# Patient Record
Sex: FEMALE | Race: WHITE | NOT HISPANIC OR LATINO | Employment: PART TIME | URBAN - METROPOLITAN AREA
[De-identification: names, ages, dates, MRNs, and addresses within clinical notes are randomized per-mention and may not be internally consistent; named-entity substitution may affect disease eponyms.]

---

## 2017-11-06 ENCOUNTER — THERAPY VISIT (OUTPATIENT)
Dept: PHYSICAL THERAPY | Facility: CLINIC | Age: 27
End: 2017-11-06
Payer: COMMERCIAL

## 2017-11-06 DIAGNOSIS — M76.32 ILIOTIBIAL BAND SYNDROME, LEFT: ICD-10-CM

## 2017-11-06 DIAGNOSIS — M25.562 ACUTE PAIN OF LEFT KNEE: Primary | ICD-10-CM

## 2017-11-06 PROCEDURE — 97112 NEUROMUSCULAR REEDUCATION: CPT | Mod: GP | Performed by: PHYSICAL THERAPIST

## 2017-11-06 PROCEDURE — 97110 THERAPEUTIC EXERCISES: CPT | Mod: GP | Performed by: PHYSICAL THERAPIST

## 2017-11-06 PROCEDURE — 97161 PT EVAL LOW COMPLEX 20 MIN: CPT | Mod: GP | Performed by: PHYSICAL THERAPIST

## 2017-11-06 NOTE — PROGRESS NOTES
Subjective:    Patient is a 27 year old female presenting with rehab left knee hpi. The history is provided by the patient. No  was used.   Nova Hagan is a 27 year old female with a left knee condition.  Condition occurred with:  A fall/slip, a wrong landing and a twist.  Condition occurred: at home.  This is a new condition  Pt was stepping off a countertop when her knee twisted and she fell onto her L knee. Her patella dislocated and she sufferred a chondral lesion on her lateral femur. She had a microfracture and MPFL reconstruction on 6-24-16. Pt went to PT until Jan of 2017 and began a return to run program in the spring of 2017 and he recommended that she resume a program for strength and running mechanics. PMH: she was in cross country and track and would love to return to running a half marathon or a 10-mile race. She would prefer to run 3-5 miles 3-5x/wk. .    Patient reports pain:  Lateral and medial.  Radiates to:  Thigh (ITB and lateral hip).  Pain is described as stabbing and aching and is intermittent   Associated symptoms:  Loss of strength. Pain is the same all the time.  Symptoms are exacerbated by descending stairs, bending/squatting and running and relieved by ice and rest.  Since onset symptoms are unchanged (better since surgery but when she ran in the spring sx worsened and since then her sx have been unchanged).  Special tests:  MRI and x-ray.  Previous treatment includes physical therapy.  There was moderate improvement following previous treatment.  General health as reported by patient is excellent.    Medical allergies: no.  Other surgeries include:  None reported.  Current medications:  None as reported by the patient.  Current occupation is RN at [employer]  .  Patient is working in normal job without restrictions.  Primary job tasks include:  Prolonged standing and lifting.    Barriers include:  Stairs.    Red flags:  None as reported by the patient.                         Objective:      Gait:      Deviations:  Pelvis:  Incr pelvic rotationHip:  Trendelenberg LKnee:  Transverse plane rotation L and poor patellar tracking LAnkle:  Pronation incr L                                                      Knee Evaluation:  ROM:  AROM: normal  PROM: normal  Strength wnl knee: knee ext/flex were 5/5 but 3+/5 on L hip ABD and 4/5 ext and 4-/5 and 4+/5 on R.            Ligament Testing:  Not Assessed                Special Tests:   Left knee positive for the following special tests:  Nimo's and IT Band Friction    Palpation:  Palpation of knee: medial patellar pain with mildly excessive lateral tracking.      Edema:  Normal      Functional Testing:          Quad:    Single Leg Squat:  Left:         60  Moderate loss of control, hip substitution, femoral IR, excessive anterior knee excursion and decreased hip/trunk flexion  Right:      70  Mild loss of control, femoral IR, excessive anterior knee excursion and decreased hip/trunk flexion  Bilateral Leg Squat:  90  Femoral IR, decreased hip/trunk flexion and normal control              General     ROS    Assessment/Plan:      Patient is a 27 year old female with left side knee complaints.    Patient has the following significant findings with corresponding treatment plan.                Diagnosis 1:  L knee pain with patellofemoral pain following MPFL reconstruction and lateral femoral condylar microfracture    Pain -  hot/cold therapy, splint/taping/bracing/orthotics, self management, education and home program  Decreased strength - therapeutic exercise and therapeutic activities  Decreased proprioception - neuro re-education and therapeutic activities  Impaired muscle performance - neuro re-education  Decreased function - therapeutic activities    Therapy Evaluation Codes:   1) History comprised of:   Personal factors that impact the plan of care:      Time since onset of symptoms.    Comorbidity factors that impact the plan of care  are:      None.     Medications impacting care: None.  2) Examination of Body Systems comprised of:   Body structures and functions that impact the plan of care:      Knee.   Activity limitations that impact the plan of care are:      Jumping, Lifting, Running, Sports and Squatting/kneeling.  3) Clinical presentation characteristics are:   Stable/Uncomplicated.  4) Decision-Making    Low complexity using standardized patient assessment instrument and/or measureable assessment of functional outcome.  Cumulative Therapy Evaluation is: Low complexity.    Previous and current functional limitations:  (See Goal Flow Sheet for this information)    Short term and Long term goals: (See Goal Flow Sheet for this information)     Communication ability:  Patient appears to be able to clearly communicate and understand verbal and written communication and follow directions correctly.  Treatment Explanation - The following has been discussed with the patient:   RX ordered/plan of care  Anticipated outcomes  Possible risks and side effects  This patient would benefit from PT intervention to resume normal activities.   Rehab potential is good.    Frequency:  1 X week, once daily  Duration:  for 6 weeks then 2x/month for 2 months  Discharge Plan:  Achieve all LTG.  Independent in home treatment program.  Reach maximal therapeutic benefit.    Please refer to the daily flowsheet for treatment today, total treatment time and time spent performing 1:1 timed codes.

## 2017-11-06 NOTE — MR AVS SNAPSHOT
After Visit Summary   11/6/2017    Nova Hagan    MRN: [id number]           Patient Information     Date Of Birth          1990        Visit Information        Provider Department      11/6/2017 11:20 AM Leesa Gambino PT [facility]        Today's Diagnoses     Acute pain of left knee    -  1    Iliotibial band syndrome, left           Follow-ups after your visit        Your next 10 appointments already scheduled     Nov 10, 2017  2:00 PM CST   SUZANNE Extremity with Leesa Gambino PT   [facility] ([facility])    [address]  [facility] [address]   [phone]            Nov 17, 2017  8:50 AM CST   SUZANNE Extremity with Leesa Gambino PT   [facility] ([facility])    [address]  [facility] [address]   [phone]            Nov 29, 2017 12:40 PM CST   SUZANNE Extremity with Leesa Gambino PT   [facility] ([facility])    [address]  [facility] [address]   [phone]            Dec 06, 2017 10:00 AM CST   SUZANNE Extremity with Leesa Gambino PT   [facility] ([facility])    [address]  [facility] [address]   [phone]            Dec 13, 2017 12:00 PM CST   SUZANNE Extremity with Leesa Gambino PT   [facility] ([facility])    [address]  [facility] [address]   [phone]              Who to contact     If you have questions or need follow up information about today's clinic visit or your schedule please contact [facility] PHYSICAL THERAPY directly at [phone].  Normal or non-critical lab and imaging results will be communicated to you by  "MyChart, letter or phone within 4 business days after the clinic has received the results. If you do not hear from us within 7 days, please contact the clinic through [employer] or phone. If you have a critical or abnormal lab result, we will notify you by phone as soon as possible.  Submit refill requests through [employer] or call your pharmacy and they will forward the refill request to us. Please allow 3 business days for your refill to be completed.          Additional Information About Your Visit        [employer] Information     [employer] lets you send messages to your doctor, view your test results, renew your prescriptions, schedule appointments and more. To sign up, go to [email or web address] . Click on \"Log in\" on the left side of the screen, which will take you to the Welcome page. Then click on \"Sign up Now\" on the right side of the page.     You will be asked to enter the access code listed below, as well as some personal information. Please follow the directions to create your username and password.     Your access code is: [email or web address]  Expires: 2018  6:45 PM     Your access code will  in 90 days. If you need help or a new code, please call your [place] clinic or [phone].        Care EveryWhere ID     This is your Care EveryWhere ID. This could be used by other organizations to access your [place] medical records  [id number]         Blood Pressure from Last 3 Encounters:   No data found for BP    Weight from Last 3 Encounters:   No data found for Wt              We Performed the Following     HC PT EVAL, LOW COMPLEXITY     SUZANNE INITIAL EVAL REPORT     NEUROMUSCULAR RE-EDUCATION     THERAPEUTIC EXERCISES        Primary Care Provider    Physician No Ref-Primary       NO REF-PRIMARY PHYSICIAN        Equal Access to Services     SHELDON BENOIT : Hadii vasile Foster, sohail simons, qalaureen maki. So [facility] [phone].    ATENCIÓN: " Si habla armani, tiene a brennan disposición servicios gratuitos de asistencia lingüística. Alfredo knight [phone].    We comply with applicable federal civil rights laws and [place] laws. We do not discriminate on the basis of race, color, national origin, age, disability, sex, sexual orientation, or gender identity.            Thank you!     Thank you for choosing [place] [facility] PHYSICAL [facility]  for your care. Our goal is always to provide you with excellent care. Hearing back from our patients is one way we can continue to improve our services. Please take a few minutes to complete the written survey that you may receive in the mail after your visit with us. Thank you!             Your Updated Medication List - Protect others around you: Learn how to safely use, store and throw away your medicines at www.disposemymeds.org.      Notice  As of 11/6/2017 11:59 PM    You have not been prescribed any medications.

## 2017-11-07 PROBLEM — M25.562 ACUTE PAIN OF LEFT KNEE: Status: ACTIVE | Noted: 2017-11-07

## 2017-11-07 PROBLEM — M76.32 ILIOTIBIAL BAND SYNDROME, LEFT: Status: ACTIVE | Noted: 2017-11-07

## 2017-11-08 ASSESSMENT — ACTIVITIES OF DAILY LIVING (ADL)
LIMPING: I DO NOT HAVE THE SYMPTOM
GIVING WAY, BUCKLING OR SHIFTING OF KNEE: THE SYMPTOM AFFECTS MY ACTIVITY SLIGHTLY
HOW_WOULD_YOU_RATE_THE_CURRENT_FUNCTION_OF_YOUR_KNEE_DURING_YOUR_USUAL_DAILY_ACTIVITIES_ON_A_SCALE_FROM_0_TO_100_WITH_100_BEING_YOUR_LEVEL_OF_KNEE_FUNCTION_PRIOR_TO_YOUR_INJURY_AND_0_BEING_THE_INABILITY_TO_PERFORM_ANY_OF_YOUR_USUAL_DAILY_ACTIVITIES?: 99
WALK: ACTIVITY IS NOT DIFFICULT
KNEEL ON THE FRONT OF YOUR KNEE: ACTIVITY IS NOT DIFFICULT
RAW_SCORE: 61
STAND: ACTIVITY IS MINIMALLY DIFFICULT
SIT WITH YOUR KNEE BENT: ACTIVITY IS NOT DIFFICULT
KNEE_ACTIVITY_OF_DAILY_LIVING_SCORE: 87.14
STIFFNESS: THE SYMPTOM AFFECTS MY ACTIVITY SLIGHTLY
KNEE_ACTIVITY_OF_DAILY_LIVING_SUM: 61
RISE FROM A CHAIR: ACTIVITY IS NOT DIFFICULT
GO DOWN STAIRS: ACTIVITY IS NOT DIFFICULT
PAIN: THE SYMPTOM AFFECTS MY ACTIVITY SLIGHTLY
GO UP STAIRS: ACTIVITY IS NOT DIFFICULT
WEAKNESS: THE SYMPTOM AFFECTS MY ACTIVITY SLIGHTLY
SQUAT: ACTIVITY IS NOT DIFFICULT
SWELLING: I DO NOT HAVE THE SYMPTOM

## 2017-11-10 ENCOUNTER — THERAPY VISIT (OUTPATIENT)
Dept: PHYSICAL THERAPY | Facility: CLINIC | Age: 27
End: 2017-11-10
Payer: COMMERCIAL

## 2017-11-10 DIAGNOSIS — M25.562 ACUTE PAIN OF LEFT KNEE: ICD-10-CM

## 2017-11-10 DIAGNOSIS — M76.32 ILIOTIBIAL BAND SYNDROME, LEFT: ICD-10-CM

## 2017-11-10 PROCEDURE — 97140 MANUAL THERAPY 1/> REGIONS: CPT | Mod: GP | Performed by: PHYSICAL THERAPIST

## 2017-11-10 PROCEDURE — 97110 THERAPEUTIC EXERCISES: CPT | Mod: GP | Performed by: PHYSICAL THERAPIST

## 2017-11-17 ENCOUNTER — THERAPY VISIT (OUTPATIENT)
Dept: PHYSICAL THERAPY | Facility: CLINIC | Age: 27
End: 2017-11-17
Payer: COMMERCIAL

## 2017-11-17 DIAGNOSIS — M76.32 ILIOTIBIAL BAND SYNDROME, LEFT: ICD-10-CM

## 2017-11-17 DIAGNOSIS — M25.562 ACUTE PAIN OF LEFT KNEE: ICD-10-CM

## 2017-11-17 PROCEDURE — 97110 THERAPEUTIC EXERCISES: CPT | Mod: GP | Performed by: PHYSICAL THERAPIST

## 2017-11-17 PROCEDURE — 97140 MANUAL THERAPY 1/> REGIONS: CPT | Mod: GP | Performed by: PHYSICAL THERAPIST

## 2017-11-17 NOTE — PROGRESS NOTES
"Subjective:    HPI                    Objective:    System    Physical Exam    General     ROS    Assessment/Plan:      SUBJECTIVE  Subjective changes as noted by pt:  She tried running and did yoga with the tape on and \"it felt good\" to run 3 miles. She also felt that the soft tissue treatment was helpful       Current pain level: 0/10     Changes in function:  Yes (See Goal flowsheet attached for changes in current functional level)     Adverse reaction to treatment or activity:  None    OBJECTIVE  Changes in objective findings:  Yes, ITB tension distally and glute medius is hypertonic on L        ASSESSMENT  Nova continues to require intervention to meet STG and LTG's: PT  Patient's symptoms are resolving.  Patient is progressing as expected.  Response to therapy has shown an improvement in  pain level and flexibility  Progress made towards STG/LTG?  Yes (See Goal flowsheet attached for updates on achievement of STG and LTG)    PLAN  Current treatment program is being advanced to more complex exercises.    PTA/ATC plan:  N/A    Please refer to the daily flowsheet for treatment today, total treatment time and time spent performing 1:1 timed codes.              "

## 2017-11-17 NOTE — MR AVS SNAPSHOT
After Visit Summary   11/17/2017    Nova Hagan    MRN: [id number]           Patient Information     Date Of Birth          1990        Visit Information        Provider Department      11/17/2017 8:50 AM Leesa Gambino, PT [facility]        Today's Diagnoses     Acute pain of left knee        Iliotibial band syndrome, left           Follow-ups after your visit        Your next 10 appointments already scheduled     Nov 29, 2017 12:40 PM CST   SUZANNE Extremity with Leesa Gambino PT   [facility] ([facility])    [address]  [facility] [address]   [phone]            Dec 13, 2017 12:00 PM CST   SUZANNE Extremity with Leesa Gambino PT   [facility] ([facility])    [address]  [facility] [address] [phone]              Who to contact     If you have questions or need follow up information about today's clinic visit or your schedule please contact [facility] directly at [phone].  Normal or non-critical lab and imaging results will be communicated to you by MyChart, letter or phone within 4 business days after the clinic has received the results. If you do not hear from us within 7 days, please contact the clinic through [employer] or phone. If you have a critical or abnormal lab result, we will notify you by phone as soon as possible.  Submit refill requests through [employer] or call your pharmacy and they will forward the refill request to us. Please allow 3 business days for your refill to be completed.          Additional Information About Your Visit        MyChart Information     MYOS lets you send messages to your doctor, view your test results, renew your prescriptions, schedule appointments and more. To sign up, go to  "[email or web address] . Click on \"Log in\" on the left side of the screen, which will take you to the Welcome page. Then click on \"Sign up Now\" on the right side of the page.     You will be asked to enter the access code listed below, as well as some personal information. Please follow the directions to create your username and password.     Your access code is: [email or web address]  Expires: 2018  6:45 PM     Your access code will  in 90 days. If you need help or a new code, please call your [place] clinic or [phone].        Care EveryWhere ID     This is your Care EveryWhere ID. This could be used by other organizations to access your [place] medical records  [id number]         Blood Pressure from Last 3 Encounters:   No data found for BP    Weight from Last 3 Encounters:   No data found for Wt              We Performed the Following     MANUAL THER TECH,1+REGIONS,EA 15 MIN     THERAPEUTIC EXERCISES        Primary Care Provider    Physician No Ref-Primary       NO REF-PRIMARY PHYSICIAN        Equal Access to Services     [facility]: Hadii aad ku hadasho Soomaali, waaxda luqadaha, qaybta kaalmada adeirmayagrayson, laureen tuttle . So [facility] [phone].    ATENCIÓN: Si habla español, tiene a brennan disposición servicios gratuitos de asistencia lingüística. Llame al [phone].    We comply with applicable federal civil rights laws and [place] laws. We do not discriminate on the basis of race, color, national origin, age, disability, sex, sexual orientation, or gender identity.            Thank you!     Thank you for choosing [facility]  for your care. Our goal is always to provide you with excellent care. Hearing back from our patients is one way we can continue to improve our services. Please take a few minutes to complete the written survey that you may receive in the mail after your visit with us. Thank you!             Your " Updated Medication List - Protect others around you: Learn how to safely use, store and throw away your medicines at www.disposemymeds.org.      Notice  As of 11/17/2017 12:04 PM    You have not been prescribed any medications.

## 2019-06-25 ENCOUNTER — TRANSFERRED RECORDS (OUTPATIENT)
Dept: HEALTH INFORMATION MANAGEMENT | Facility: CLINIC | Age: 29
End: 2019-06-25

## 2020-06-03 ENCOUNTER — OFFICE VISIT (OUTPATIENT)
Dept: OBGYN | Facility: CLINIC | Age: 30
End: 2020-06-03
Payer: COMMERCIAL

## 2020-06-03 ENCOUNTER — TRANSFERRED RECORDS (OUTPATIENT)
Dept: HEALTH INFORMATION MANAGEMENT | Facility: CLINIC | Age: 30
End: 2020-06-03

## 2020-06-03 VITALS
HEART RATE: 61 BPM | DIASTOLIC BLOOD PRESSURE: 62 MMHG | SYSTOLIC BLOOD PRESSURE: 111 MMHG | WEIGHT: 136.38 LBS | TEMPERATURE: 97.4 F

## 2020-06-03 DIAGNOSIS — Z01.818 PREOPERATIVE EXAMINATION: Primary | ICD-10-CM

## 2020-06-03 DIAGNOSIS — Z78.9: ICD-10-CM

## 2020-06-03 PROCEDURE — 99203 OFFICE O/P NEW LOW 30 MIN: CPT | Performed by: OBSTETRICS & GYNECOLOGY

## 2020-06-03 RX ORDER — PRENATAL VIT/IRON FUM/FOLIC AC 27MG-0.8MG
1 TABLET ORAL DAILY
COMMUNITY
End: 2023-12-13

## 2020-06-03 NOTE — PATIENT INSTRUCTIONS
If you have any questions regarding your visit, Please contact your care team.     [facility]: [phone]  [facility] CLINIC HOURS TELEPHONE NUMBER       Bayron Vasquez M.D.      Damon Caputo-Medical Assistant    [place]-  [facility]ith-     Monday-[place]  8:00a.m-4:45 p.m  Tuesday-[place]  9:00a.m-4:00 p.m  Wednesday-[place] 8:00a.m-4:45 p.m.  Thursday-[place]  8:00a.m-4:45 p.m.  Friday-[place]  8:00a.m-4:45 p.m. [facility]  [address]  [place], [place] [address] [phone] ask [facility]  [phone] Fax  Imaging [phone]    [facility]  [address]  [place], [place] [address] [phone]    [facility]  [address] [place] [address] [phone] [facility]  [phone] Fax  Imaging [phone]     Urgent Care locations:    [facility] Monday-Friday  5 pm - 9 pm  Saturday and Sunday   9 am - 5 pm  Monday-Friday   11 am - 9 pm  Saturday and Sunday   9 am - 5 pm   [phone]   If you need a medication refill, please contact your pharmacy. Please allow 3 business days for your refill to be completed.  As always, Thank you for trusting us with your healthcare needs!

## 2020-06-04 PROBLEM — M76.32 ILIOTIBIAL BAND SYNDROME, LEFT: Status: RESOLVED | Noted: 2017-11-07 | Resolved: 2020-06-04

## 2020-06-04 PROBLEM — M25.562 ACUTE PAIN OF LEFT KNEE: Status: RESOLVED | Noted: 2017-11-07 | Resolved: 2020-06-04

## 2020-06-05 NOTE — PROGRESS NOTES
OB-GYN Problem-Oriented Visit or Consultation      Nova Matthews is a 29 year old year old P 0 who presents with a chief complaint of preop exam prior to egg retrieval for IVF.  Referred by Dr. Keshav Jimenez at [facility].  Patient's last menstrual period was 05/19/2020 (approximate).    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 He has low sperm count but enough for IVF. Menses q 28-30 days x 4-5 days but currently controlled with gonadotropin treatment.     Past medical, obstetrical, surgical, family and social history reviewed and as noted or updated in chart.     Allergies, meds and supplements are as noted or updated in chart.      ROS:   Systems reviewed include:  constitutional, cardiac, respiratory, genitourinary, musculoskeletal, integumentary, psychological, hematologic/lymphatic and endocrine.    These systems were negative for significant symptoms except for the following additional: none; see HPI.    The patient denies allergies, anesthesia problems, bleeding tendencies, corticosteroids, diabetes, thromboembolic phenonmenon, rheumatic fever, glaucoma, heart murmur, hepatitis, herbal supplements, recent illnesses, implanted devices, body jewelry, Latex sensitivity, transfusions, and tobacco use except for the following: none.    EXAM:  VS as noted. /62 (BP Location: Right arm, Patient Position: Sitting, Cuff Size: Adult Regular)   Pulse 61   Temp 97.4  F (36.3  C) (Oral)   Wt 61.9 kg (136 lb 6 oz)   LMP 05/19/2020 (Approximate)   Constitutionally normal.  Airway, Neck, Nodes, Heart, Lungs were  normal or negative except for, or in particular noting, the following  pertinent findings: class 1 airway.      Assessment:   Encounter Diagnoses   Name Primary?     Preoperative examination Yes     History of therapy for infertility        Plan and Recommendations:   Total encounter time (physician together with patient) = 30min.  Direct counseling, education and care coordination time (physician together with patient) = 20min. This included the following:   I reviewed the condition, causes, differential diagnosis, prognosis, evaluation and management considerations and options.  Questions answered and information given. See orders.     Satisfactory pre-anesthetic medical exam. Form completed and faxed. No other preop testing needed.    A/P:  Nova was seen today for pre-op exam.    Diagnoses and all orders for this visit:    Preoperative examination    History of therapy for infertility        Bayron Vasquez MD

## 2020-09-28 ENCOUNTER — OFFICE VISIT (OUTPATIENT)
Dept: OBGYN | Facility: CLINIC | Age: 30
End: 2020-09-28
Attending: OBSTETRICS & GYNECOLOGY
Payer: COMMERCIAL

## 2020-09-28 ENCOUNTER — PRE VISIT (OUTPATIENT)
Dept: MATERNAL FETAL MEDICINE | Facility: CLINIC | Age: 30
End: 2020-09-28

## 2020-09-28 ENCOUNTER — ANCILLARY PROCEDURE (OUTPATIENT)
Dept: ULTRASOUND IMAGING | Facility: CLINIC | Age: 30
End: 2020-09-28
Attending: OBSTETRICS & GYNECOLOGY
Payer: COMMERCIAL

## 2020-09-28 ENCOUNTER — TRANSCRIBE ORDERS (OUTPATIENT)
Dept: MATERNAL FETAL MEDICINE | Facility: CLINIC | Age: 30
End: 2020-09-28

## 2020-09-28 VITALS
BODY MASS INDEX: 21.58 KG/M2 | HEART RATE: 67 BPM | HEIGHT: 68 IN | SYSTOLIC BLOOD PRESSURE: 111 MMHG | DIASTOLIC BLOOD PRESSURE: 75 MMHG | WEIGHT: 142.4 LBS

## 2020-09-28 DIAGNOSIS — Z78.9 CONCEIVED BY IN VITRO FERTILIZATION: ICD-10-CM

## 2020-09-28 DIAGNOSIS — O09.90 HIGH-RISK PREGNANCY, UNSPECIFIED TRIMESTER: Primary | ICD-10-CM

## 2020-09-28 DIAGNOSIS — O26.90 PREGNANCY RELATED CONDITION, ANTEPARTUM: Primary | ICD-10-CM

## 2020-09-28 DIAGNOSIS — O43.199 MARGINAL INSERTION OF UMBILICAL CORD AFFECTING MANAGEMENT OF MOTHER: ICD-10-CM

## 2020-09-28 DIAGNOSIS — Z23 NEED FOR PROPHYLACTIC VACCINATION AND INOCULATION AGAINST INFLUENZA: ICD-10-CM

## 2020-09-28 DIAGNOSIS — Z34.91 ENCOUNTER FOR SUPERVISION OF NORMAL PREGNANCY IN FIRST TRIMESTER, UNSPECIFIED GRAVIDITY: ICD-10-CM

## 2020-09-28 LAB
ABO + RH BLD: NORMAL
ABO + RH BLD: NORMAL
BLD GP AB SCN SERPL QL: NORMAL
BLOOD BANK CMNT PATIENT-IMP: NORMAL
DEPRECATED CALCIDIOL+CALCIFEROL SERPL-MC: 77 UG/L (ref 20–75)
ERYTHROCYTE [DISTWIDTH] IN BLOOD BY AUTOMATED COUNT: 12.3 % (ref 10–15)
HBV SURFACE AB SERPL IA-ACNC: 47.06 M[IU]/ML
HBV SURFACE AG SERPL QL IA: NONREACTIVE
HCT VFR BLD AUTO: 39.6 % (ref 35–47)
HCV AB SERPL QL IA: NONREACTIVE
HGB BLD-MCNC: 13.2 G/DL (ref 11.7–15.7)
HIV 1+2 AB+HIV1 P24 AG SERPL QL IA: NONREACTIVE
MCH RBC QN AUTO: 30.6 PG (ref 26.5–33)
MCHC RBC AUTO-ENTMCNC: 33.3 G/DL (ref 31.5–36.5)
MCV RBC AUTO: 92 FL (ref 78–100)
PLATELET # BLD AUTO: 285 10E9/L (ref 150–450)
RBC # BLD AUTO: 4.32 10E12/L (ref 3.8–5.2)
RUBV IGG SERPL IA-ACNC: 14 IU/ML
SPECIMEN EXP DATE BLD: NORMAL
T PALLIDUM AB SER QL: NONREACTIVE
WBC # BLD AUTO: 10.2 10E9/L (ref 4–11)

## 2020-09-28 PROCEDURE — 86850 RBC ANTIBODY SCREEN: CPT | Performed by: ADVANCED PRACTICE MIDWIFE

## 2020-09-28 PROCEDURE — 87086 URINE CULTURE/COLONY COUNT: CPT | Performed by: ADVANCED PRACTICE MIDWIFE

## 2020-09-28 PROCEDURE — 90686 IIV4 VACC NO PRSV 0.5 ML IM: CPT | Mod: ZF

## 2020-09-28 PROCEDURE — G0008 ADMIN INFLUENZA VIRUS VAC: HCPCS | Mod: ZF

## 2020-09-28 PROCEDURE — 86780 TREPONEMA PALLIDUM: CPT | Performed by: ADVANCED PRACTICE MIDWIFE

## 2020-09-28 PROCEDURE — 76801 OB US < 14 WKS SINGLE FETUS: CPT

## 2020-09-28 PROCEDURE — 87389 HIV-1 AG W/HIV-1&-2 AB AG IA: CPT | Performed by: ADVANCED PRACTICE MIDWIFE

## 2020-09-28 PROCEDURE — G0463 HOSPITAL OUTPT CLINIC VISIT: HCPCS | Mod: ZF,25

## 2020-09-28 PROCEDURE — 86762 RUBELLA ANTIBODY: CPT | Performed by: ADVANCED PRACTICE MIDWIFE

## 2020-09-28 PROCEDURE — 85027 COMPLETE CBC AUTOMATED: CPT | Performed by: ADVANCED PRACTICE MIDWIFE

## 2020-09-28 PROCEDURE — 36415 COLL VENOUS BLD VENIPUNCTURE: CPT | Performed by: ADVANCED PRACTICE MIDWIFE

## 2020-09-28 PROCEDURE — 86803 HEPATITIS C AB TEST: CPT | Performed by: ADVANCED PRACTICE MIDWIFE

## 2020-09-28 PROCEDURE — 86900 BLOOD TYPING SEROLOGIC ABO: CPT | Performed by: ADVANCED PRACTICE MIDWIFE

## 2020-09-28 PROCEDURE — 86706 HEP B SURFACE ANTIBODY: CPT | Performed by: ADVANCED PRACTICE MIDWIFE

## 2020-09-28 PROCEDURE — 82306 VITAMIN D 25 HYDROXY: CPT | Performed by: ADVANCED PRACTICE MIDWIFE

## 2020-09-28 PROCEDURE — 25000128 H RX IP 250 OP 636: Mod: ZF

## 2020-09-28 PROCEDURE — 87340 HEPATITIS B SURFACE AG IA: CPT | Performed by: ADVANCED PRACTICE MIDWIFE

## 2020-09-28 PROCEDURE — 86901 BLOOD TYPING SEROLOGIC RH(D): CPT | Performed by: ADVANCED PRACTICE MIDWIFE

## 2020-09-28 RX ORDER — SENNOSIDES 8.6 MG
1 TABLET ORAL DAILY
Status: ON HOLD | COMMUNITY
End: 2021-03-31

## 2020-09-28 RX ORDER — ASPIRIN 81 MG/1
81 TABLET, CHEWABLE ORAL DAILY
Status: ON HOLD | COMMUNITY
End: 2021-03-31

## 2020-09-28 SDOH — ECONOMIC STABILITY: FOOD INSECURITY: WITHIN THE PAST 12 MONTHS, YOU WORRIED THAT YOUR FOOD WOULD RUN OUT BEFORE YOU GOT THE MONEY TO BUY MORE.: NEVER TRUE

## 2020-09-28 SDOH — SOCIAL STABILITY: SOCIAL INSECURITY
WITHIN THE LAST YEAR, HAVE YOU BEEN RAPED OR FORCED TO HAVE ANY KIND OF SEXUAL ACTIVITY BY YOUR PARTNER OR EX-PARTNER?: NO

## 2020-09-28 SDOH — SOCIAL STABILITY: SOCIAL INSECURITY
WITHIN THE LAST YEAR, HAVE YOU BEEN KICKED, HIT, SLAPPED, OR OTHERWISE PHYSICALLY HURT BY YOUR PARTNER OR EX-PARTNER?: NO

## 2020-09-28 SDOH — HEALTH STABILITY: PHYSICAL HEALTH: ON AVERAGE, HOW MANY DAYS PER WEEK DO YOU ENGAGE IN MODERATE TO STRENUOUS EXERCISE (LIKE A BRISK WALK)?: 3 DAYS

## 2020-09-28 SDOH — SOCIAL STABILITY: SOCIAL NETWORK: HOW OFTEN DO YOU ATTEND MEETINGS OF THE CLUBS OR ORGANIZATIONS YOU BELONG TO?: NEVER

## 2020-09-28 SDOH — SOCIAL STABILITY: SOCIAL INSECURITY: WITHIN THE LAST YEAR, HAVE YOU BEEN AFRAID OF YOUR PARTNER OR EX-PARTNER?: NO

## 2020-09-28 SDOH — SOCIAL STABILITY: SOCIAL NETWORK: HOW OFTEN DO YOU GET TOGETHER WITH FRIENDS OR RELATIVES?: TWICE A WEEK

## 2020-09-28 SDOH — SOCIAL STABILITY: SOCIAL NETWORK
IN A TYPICAL WEEK, HOW MANY TIMES DO YOU TALK ON THE PHONE WITH FAMILY, FRIENDS, OR NEIGHBORS?: MORE THAN THREE TIMES A WEEK

## 2020-09-28 SDOH — ECONOMIC STABILITY: TRANSPORTATION INSECURITY: IN THE PAST 12 MONTHS, HAS LACK OF TRANSPORTATION KEPT YOU FROM MEDICAL APPOINTMENTS OR FROM GETTING MEDICATIONS?: NO

## 2020-09-28 SDOH — ECONOMIC STABILITY: FOOD INSECURITY: HOW HARD IS IT FOR YOU TO PAY FOR THE VERY BASICS LIKE FOOD, HOUSING, MEDICAL CARE, AND HEATING?: NOT HARD AT ALL

## 2020-09-28 SDOH — HEALTH STABILITY: PHYSICAL HEALTH: ON AVERAGE, HOW MANY MINUTES DO YOU ENGAGE IN EXERCISE AT THIS LEVEL?: 30 MIN

## 2020-09-28 SDOH — SOCIAL STABILITY: SOCIAL INSECURITY: WITHIN THE LAST YEAR, HAVE YOU BEEN HUMILIATED OR EMOTIONALLY ABUSED IN OTHER WAYS BY YOUR PARTNER OR EX-PARTNER?: NO

## 2020-09-28 SDOH — SOCIAL STABILITY: SOCIAL NETWORK: HOW OFTEN DO YOU ATTEND CHURCH OR RELIGIOUS SERVICES?: MORE THAN 4 TIMES PER YEAR

## 2020-09-28 SDOH — ECONOMIC STABILITY: FOOD INSECURITY: WITHIN THE PAST 12 MONTHS, THE FOOD YOU BOUGHT JUST DIDN'T LAST AND YOU DIDN'T HAVE MONEY TO GET MORE.: NEVER TRUE

## 2020-09-28 SDOH — HEALTH STABILITY: MENTAL HEALTH
DO YOU FEEL STRESS - TENSE, RESTLESS, NERVOUS, OR ANXIOUS, OR UNABLE TO SLEEP AT NIGHT BECAUSE YOUR MIND IS TROUBLED ALL THE TIME - THESE DAYS?: ONLY A LITTLE

## 2020-09-28 SDOH — SOCIAL STABILITY: SOCIAL NETWORK
DO YOU BELONG TO ANY CLUBS OR ORGANIZATIONS SUCH AS CHURCH GROUPS, UNIONS, FRATERNAL OR ATHLETIC GROUPS, OR SCHOOL GROUPS?: NO

## 2020-09-28 SDOH — SOCIAL STABILITY: SOCIAL INSECURITY: ARE YOU MARRIED, WIDOWED, DIVORCED, SEPARATED, NEVER MARRIED, OR LIVING WITH A PARTNER?: MARRIED

## 2020-09-28 ASSESSMENT — PAIN SCALES - GENERAL: PAINLEVEL: NO PAIN (0)

## 2020-09-28 ASSESSMENT — ACTIVITIES OF DAILY LIVING (ADL): LACK_OF_TRANSPORTATION: NO

## 2020-09-28 ASSESSMENT — MIFFLIN-ST. JEOR: SCORE: 1409.42

## 2020-09-28 NOTE — LETTER
2020       RE: Nova Matthews  [address]  [facility] [address]     Dear Colleague,    Thank you for referring your patient, Nova Matthews, to the [facility] at [facility]. Please see a copy of my visit note below.    [facility] OB Intake note  Subjective   30 year old woman presents to clinic for initiation of OB care. No LMP recorded. Patient is pregnant.  at Unknown by Estimated Date of Delivery: 2021 based on US confirms. Reviewed dating ultrasound. FHT, 164 vpm.  Pregnancy is planned, and conceived  through IVF.      Symptoms since LMP include nausea, vomiting, bowel changes, breast tenderness and fatigue. Patient has tried these relief measures: diet modification, vitamin B-6, increased rest and increased fluids. Senna 2 tabs for constipation.     - Genetic/Infection questionnaire completed, risks include . Pt  does not have a recent known exposure to Parvo or CMV so IgG/IgM testing WILL NOT be ordered.   Recommended Flu Vaccine.  Flu Vaccine Given  Have you traveled during the pregnancy?No  Have your sexual partner(s) travelled during the pregnancy?No      - Current Medications    Current Outpatient Medications   Medication Sig Dispense Refill     aspirin (ASA) 81 MG chewable tablet Take 81 mg by mouth daily       Cholecalciferol (VITAMIN D-3) 125 MCG (5000 UT) TABS        FOLIC ACID PO Take 1 mg by mouth daily       Follitropin Beta (FOLLISTIM IJ) Inject 150 Units as directed       Prenatal Vit-Fe Fumarate-FA (PRENATAL MULTIVITAMIN W/IRON) 27-0.8 MG tablet Take 1 tablet by mouth daily       sennosides (SENOKOT) 8.6 MG tablet Take 1 tablet by mouth daily           - Co-morbids    Past Medical History:   Diagnosis Date     NO ACTIVE PROBLEMS      - Risk for GDM -  has No known risk factors for GDM WILL NOT have an early GCT and possible Hgb A1C    - High Risk for Pre E-  Has No known risk factors of High risk for Pre E so WILL NOT start low  dose aspirin (81mg) starting between 12 and 28 weeks to prevent early onset preeclampsia.    - Moderate risk - has moderate risk factors for Pre E including Nulliparity    Since she meets one of the moderate risk facrtors for Pre E -   so WILL NOT consider starting low dose aspirin (81mg) starting between 12 and 28 weeks to prevent early onset preeclampsia.*[facility] recommendation for IVF and nullip, will recommend daily LD ASA- needs discussion at NOB    - The patient  does not have a history of spontaneous  birth so  WILL NOT consider progesterone starting at 16-20 weeks and/or serial transvaginal cervical length ultrasounds from 16-24 weeks.     -The patient does not have a history of immunosuppresion or HIV so Toxoplasma IgG/IgM WILL NOT be ordered.         PERSONAL/SOCIAL HISTORY    lives with their spouse.  Employment: Part time. Her job involves moderate activity. Will start working night shifts.   History of anxiety or depression  No- if Yes, therapy/medication/hospitalization  Additional items: None    Objective  -VS: reviewed and within normal limits   -General appearance: no acute distress, patient is comfortable   NEUROLOGICAL/PSYCHIATRIC   - Orientated x3,   -Mood and affect: : normal     Assessment/Plan  Nova was seen today for prenatal care.    Diagnoses and all orders for this visit:    High-risk pregnancy, unspecified trimester  -     ABO/Rh type and screen  -     Hepatitis B surface antigen  -     CBC with platelets  -     HIV Antigen Antibody Combo  -     Rubella Antibody IgG Quantitative  -     Treponema Abs w Reflex to RPR and Titer  -     Urine Culture Aerobic Bacterial  -     Hepatitis C antibody  -     Vitamin D Deficiency  -     Hepatitis B Surface Antibody  -     [facility] REFERRAL-PREGNANCY    Conceived by in vitro fertilization    Need for prophylactic vaccination and inoculation against influenza  -     FLU VAC PRESRV FREE QUAD SPLIT VIR 3+YRS IM    Marginal insertion of  umbilical cord affecting management of mother        30 year old  Unknown weeks of pregnancy with МАРИНА of 2021 by LMP of No LMP recorded. Patient is pregnant.. Ultrasound confirms.   Outpatient Encounter Medications as of 2020   Medication Sig Dispense Refill     aspirin (ASA) 81 MG chewable tablet Take 81 mg by mouth daily       Cholecalciferol (VITAMIN D-3) 125 MCG (5000 UT) TABS        FOLIC ACID PO Take 1 mg by mouth daily       Follitropin Beta (FOLLISTIM IJ) Inject 150 Units as directed       Prenatal Vit-Fe Fumarate-FA (PRENATAL MULTIVITAMIN W/IRON) 27-0.8 MG tablet Take 1 tablet by mouth daily       sennosides (SENOKOT) 8.6 MG tablet Take 1 tablet by mouth daily       No facility-administered encounter medications on file as of 2020.       Orders Placed This Encounter   Procedures     FLU VAC PRESRV FREE QUAD SPLIT VIR 3+YRS IM     Hepatitis B surface antigen     CBC with platelets     HIV Antigen Antibody Combo     Rubella Antibody IgG Quantitative     Treponema Abs w Reflex to RPR and Titer     Hepatitis C antibody     Vitamin D Deficiency     Hepatitis B Surface Antibody     MAT FETAL MED CTR REFERRAL-PREGNANCY     ABO/Rh type and screen                 Orders Placed This Encounter   Procedures     FLU VAC PRESRV FREE QUAD SPLIT VIR 3+YRS IM     Hepatitis B surface antigen     CBC with platelets     HIV Antigen Antibody Combo     Rubella Antibody IgG Quantitative     Treponema Abs w Reflex to RPR and Titer     Hepatitis C antibody     Vitamin D Deficiency     Hepatitis B Surface Antibody     MAT FETAL MED CTR REFERRAL-PREGNANCY     ABO/Rh type and screen       - Nausea, ordered vitamin B6 and Unisom 25 mg.   - 1st trimester screening ordered.    - Oriented to Practice, types of care, and how to reach a provider.  Pt prefers CNM.  - Patient received 1st trimester new OB education packet complete with aide of [employer] Expect[employer] booklet including information on genetic screening test  options.  - Patient desires 1st trimester screening which was ordered.  - Educational handout on the prevention of infections diseases during pregnancy provided.  - Patient was encouraged to start prenatal vitamins as tolerated.    - Patient was sent to lab for routine OB labs including Hep B, CBC, HIV, Rubella, Treponema, Hep C, Vit D, ABO/Rh.    - Reviewed risk for diabetes in pregnancy, No risk for GDM.  - Reviewed recommendation for low dose aspirin daily to prevent pre eclampsia, pt agrees, follow up at NOB visit. Was recommended by fertility to start on aspirin.   - Pregnancy concerns to be addressed by provider at new OB exam include: Was pt able to [id number] trimester screening and improvement of nausea/vomiting.  -watch for US assessment on next US    Pt to RTO for NOB visit in 2 weeks and prn if questions or concerns    I, Ramin Ramírez, am serving as a scribe; to document services personally performed by  Deirdre Alas based on data collection and the provider's statements to me.     Ramin Ramírez, SACHINN, JOSHUA Student  I agree with the PFSH and ROS as completed by the student, except for changes made by me. The remainder of the encounter was performed by me and scribed by the student. The scribed note accurately reflects my personal services and decisions made by me.  Deirdre Alas DNP, SCOTT, JAI Morrison CNM          Again, thank you for allowing me to participate in the care of your patient.      Sincerely,    JAI Cole CNM

## 2020-09-28 NOTE — LETTER
Date:October 3, 2020      Provider requested that no letter be sent. Do not send.       [facility] Health Information

## 2020-09-28 NOTE — PROGRESS NOTES
[facility] OB Intake note  Subjective   30 year old woman presents to clinic for initiation of OB care. No LMP recorded. Patient is pregnant.  at Unknown by Estimated Date of Delivery: 2021 based on US confirms. Reviewed dating ultrasound. FHT, 164 vpm.  Pregnancy is planned, and conceived  through IVF.      Symptoms since LMP include nausea, vomiting, bowel changes, breast tenderness and fatigue. Patient has tried these relief measures: diet modification, vitamin B-6, increased rest and increased fluids. Senna 2 tabs for constipation.     - Genetic/Infection questionnaire completed, risks include . Pt  does not have a recent known exposure to Parvo or CMV so IgG/IgM testing WILL NOT be ordered.   Recommended Flu Vaccine.  Flu Vaccine Given  Have you traveled during the pregnancy?No  Have your sexual partner(s) travelled during the pregnancy?No      - Current Medications    Current Outpatient Medications   Medication Sig Dispense Refill     aspirin (ASA) 81 MG chewable tablet Take 81 mg by mouth daily       Cholecalciferol (VITAMIN D-3) 125 MCG (5000 UT) TABS        FOLIC ACID PO Take 1 mg by mouth daily       Follitropin Beta (FOLLISTIM IJ) Inject 150 Units as directed       Prenatal Vit-Fe Fumarate-FA (PRENATAL MULTIVITAMIN W/IRON) 27-0.8 MG tablet Take 1 tablet by mouth daily       sennosides (SENOKOT) 8.6 MG tablet Take 1 tablet by mouth daily           - Co-morbids    Past Medical History:   Diagnosis Date     NO ACTIVE PROBLEMS      - Risk for GDM -  has No known risk factors for GDM WILL NOT have an early GCT and possible Hgb A1C    - High Risk for Pre E-  Has No known risk factors of High risk for Pre E so WILL NOT start low dose aspirin (81mg) starting between 12 and 28 weeks to prevent early onset preeclampsia.    - Moderate risk - has moderate risk factors for Pre E including Nulliparity    Since she meets one of the moderate risk facrtors for Pre E -   so WILL NOT consider starting low dose  aspirin (81mg) starting between 12 and 28 weeks to prevent early onset preeclampsia.*M recommendation for IVF and nullip, will recommend daily LD ASA- needs discussion at NOB    - The patient  does not have a history of spontaneous  birth so  WILL NOT consider progesterone starting at 16-20 weeks and/or serial transvaginal cervical length ultrasounds from 16-24 weeks.     -The patient does not have a history of immunosuppresion or HIV so Toxoplasma IgG/IgM WILL NOT be ordered.         PERSONAL/SOCIAL HISTORY    lives with their spouse.  Employment: Part time. Her job involves moderate activity. Will start working night shifts.   History of anxiety or depression  No- if Yes, therapy/medication/hospitalization  Additional items: None    Objective  -VS: reviewed and within normal limits   -General appearance: no acute distress, patient is comfortable   NEUROLOGICAL/PSYCHIATRIC   - Orientated x3,   -Mood and affect: : normal     Assessment/Plan  Nova was seen today for prenatal care.    Diagnoses and all orders for this visit:    High-risk pregnancy, unspecified trimester  -     ABO/Rh type and screen  -     Hepatitis B surface antigen  -     CBC with platelets  -     HIV Antigen Antibody Combo  -     Rubella Antibody IgG Quantitative  -     Treponema Abs w Reflex to RPR and Titer  -     Urine Culture Aerobic Bacterial  -     Hepatitis C antibody  -     Vitamin D Deficiency  -     Hepatitis B Surface Antibody  -     [facility] CTR REFERRAL-PREGNANCY    Conceived by in vitro fertilization    Need for prophylactic vaccination and inoculation against influenza  -     FLU VAC PRESRV FREE QUAD SPLIT VIR 3+YRS IM    Marginal insertion of umbilical cord affecting management of mother        30 year old  Unknown weeks of pregnancy with МАРИНА of 2021 by LMP of No LMP recorded. Patient is pregnant.. Ultrasound confirms.   Outpatient Encounter Medications as of 2020   Medication Sig Dispense  Refill     aspirin (ASA) 81 MG chewable tablet Take 81 mg by mouth daily       Cholecalciferol (VITAMIN D-3) 125 MCG (5000 UT) TABS        FOLIC ACID PO Take 1 mg by mouth daily       Follitropin Beta (FOLLISTIM IJ) Inject 150 Units as directed       Prenatal Vit-Fe Fumarate-FA (PRENATAL MULTIVITAMIN W/IRON) 27-0.8 MG tablet Take 1 tablet by mouth daily       sennosides (SENOKOT) 8.6 MG tablet Take 1 tablet by mouth daily       No facility-administered encounter medications on file as of 9/28/2020.       Orders Placed This Encounter   Procedures     FLU VAC PRESRV FREE QUAD SPLIT VIR 3+YRS IM     Hepatitis B surface antigen     CBC with platelets     HIV Antigen Antibody Combo     Rubella Antibody IgG Quantitative     Treponema Abs w Reflex to RPR and Titer     Hepatitis C antibody     Vitamin D Deficiency     Hepatitis B Surface Antibody     MAT FETAL MED CTR REFERRAL-PREGNANCY     ABO/Rh type and screen                 Orders Placed This Encounter   Procedures     FLU VAC PRESRV FREE QUAD SPLIT VIR 3+YRS IM     Hepatitis B surface antigen     CBC with platelets     HIV Antigen Antibody Combo     Rubella Antibody IgG Quantitative     Treponema Abs w Reflex to RPR and Titer     Hepatitis C antibody     Vitamin D Deficiency     Hepatitis B Surface Antibody     MAT FETAL MED CTR REFERRAL-PREGNANCY     ABO/Rh type and screen       - Nausea, ordered vitamin B6 and Unisom 25 mg.   - 1st trimester screening ordered.    - Oriented to Practice, types of care, and how to reach a provider.  Pt prefers CNM.  - Patient received 1st trimester new OB education packet complete with aide of [employer] Expectant Family booklet including information on genetic screening test options.  - Patient desires 1st trimester screening which was ordered.  - Educational handout on the prevention of infections diseases during pregnancy provided.  - Patient was encouraged to start prenatal vitamins as tolerated.    - Patient was sent to lab for routine  OB labs including Hep B, CBC, HIV, Rubella, Treponema, Hep C, Vit D, ABO/Rh.    - Reviewed risk for diabetes in pregnancy, No risk for GDM.  - Reviewed recommendation for low dose aspirin daily to prevent pre eclampsia, pt agrees, follow up at NOB visit. Was recommended by fertility to start on aspirin.   - Pregnancy concerns to be addressed by provider at new OB exam include: Was pt able to [id number] trimester screening and improvement of nausea/vomiting.  -watch for US assessment on next US    Pt to RTO for NOB visit in 2 weeks and prn if questions or concerns    I, Ramin Ramírez, am serving as a scribe; to document services personally performed by  Deirdre Alas based on data collection and the provider's statements to me.     Ramin Ramírez, SACHINN, DNP Student  I agree with the PFSH and ROS as completed by the student, except for changes made by me. The remainder of the encounter was performed by me and scribed by the student. The scribed note accurately reflects my personal services and decisions made by me.  Deirdre Alas DNP, CNM, APRN      Deirdre Alas, JAI CHAVEZ

## 2020-09-29 LAB
BACTERIA SPEC CULT: NO GROWTH
Lab: NORMAL
SPECIMEN SOURCE: NORMAL

## 2020-10-05 ENCOUNTER — HOSPITAL ENCOUNTER (OUTPATIENT)
Dept: ULTRASOUND IMAGING | Facility: CLINIC | Age: 30
End: 2020-10-05
Attending: ADVANCED PRACTICE MIDWIFE
Payer: COMMERCIAL

## 2020-10-05 ENCOUNTER — OFFICE VISIT (OUTPATIENT)
Dept: MATERNAL FETAL MEDICINE | Facility: CLINIC | Age: 30
End: 2020-10-05
Attending: ADVANCED PRACTICE MIDWIFE
Payer: COMMERCIAL

## 2020-10-05 ENCOUNTER — HOSPITAL ENCOUNTER (OUTPATIENT)
Dept: LAB | Facility: CLINIC | Age: 30
End: 2020-10-05
Attending: ADVANCED PRACTICE MIDWIFE
Payer: COMMERCIAL

## 2020-10-05 DIAGNOSIS — O26.90 PREGNANCY RELATED CONDITION, ANTEPARTUM: ICD-10-CM

## 2020-10-05 DIAGNOSIS — Z36.9 ENCOUNTER FOR ANTENATAL SCREENING: Primary | ICD-10-CM

## 2020-10-05 DIAGNOSIS — O09.811 PREGNANCY RESULTING FROM IN VITRO FERTILIZATION IN FIRST TRIMESTER: ICD-10-CM

## 2020-10-05 DIAGNOSIS — O09.811 PREGNANCY RESULTING FROM IN VITRO FERTILIZATION IN FIRST TRIMESTER: Primary | ICD-10-CM

## 2020-10-05 DIAGNOSIS — Z36.9 ENCOUNTER FOR ANTENATAL SCREENING: ICD-10-CM

## 2020-10-05 PROCEDURE — 99207 PR NO CHARGE LOS: CPT | Performed by: OBSTETRICS & GYNECOLOGY

## 2020-10-05 PROCEDURE — 76813 OB US NUCHAL MEAS 1 GEST: CPT

## 2020-10-05 PROCEDURE — 84704 HCG FREE BETACHAIN TEST: CPT | Performed by: OBSTETRICS & GYNECOLOGY

## 2020-10-05 PROCEDURE — 36415 COLL VENOUS BLD VENIPUNCTURE: CPT | Performed by: OBSTETRICS & GYNECOLOGY

## 2020-10-05 PROCEDURE — 96040 HC GENETIC COUNSELING, EACH 30 MINUTES: CPT | Performed by: GENETIC COUNSELOR, MS

## 2020-10-05 PROCEDURE — 84163 PAPPA SERUM: CPT | Performed by: OBSTETRICS & GYNECOLOGY

## 2020-10-05 PROCEDURE — 82105 ALPHA-FETOPROTEIN SERUM: CPT | Performed by: OBSTETRICS & GYNECOLOGY

## 2020-10-05 PROCEDURE — 76813 OB US NUCHAL MEAS 1 GEST: CPT | Mod: 26 | Performed by: OBSTETRICS & GYNECOLOGY

## 2020-10-05 NOTE — PROGRESS NOTES
[facility]  Genetic Counseling Consult    Patient: Nova Matthews YOB: 1990   Date of Service: 10/05/20      Nova Matthews was seen at [facility] for genetic consultation to discuss the options for routine screening for fetal chromosome abnormalities. The patient was accompanied by her partner, Pro to today's visit.       Impression/Plan:   1.  Nova had an ultrasound and blood draw for first trimester screening.  Results are expected within 7 days, and will be available in [employer].  We will contact her to discuss the results, and a copy will be forwarded to the office of the referring OB provider. Nova provided verbal permission for results to be left on her voicemail.     2. Maternal serum AFP (single marker screen) is recommended after 15 weeks to screen for open neural tube defects. A quad screen should not be performed.    3. Plan for level II comprehensive ultrasound and fetal echocardiogram given IVF pregnancy.     4. Release of information signed for expanded carrier screening result from [facility]. Will review with patient at time of FTS result.     Pregnancy History:   /Parity:    Age at Delivery: 30 year old  МАРИНА: 2021, by Ultrasound  Gestational Age: 13w5d    No significant complications or exposures were reported in the current pregnancy.    This pregnancy was achieved via IVF using Nova's 31yo egg and her partner, Pro's sperm. Two frozen 5 day embryos were transferred on 2020. PGT-A was reportedly not performed. We discussed that fetal echocardiograms are typically recommended for In Vitro Fertilization (IVF) pregnancies. The average pregnancy has a 0.5-1.0% chance of a congenital heart defect. However, studies suggest an increased chance for a heart defect for IVF pregnancies, with estimates ranging from 1-3.3%. Fetal echocardiograms are typically performed at 20 to 24 weeks gestation.      Lencho  paternal first cousin once removed was identified to have cystic fibrosis. Cystic fibrosis (CF) is a genetic condition caused by mutations in the CFTR gene that causes thick mucus in the lungs and digestive system. Nova reported undergoing cystic fibrosis carrier screening through her reproductive medicine clinic, which was reportedly negative. Result was not available for review today.  Release of information was signed today for expanded carrier screening result from [facility]. We will plan to review this result when I call with first trimester screening result.     Medical History:   Nova s reported medical history is not expected to impact pregnancy management or risks to fetal development.       Family History:   A three-generation pedigree was obtained, and is scanned under the  Media  tab.   The following significant findings were reported by Nova:  Nova's partner, Pro is 30. He was reported to have metastatic testicular cancer diagnosed at age 19, doing well now. Pro's maternal uncle was reported to have prostate cancer. Pro's maternal grandmother was reported to have breast cancer in her 70's. Pro's maternal grandfather was reported to have lung cancer, he was noted to be a smoker. Nova's maternal aunt was reported to have a history of leukemia. We discussed how most cancer seen in families occurs sporadically, but about 5-10% may be due to an underlying genetic etiology. The couple was encouraged to share this family history information with their primary care providers to ensure appropriate screening. They were also encouraged to share any relevant family history information with their pediatrician.     Otherwise, the reported family history is negative for multiple miscarriages, stillbirths, birth defects, intellectual disability, known genetic conditions, and consanguinity.       Carrier Screening:   The patient reports that she and the father of the pregnancy have  ancestry:     Cystic fibrosis is an  autosomal recessive genetic condition that occurs with increased frequency in individuals of  ancestry and carrier screening for this condition is available.  In addition,  screening in the [facility] includes cystic fibrosis.      Expanded carrier screening for mutations in a large panel of genes associated with autosomal recessive conditions including cystic fibrosis, spinal muscular atrophy, and others, is now available.      Nova reported undergoing expanded carrier screening through her reproductive medicine clinic ([facility]). 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 Result was not available for review today. Release of information was signed today for expanded carrier screening result from [facility]. We will plan to review this result when I call with first trimester screening result.     Risk Assessment for Chromosome Conditions:   We explained that the risk for fetal chromosome abnormalities increases with maternal age. We discussed specific features of common chromosome abnormalities, including Down syndrome, trisomy 13, trisomy 18, and sex chromosome trisomies.      - At age 30 at midtrimester, the risk to have a baby with Down syndrome is 1 in 597.    - At age 30 at midtrimester, the risk to have a baby with any chromosome abnormality is 1 in 299.     Testing Options:   We discussed the following options:   First trimester screening    First trimester ultrasound with nuchal translucency and nasal bone assessments, maternal plasma hCG, JENNA-A, and AFP measurement    Screens for fetal trisomy 21, trisomy 13, and trisomy 18    Cannot screen for open neural tube defects; maternal serum AFP after 15 weeks is recommended     Non-invasive Prenatal Testing (NIPT)    Maternal plasma cell-free DNA testing;  first trimester ultrasound with nuchal translucency and nasal bone assessment is recommended, when appropriate    Screens for fetal trisomy 21, trisomy 13, trisomy 18, and sex chromosome aneuploidy    Cannot screen for open neural tube defects; maternal serum AFP after 15 weeks is recommended     Chorionic villus sampling (CVS)    Invasive procedure typically performed in the first trimester by which placental villi are obtained for the purpose of chromosome analysis and/or other prenatal genetic analysis    Diagnostic results; >99% sensitivity for fetal chromosome abnormalities    Cannot test for open neural tube defects; maternal serum AFP after 15 weeks is recommended     Genetic Amniocentesis    Invasive procedure typically performed in the second trimester by which amniotic fluid is obtained for the purpose of chromosome analysis and/or other prenatal genetic analysis    Diagnostic results; >99% sensitivity for fetal chromosome abnormalities    AFAFP measurement tests for open neural tube defects     Comprehensive (Level II) ultrasound: Detailed ultrasound performed between 18-22 weeks gestation to screen for major birth defects and markers for aneuploidy.      We reviewed the benefits and limitations of this testing.  Screening tests provide a risk assessment specific to the pregnancy for certain fetal chromosome abnormalities, but cannot definitively diagnose or exclude a fetal chromosome abnormality.  Follow-up genetic counseling and consideration of diagnostic testing is recommended with any abnormal screening result.      It was a pleasure to be involved with [facility]. Face-to-face time of the meeting was 35 minutes.    Hawa Vora MS, [facility]  Maternal Fetal Medicine  [facility]  Ph: [phone]  [email or web address]

## 2020-10-05 NOTE — PROGRESS NOTES
Please see full imaging report from [employer] program under imaging tab.    Brandi Ventura MD  Maternal Fetal Medicine

## 2020-10-07 ENCOUNTER — TELEPHONE (OUTPATIENT)
Dept: MATERNAL FETAL MEDICINE | Facility: CLINIC | Age: 30
End: 2020-10-07

## 2020-10-07 DIAGNOSIS — O09.811 PREGNANCY RESULTING FROM IN VITRO FERTILIZATION IN FIRST TRIMESTER: Primary | ICD-10-CM

## 2020-10-07 DIAGNOSIS — O26.90 PREGNANCY RELATED CONDITION, ANTEPARTUM: ICD-10-CM

## 2020-10-07 DIAGNOSIS — O28.0 ABNORMAL MATERNAL SERUM SCREENING TEST: ICD-10-CM

## 2020-10-07 NOTE — TELEPHONE ENCOUNTER
October 7, 2020  Left a message for Nova regarding her first trimester screening results.    These results indicated a 1 in 505 risk for Down syndrome and a 1 in > 10,000 risk for trisomy 18/13. This result was reviewed today with Dr. Lin. The NT measurement was 1.9 mm, the nasal bone was present, the JENNA-A was 0.44 MoM and the free BhCG was 2.45 MoM. The risk for Down syndrome has increased from Nova's age related risk prior to screening, however is below the screening cutoff of 1/317. Noninvasive prenatal testing (NIPT) will remain available to Nova if she is interested in additional screening. Diagnostic testing will also remain available.     This screening test gives information about the risk of some chromosome conditions in a pregnancy, but does not definitively diagnose or exclude the presence of these chromosome conditions. There remains a small chance for a false positive or false negative result. This screen also does not address all chromosome abnormalities or all causes of birth defects and cognitive delay.    A copy of these results are available in her EPIC chart for her primary OB to review.    Maternal serum AFP only is recommended in the second trimester to screen for neural tube defects.    This information was left in Nova's voicemail per plan established at her genetic counseling appointment, and Nova was encouraged to reach out if she has any questions or concerns. Still waiting on carrier screening result to be sent over from [facility] to review with Nova.    Hawa Vora MS, [facility]  Genetic Counselor  Phone: [phone]  Pager: [phone]

## 2020-10-12 ENCOUNTER — OFFICE VISIT (OUTPATIENT)
Dept: OBGYN | Facility: CLINIC | Age: 30
End: 2020-10-12
Attending: ADVANCED PRACTICE MIDWIFE
Payer: COMMERCIAL

## 2020-10-12 VITALS
WEIGHT: 144.9 LBS | SYSTOLIC BLOOD PRESSURE: 116 MMHG | DIASTOLIC BLOOD PRESSURE: 75 MMHG | HEIGHT: 68 IN | HEART RATE: 76 BPM | BODY MASS INDEX: 21.96 KG/M2

## 2020-10-12 DIAGNOSIS — Z78.9 CONCEIVED BY IN VITRO FERTILIZATION: ICD-10-CM

## 2020-10-12 DIAGNOSIS — O43.199 MARGINAL INSERTION OF UMBILICAL CORD AFFECTING MANAGEMENT OF MOTHER: ICD-10-CM

## 2020-10-12 DIAGNOSIS — R87.610 ASCUS OF CERVIX WITH NEGATIVE HIGH RISK HPV: ICD-10-CM

## 2020-10-12 DIAGNOSIS — O09.90 HIGH-RISK PREGNANCY, UNSPECIFIED TRIMESTER: ICD-10-CM

## 2020-10-12 PROCEDURE — G0463 HOSPITAL OUTPT CLINIC VISIT: HCPCS

## 2020-10-12 PROCEDURE — 87591 N.GONORRHOEAE DNA AMP PROB: CPT | Performed by: ADVANCED PRACTICE MIDWIFE

## 2020-10-12 PROCEDURE — 99207 PR PRENATAL VISIT: CPT | Performed by: ADVANCED PRACTICE MIDWIFE

## 2020-10-12 PROCEDURE — 87491 CHLMYD TRACH DNA AMP PROBE: CPT | Performed by: ADVANCED PRACTICE MIDWIFE

## 2020-10-12 ASSESSMENT — ANXIETY QUESTIONNAIRES
7. FEELING AFRAID AS IF SOMETHING AWFUL MIGHT HAPPEN: NOT AT ALL
1. FEELING NERVOUS, ANXIOUS, OR ON EDGE: NOT AT ALL
GAD7 TOTAL SCORE: 0
3. WORRYING TOO MUCH ABOUT DIFFERENT THINGS: NOT AT ALL
6. BECOMING EASILY ANNOYED OR IRRITABLE: NOT AT ALL
5. BEING SO RESTLESS THAT IT IS HARD TO SIT STILL: NOT AT ALL
2. NOT BEING ABLE TO STOP OR CONTROL WORRYING: NOT AT ALL
IF YOU CHECKED OFF ANY PROBLEMS ON THIS QUESTIONNAIRE, HOW DIFFICULT HAVE THESE PROBLEMS MADE IT FOR YOU TO DO YOUR WORK, TAKE CARE OF THINGS AT HOME, OR GET ALONG WITH OTHER PEOPLE: NOT DIFFICULT AT ALL

## 2020-10-12 ASSESSMENT — PATIENT HEALTH QUESTIONNAIRE - PHQ9
SUM OF ALL RESPONSES TO PHQ QUESTIONS 1-9: 0
5. POOR APPETITE OR OVEREATING: NOT AT ALL

## 2020-10-12 ASSESSMENT — PAIN SCALES - GENERAL: PAINLEVEL: NO PAIN (0)

## 2020-10-12 ASSESSMENT — MIFFLIN-ST. JEOR: SCORE: 1420.63

## 2020-10-12 NOTE — LETTER
10/12/2020       RE: Nova Matthews  [address]  [facility] [address]     Dear Colleague,    Thank you for referring your patient, Nova Matthews, to the [facility] [place] [facility]. Please see a copy of my visit note below.    SUBJECTIVE:   Nova is a 30 year old female who presents to clinic for a new OB visit.   at 14w5d with Estimated Date of Delivery: 2021 based on US confirms. Feels well. Has  started PNV.     She has not had bleeding since her LMP.   She has had mild nausea. Weight loss has not occurred.   This was a planned pregnancy.   Partner is involved  OTHER CONCERNS:     - Continues to take daily low dose aspirin  - Nausea has improved. Continues to take Vit B6 and half tab of Unisom at bedtime.   - Pt is taking Vit D, and has decreased to taking it every other day since last Vit D lab was on the higher end of the normal range.  - Appetite has improved. Eating 3 meals with snacks in between. Drinking about 70-90 oz of water daily.   - Offered AFP: Pt accepted, and ordered    HX abn pap:   - 2017, ASCUS with negative high risk HPV. Follow up recommended in 3 years.  - Pt had colposcopy completed at [facility] on [place] and another pap on 2019. Pt reported results were normal. Would like to defer pap until postpartum.     ===========================================   ROS: 10 point ROS neg other than the symptoms noted above in the HPI.      PSYCHIATRIC:  Denies mood changes.      PHQ-9 score:    PHQ-9 SCORE 10/12/2020   PHQ-9 Total Score 0           ERIC-7 SCORE 10/12/2020   Total Score 0         Past History:  Her past medical history   Past Medical History:   Diagnosis Date     NO ACTIVE PROBLEMS    .   This is her first pregnancy  Since her last LMP she denies use of alcohol, tobacco and street drugs.  HISTORY:  Family History   Problem Relation Age of Onset     Hypertension Mother 50     Hyperthyroidism Mother 13      Alzheimer Disease Maternal Grandmother 84     Hypertension Maternal Grandfather 60     Alzheimer Disease Paternal Grandmother 80     Skin Cancer Other 78     Social History     Socioeconomic History     Marital status:      Spouse name: Pro     Number of children: 0     Years of education: Not on file     Highest education level: Not on file   Occupational History     Occupation: NICU RN     Comment: [employer]   Social Needs     Financial resource strain: Not hard at all     Food insecurity     Worry: Never true     Inability: Never true     Transportation needs     Medical: No     Non-medical: No   Tobacco Use     Smoking status: Never Smoker     Smokeless tobacco: Never Used   Substance and Sexual Activity     Alcohol use: Not Currently     Drug use: Never     Sexual activity: Yes     Partners: Male     Birth control/protection: None   Lifestyle     Physical activity     Days per week: 3 days     Minutes per session: 30 min     Stress: Only a little   Relationships     Social connections     Talks on phone: More than three times a week     Gets together: Twice a week     Attends Baptism service: More than 4 times per year     Active member of club or organization: No     Attends meetings of clubs or organizations: Never     Relationship status:      Intimate partner violence     Fear of current or ex partner: No     Emotionally abused: No     Physically abused: No     Forced sexual activity: No   Other Topics Concern     Not on file   Social History Narrative    How much exercise per week? Walking 4-5 x's     How much calcium per day? In foods and PNV       How much caffeine per day? none    How much vitamin D per day? supplement    Do you/your family wear seatbelts?  Yes    Do you/your family use safety helmets? Yes    Do you/your family use sunscreen? Yes    Do you/your family keep firearms in the home? Yes    Do you/your family have a smoke detector(s)? Yes        September 28, 2020 Murali Hsu  "LPN         Current Outpatient Medications   Medication Sig     aspirin (ASA) 81 MG chewable tablet Take 81 mg by mouth daily     Cholecalciferol (VITAMIN D-3) 125 MCG (5000 UT) TABS      FOLIC ACID PO Take 1 mg by mouth daily     Prenatal Vit-Fe Fumarate-FA (PRENATAL MULTIVITAMIN W/IRON) 27-0.8 MG tablet Take 1 tablet by mouth daily     sennosides (SENOKOT) 8.6 MG tablet Take 1 tablet by mouth daily     No current facility-administered medications for this visit.      Allergies   Allergen Reactions     Augmentin GI Disturbance       ============================================  MEDICAL HISTORY  Past Medical History:   Diagnosis Date     NO ACTIVE PROBLEMS      Past Surgical History:   Procedure Laterality Date     EXTRACTION(S) DENTAL      as a child, never as an adult     HC HYSTEROSCOPY W ENDOMETRIAL BX/POLYPECTOMY W/WO D&C       HERNIA REPAIR Bilateral 1994    bilateral inguinal      KNEE SURGERY  2016    left knee surgery       OB History    Para Term  AB Living   1 0 0 0 0 0   SAB TAB Ectopic Multiple Live Births   0 0 0 0 0      # Outcome Date GA Lbr Mejia/2nd Weight Sex Delivery Anes PTL Lv   1 Current              GYN History-   HX abn pap:   - 2017, ASCUS with negative high risk HPV. Follow up recommended in 3 years.  - Pt had colposcopy completed at [facility] on [place] and another pap on 2019. Pt reported results were normal. Would like to defer pap until postpartum.    I personally reviewed the past social/family/medical and surgical history on the date of service.   I reviewed lab work done at Intake visit with patient.    EXAM:  /75   Pulse 76   Ht 1.727 m (5' 7.99\")   Wt 65.7 kg (144 lb 14.4 oz)   BMI 22.04 kg/m     EXAM:  GENERAL:  Pleasant pregnant female, alert, cooperative  and well groomed.  SKIN:  Warm and dry, without lesions or rashes  HEAD: Symmetrical features.  MOUTH:  Pt was wearing face mask.   NECK:  Thyroid without enlargement and nodules.  " Lymph nodes not palpable.   LUNGS:  Clear to auscultation.  BREAST:    No dominant, fixed or suspicious masses are noted.  No skin or nipple changes or axillary nodes.   Nipples everted.      HEART:  RRR without murmur.  ABDOMEN: Soft without masses , tenderness or organomegaly.  No CVA tenderness.  Uterus not palpable at size equal to dates.  No scars noted.. Fetal heart tones present.  MUSCULOSKELETAL:  Full range of motion  EXTREMITIES:  No edema. No significant varicosities.   PELVIC EXAM:  GENITALIA: EGBUS  External genitalia, Bartholin's glands, urethra & [place]'s glands:normal. Vulva reveals no erythema or lesions.         VAGINA:  pink, normal rugae and discharge, no lesions, good tone.   CERVIX:  smooth, without discharge or CMT.               UTERUS: Anteverted,  nontender   ADNEXA:  Without masses or tenderness.   RECTAL:  Normal appearance.  Digital exam deferred.  WET PREP:Not done  GC/CHLAMYDIA CULTURE OBTAINED:YES    No results found for: PAP     Recommended Flu Vaccine.  Patient already received Flu Vaccine      ASSESSMENT:  30 year old , 14w5d weeks of pregnancy with МАРИНА of 2021 by US confirms  Intrauterine pregnancy 14w5d size is consistent with dates  Genetic Screening: First Trimester Screen done; agreeable to AFP      ICD-10-CM    1. High-risk pregnancy, unspecified trimester  O09.90 AFP - Alpha Fetoprotein Maternal Screen   2. Conceived by in vitro fertilization  Z78.9    3. Marginal insertion of umbilical cord affecting management of mother  O43.199    4. ASCUS of cervix with negative high risk HPV  R87.610        PLAN:  - Reviewed use of triage nurse line and contacting the on-call provider after hours for an urgent need such as fever, vagina bleeding, bladder or vaginal infection, rupture of membranes,  or term labor.    - Reviewed best evidence for: weight gain for her weight and height for pregnancy:  Based on pre-pregnancy Body mass index is 22.04 kg/m . RECOMMENDED  WEIGHT GAIN: 25-35 lbs.    - Reviewed healthy diet and foods to avoid, exercise and activity during pregnancy; avoiding exposure to toxoplasmosis; and maintenance of a generally healthy lifestyle.   - Discussed the harms, benefits, side effects and alternative therapies for current prescribed and OTC medications.    - Reviewed high risk for gestational diabetes d/t No known risk factors for GDM, IS NOT agreeable to early 1 hour today.  - Reviewed Moderate risk for Pre Eclampsia d/t No known risk factors of High risk for Pre E  or meets two or more of the moderate risk factors including Nulliparity  and IVF and IS agreeable to starting 81mg aspirin daily after 12 weeks .    - All pt's  questions discussed and answered.  Pt verbalized understanding of and agreement to plan of care.     - Continue scheduled prenatal care and prn if questions or concerns.  - Return to clinic in 4 weeks.    I, Ramin Ramírez, am serving as a scribe; to document services personally performed by  Deirdre Alas based on data collection and the provider's statements to me.     Ramin Ramírez, BSN, DNP Student                Again, thank you for allowing me to participate in the care of your patient.      Sincerely,    JAI Cole CNM

## 2020-10-12 NOTE — LETTER
Date:October 14, 2020      Patient was self referred, no letter generated. Do not send.        [facility] Information

## 2020-10-12 NOTE — TELEPHONE ENCOUNTER
October 12, 2020    Received call from Nova stating that she received voicemail with first trimester screen result, which we reviewed again today.    These results indicated a 1 in 505 risk for Down syndrome which is increased from Nova's age related risk prior to screening, however is below the screening cutoff of 1/317. Reviewed that a normal NT ultrasound and second trimester ultrasound cannot completely rule out the possibility of Down syndrome in a pregnancy. Noninvasive prenatal testing (NIPT) will remain available to Nova if she is interested in additional screening. She shared that at this time she feels comfortable, but will reach out if anything changes.     I left a voicemail for [facility] medical records and have not heard back regarding carrier screen result. Will reach out again today and will call Nova as soon as we receive a copy to review.     Hawa Vora MS, [facility]  Maternal Fetal Medicine  [facility]  Ph: [phone]  [email or web address]

## 2020-10-12 NOTE — PROGRESS NOTES
SUBJECTIVE:   Nova is a 30 year old female who presents to clinic for a new OB visit.   at 14w5d with Estimated Date of Delivery: 2021 based on US confirms. Feels well. Has  started PNV.     She has not had bleeding since her LMP.   She has had mild nausea. Weight loss has not occurred.   This was a planned pregnancy.   Partner is involved  OTHER CONCERNS:     - Continues to take daily low dose aspirin  - Nausea has improved. Continues to take Vit B6 and half tab of Unisom at bedtime.   - Pt is taking Vit D, and has decreased to taking it every other day since last Vit D lab was on the higher end of the normal range.  - Appetite has improved. Eating 3 meals with snacks in between. Drinking about 70-90 oz of water daily.   - Offered AFP: Pt accepted, and ordered    HX abn pap:   - 2017, ASCUS with negative high risk HPV. Follow up recommended in 3 years.  - Pt had [place] [facility] [place] and another pap on 2019. Pt reported results were normal. Would like to defer pap until postpartum.     ===========================================   ROS: 10 point ROS neg other than the symptoms noted above in the HPI.      PSYCHIATRIC:  Denies mood changes.      PHQ-9 score:    PHQ-9 SCORE 10/12/2020   PHQ-9 Total Score 0           ERIC-7 SCORE 10/12/2020   Total Score 0         Past History:  Her past medical history   Past Medical History:   Diagnosis Date     NO ACTIVE PROBLEMS    .   This is her first pregnancy  Since her last LMP she denies use of alcohol, tobacco and street drugs.  HISTORY:  Family History   Problem Relation Age of Onset     Hypertension Mother 50     Hyperthyroidism Mother 13     Alzheimer Disease Maternal Grandmother 84     Hypertension Maternal Grandfather 60     Alzheimer Disease Paternal Grandmother 80     Skin Cancer Other 78     Social History     Socioeconomic History     Marital status:      Spouse name: Pro     Number of children: 0     Years of  education: Not on file     Highest education level: Not on file   Occupational History     Occupation: NICU RN     Comment: [employer]   Social Needs     Financial resource strain: Not hard at all     Food insecurity     Worry: Never true     Inability: Never true     Transportation needs     Medical: No     Non-medical: No   Tobacco Use     Smoking status: Never Smoker     Smokeless tobacco: Never Used   Substance and Sexual Activity     Alcohol use: Not Currently     Drug use: Never     Sexual activity: Yes     Partners: Male     Birth control/protection: None   Lifestyle     Physical activity     Days per week: 3 days     Minutes per session: 30 min     Stress: Only a little   Relationships     Social connections     Talks on phone: More than three times a week     Gets together: Twice a week     Attends [employer] service: More than 4 times per year     Active member of club or organization: No     Attends meetings of clubs or organizations: Never     Relationship status:      Intimate partner violence     Fear of current or ex partner: No     Emotionally abused: No     Physically abused: No     Forced sexual activity: No   Other Topics Concern     Not on file   Social History Narrative    How much exercise per week? Walking 4-5 x's     How much calcium per day? In foods and PNV       How much caffeine per day? none    How much vitamin D per day? supplement    Do you/your family wear seatbelts?  Yes    Do you/your family use safety helmets? Yes    Do you/your family use sunscreen? Yes    Do you/your family keep firearms in the home? Yes    Do you/your family have a smoke detector(s)? Yes        September 28, 2020 Murali Hsu LPN         Current Outpatient Medications   Medication Sig     aspirin (ASA) 81 MG chewable tablet Take 81 mg by mouth daily     Cholecalciferol (VITAMIN D-3) 125 MCG (5000 UT) TABS      FOLIC ACID PO Take 1 mg by mouth daily     Prenatal Vit-Fe Fumarate-FA (PRENATAL MULTIVITAMIN W/IRON)  "27-0.8 MG tablet Take 1 tablet by mouth daily     sennosides (SENOKOT) 8.6 MG tablet Take 1 tablet by mouth daily     No current facility-administered medications for this visit.      Allergies   Allergen Reactions     Augmentin GI Disturbance       ============================================  MEDICAL HISTORY  Past Medical History:   Diagnosis Date     NO ACTIVE PROBLEMS      Past Surgical History:   Procedure Laterality Date     EXTRACTION(S) DENTAL      as a child, never as an adult     HC HYSTEROSCOPY W ENDOMETRIAL BX/POLYPECTOMY W/WO D&C       HERNIA REPAIR Bilateral 1994    bilateral inguinal      KNEE SURGERY  2016    left knee surgery       OB History    Para Term  AB Living   1 0 0 0 0 0   SAB TAB Ectopic Multiple Live Births   0 0 0 0 0      # Outcome Date GA Lbr Mejia/2nd Weight Sex Delivery Anes PTL Lv   1 Current              GYN History-   HX abn pap:   - 2017, ASCUS with negative high risk HPV. Follow up recommended in 3 years.  - Pt had colposcopy completed [place] [facility] on [place] and another pap on 2019. Pt reported results were normal. Would like to defer pap until postpartum.    I personally reviewed the past social/family/medical and surgical history on the date of service.   I reviewed lab work done at Intake visit with patient.    EXAM:  /75   Pulse 76   Ht 1.727 m (5' 7.99\")   Wt 65.7 kg (144 lb 14.4 oz)   BMI 22.04 kg/m     EXAM:  GENERAL:  Pleasant pregnant female, alert, cooperative  and well groomed.  SKIN:  Warm and dry, without lesions or rashes  HEAD: Symmetrical features.  MOUTH:  Pt was wearing face mask.   NECK:  Thyroid without enlargement and nodules.  Lymph nodes not palpable.   LUNGS:  Clear to auscultation.  BREAST:    No dominant, fixed or suspicious masses are noted.  No skin or nipple changes or axillary nodes.   Nipples everted.      HEART:  RRR without murmur.  ABDOMEN: Soft without masses , tenderness or organomegaly.  No CVA " tenderness.  Uterus not palpable at size equal to dates.  No scars noted.. Fetal heart tones present.  MUSCULOSKELETAL:  Full range of motion  EXTREMITIES:  No edema. No significant varicosities.   PELVIC EXAM:  GENITALIA: EGBUS  External genitalia, Bartholin's glands, urethra & [place]'s glands:normal. Vulva reveals no erythema or lesions.         VAGINA:  pink, normal rugae and discharge, no lesions, good tone.   CERVIX:  smooth, without discharge or CMT.               UTERUS: Anteverted,  nontender   ADNEXA:  Without masses or tenderness.   RECTAL:  Normal appearance.  Digital exam deferred.  WET PREP:Not done  GC/CHLAMYDIA CULTURE OBTAINED:YES    No results found for: PAP     Recommended Flu Vaccine.  Patient already received Flu Vaccine      ASSESSMENT:  30 year old , 14w5d weeks of pregnancy with МАРИНА of 2021 by US confirms  Intrauterine pregnancy 14w5d size is consistent with dates  Genetic Screening: First Trimester Screen done; agreeable to AFP      ICD-10-CM    1. High-risk pregnancy, unspecified trimester  O09.90 AFP - Alpha Fetoprotein Maternal Screen     Gonorrhea PCR     Chlamydia by PCR   2. Conceived by in vitro fertilization  Z78.9    3. Marginal insertion of umbilical cord affecting management of mother  O43.199    4. ASCUS of cervix with negative high risk HPV  R87.610        PLAN:  - Reviewed use of triage nurse line and contacting the on-call provider after hours for an urgent need such as fever, vagina bleeding, bladder or vaginal infection, rupture of membranes,  or term labor.    - Reviewed best evidence for: weight gain for her weight and height for pregnancy:  Based on pre-pregnancy Body mass index is 22.04 kg/m . RECOMMENDED WEIGHT GAIN: 25-35 lbs.    - Reviewed healthy diet and foods to avoid, exercise and activity during pregnancy; avoiding exposure to toxoplasmosis; and maintenance of a generally healthy lifestyle.   - Discussed the harms, benefits, side effects and  alternative therapies for current prescribed and OTC medications.    - Reviewed high risk for gestational diabetes d/t No known risk factors for GDM, IS NOT agreeable to early 1 hour today.  - Reviewed Moderate risk for Pre Eclampsia d/t No known risk factors of High risk for Pre E  or meets two or more of the moderate risk factors including Nulliparity  and IVF and IS agreeable to starting 81mg aspirin daily after 12 weeks .    - All pt's  questions discussed and answered.  Pt verbalized understanding of and agreement to plan of care.     - Continue scheduled prenatal care and prn if questions or concerns.  - Return to clinic in 4 weeks.    I, Ramin Ramírez, am serving as a scribe; to document services personally performed by  Deirdre Alas based on data collection and the provider's statements to me.     Ramin Ramírez, BSN, DNP Student    I agree with the PFSH and ROS as completed by the student, except for changes made by me. The remainder of the encounter was performed by me and scribed by the student. The scribed note accurately reflects my personal services and decisions made by me.  Deirdre Alas, DNP, CNM, APRN

## 2020-10-13 LAB
C TRACH DNA SPEC QL NAA+PROBE: NEGATIVE
N GONORRHOEA DNA SPEC QL NAA+PROBE: NEGATIVE
SPECIMEN SOURCE: NORMAL
SPECIMEN SOURCE: NORMAL

## 2020-10-13 ASSESSMENT — ANXIETY QUESTIONNAIRES: GAD7 TOTAL SCORE: 0

## 2020-10-15 LAB — LAB SCANNED RESULT: NORMAL

## 2020-10-16 NOTE — TELEPHONE ENCOUNTER
October 16, 2020    Left voicemail for Nova regarding Fragile X syndrome carrier screening result received from reproductive medicine clinic. We had planned to review this result together in more detail. Left direct contact information for call back.     Hawa Vora MS, [facility]  Maternal Fetal Medicine  [facility]  Ph: [phone]  [email or web address]

## 2020-10-22 NOTE — TELEPHONE ENCOUNTER
October 22, 2020    I spoke with Nova and she is interested in pursuing NIPT given first trimester screen increased the risk for Down syndrome from her age related risk prior to screening, however is below the screening cutoff. Informed consent for collection of genetic information was obtained over the phone and Nova plans to have drawn at [place] at time of AFP draw tomorrow. Nova provided verbal permission for results to be left on her voicemail. She requested the results do not include predicted fetal sex information. She was made aware that results may be released directly to her. She would like to defer reviewing Fragile X carrier screen result as she is currently at work. We reviewed that this can be discussed at time of NIPT result or when she returns for comprehensive ultrasound on 11/13.     Hawa Vora MS, [facility]  Maternal Fetal Medicine  [facility]  Ph: [phone]  [email or web address]

## 2020-10-22 NOTE — TELEPHONE ENCOUNTER
October 22, 2020    Received voicemail from Nova to discuss Fragile X carrier screening result. She is also interested in pursuing NIPT at the time of blood draw for AFP which has been ordered my primary OB.     October 22, 2020    I left a voicemail for Nova. AFP is ordered and can be drawn at any [facility] lab. NIPT would require informed consent which can be completed over the phone and patient can present to [facility] outpatient lab in [facility] and I can have the NIPT kit ready to go waiting at this laboratory when she is planning to go. Reviewed that Fragile X carrier screening result is in intermediate range in which case Nova's children are not at risk to be affected with Fragile X syndrome and we can review this result in further detail when she is available. Provided direct call back number and clinic hours for her to reach me.     Hawa Vora MS, [facility]  Maternal Fetal Medicine  [facility]  Ph: [phone]  [email or web address]

## 2020-10-23 DIAGNOSIS — O09.811 PREGNANCY RESULTING FROM IN VITRO FERTILIZATION IN FIRST TRIMESTER: ICD-10-CM

## 2020-10-23 DIAGNOSIS — O28.0 ABNORMAL MATERNAL SERUM SCREENING TEST: ICD-10-CM

## 2020-10-23 DIAGNOSIS — O09.90 HIGH-RISK PREGNANCY, UNSPECIFIED TRIMESTER: ICD-10-CM

## 2020-10-23 DIAGNOSIS — O26.90 PREGNANCY RELATED CONDITION, ANTEPARTUM: ICD-10-CM

## 2020-10-23 PROCEDURE — 999N001100 HC STATISTIC VERIFI PRENATAL TRISOMY 21,18,13: Performed by: OBSTETRICS & GYNECOLOGY

## 2020-10-23 PROCEDURE — 82105 ALPHA-FETOPROTEIN SERUM: CPT | Performed by: ADVANCED PRACTICE MIDWIFE

## 2020-10-23 PROCEDURE — 36415 COLL VENOUS BLD VENIPUNCTURE: CPT | Performed by: ADVANCED PRACTICE MIDWIFE

## 2020-10-25 LAB
# FETUSES US: NORMAL
# FETUSES: NORMAL
AFP ADJ MOM AMN: 2.17
AFP SERPL-MCNC: 76 NG/ML
AGE - REPORTED: 31.7 YR
CURRENT SMOKER: NO
CURRENT SMOKER: NO
DIABETES STATUS PATIENT: NO
FAMILY MEMBER DISEASES HX: NO
FAMILY MEMBER DISEASES HX: NO
GA METHOD: NORMAL
GA METHOD: NORMAL
GA: NORMAL WK
IDDM PATIENT QL: NO
INTEGRATED SCN PATIENT-IMP: NORMAL
LMP START DATE: NORMAL
MONOCHORIONIC TWINS: NO
SERVICE CMNT-IMP: NO
SPECIMEN DRAWN SERPL: NORMAL
VALPROIC/CARBAMAZEPINE STATUS: NO
WEIGHT UNITS: NORMAL

## 2020-10-28 ENCOUNTER — TELEPHONE (OUTPATIENT)
Dept: MATERNAL FETAL MEDICINE | Facility: CLINIC | Age: 30
End: 2020-10-28

## 2020-10-28 LAB — LAB SCANNED RESULT: NORMAL

## 2020-10-28 NOTE — TELEPHONE ENCOUNTER
October 28, 2020  Left a message for Nova regarding her NIPT results.     Results indicate NO ANEUPLOIDY DETECTED for chromosomes 21, 18, 13, or sex chromosomes. Fetal sex information was NOT disclosed per patient request.     This puts her current pregnancy at low risk for Down syndrome, trisomy 18, trisomy 13 and sex chromosome abnormalities. This test is reported to have the following sensitivities: Down syndrome: 99.2%, trisomy 18: >99.9%, and trisomy 13: >99.9%. Although these results are reassuring, this does not replace a standard chromosome analysis from a chorionic villus sampling or amniocentesis.     Her results are available in her Epic chart for her primary OB to review.    This information was left in Nova's voicemail per plan established at her genetic counseling appointment, and Nova was encouraged to reach out if she has any questions or concerns.      Hawa Vora MS, [facility]  Genetic Counselor  Phone: [phone]  Pager: [phone]

## 2020-10-28 NOTE — TELEPHONE ENCOUNTER
October 28, 2020    Fetal sex disclosed to Nova's friend, Clarisa by phone per patient request.     Hawa Vora MS, [facility]  Maternal Fetal Medicine  [facility]  Ph: [phone]  [email or web address]

## 2020-10-28 NOTE — TELEPHONE ENCOUNTER
October 28, 2020    Received call from Nova requesting fetal sex information be communicated to her friend, Clarisa at [phone]. She provided verbal permission for fetal sex to be disclosed to her or left in her voicemail.     Hawa Vora MS, [facility]  Maternal Fetal Medicine  [facility]  Ph: [phone]  [email or web address]

## 2020-11-09 ENCOUNTER — OFFICE VISIT (OUTPATIENT)
Dept: OBGYN | Facility: CLINIC | Age: 30
End: 2020-11-09
Attending: OBSTETRICS & GYNECOLOGY
Payer: COMMERCIAL

## 2020-11-09 VITALS
WEIGHT: 150.3 LBS | BODY MASS INDEX: 22.86 KG/M2 | SYSTOLIC BLOOD PRESSURE: 111 MMHG | HEART RATE: 67 BPM | DIASTOLIC BLOOD PRESSURE: 70 MMHG

## 2020-11-09 DIAGNOSIS — O09.90 HIGH-RISK PREGNANCY, UNSPECIFIED TRIMESTER: Primary | ICD-10-CM

## 2020-11-09 PROCEDURE — 99207 PR PRENATAL VISIT: CPT | Performed by: OBSTETRICS & GYNECOLOGY

## 2020-11-09 PROCEDURE — G0463 HOSPITAL OUTPT CLINIC VISIT: HCPCS

## 2020-11-09 ASSESSMENT — ANXIETY QUESTIONNAIRES
6. BECOMING EASILY ANNOYED OR IRRITABLE: NOT AT ALL
5. BEING SO RESTLESS THAT IT IS HARD TO SIT STILL: NOT AT ALL
GAD7 TOTAL SCORE: 0
3. WORRYING TOO MUCH ABOUT DIFFERENT THINGS: NOT AT ALL
2. NOT BEING ABLE TO STOP OR CONTROL WORRYING: NOT AT ALL
1. FEELING NERVOUS, ANXIOUS, OR ON EDGE: NOT AT ALL
7. FEELING AFRAID AS IF SOMETHING AWFUL MIGHT HAPPEN: NOT AT ALL

## 2020-11-09 ASSESSMENT — PATIENT HEALTH QUESTIONNAIRE - PHQ9
5. POOR APPETITE OR OVEREATING: NOT AT ALL
SUM OF ALL RESPONSES TO PHQ QUESTIONS 1-9: 1

## 2020-11-09 NOTE — PROGRESS NOTES
"NEDA Visit 20    S:  Nova is a healthy 30 year old female  at 18w5d gestation via IVF dating. She has been feeling well overall. She denies VB, LOF, SOB, CP, vomiting, contractions, RUQ pain, or pelvic pain. She does note that she has always had headaches on and off, even prior to pregnancy. She gets a headache about once a week or once every other week, and Tylenol usually helps. She does not have any vision changes with these headaches. She does take vitamin B6 and Unisom for nausea and she thinks this has been very helpful for her symptoms. She has tried not taking them a few times, but then she thinks she does not feel well the next day. She has small, random cramping in her abdomen that only last a few seconds. These cramps are sporadic.     Nova also has a history of constipation that has been worse with IVF treatments and pregnancy. She takes Senokot regularly and takes Miralax when she feels she needs it.    She had questions about a previous provider telling her she had \"marginal insertion of umbilical cord\" and wanted to learn more about this and if she should be worried.     O:  See OB Flowsheet    A/P: 31 yo  @ 18w5d presents for NEDA visit  1) PNC: Rh positive, Kimmie negative, Rubella immune, Infectious labs wnl  2) Genetic screening: Normal NT, First trimester screen increased the risk for Down syndrome from her age related risk prior to screening but below screening cutoff. Normal NIPT.  AFP negative.  Has Level II US scheduled 20.  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  Required IVF due to sperm count.  On ASA due to primigravida and IVF pregnancy.  Has fetal echo scheduled 20.  4) Nausea: Improved, occasional Vitamin B6 and Unisom  5) History abnormal pap: 2017, ASCUS with negative high risk HPV. Follow up recommended in 3 years.  Pt [facility] colpos[facility] completed [facility] on [facility] and another pap on 2019. " Pt reported results were normal. Would like to defer pap until postpartum.   6) Constipation: Senna and Miralax prn  7) s/p flu shot  8) At this time there is no reason for concern regarding the reading of marginal insertion of umbilical cord from dating US, this would be early for this finding.  Will reevaluate at her Level II US and if continued concern will complete serial growth scans.  9) RTC in 4 weeks for NEDA visit    Luli Zarate, MS3    Pt seen and discussed with Dr. Valentin.     Physician Attestation     I, Dorothea Valentin, was present with the medical student who participated in the service and in the documentation of the note. I have verified the history and personally performed the physical exam and medical decision making. I agree with the assessment and plan of care as documented in the note.       Dorothea Valentin MD

## 2020-11-09 NOTE — LETTER
"2020       RE: Nova Matthews  [address]  [place] [address]     Dear Colleague,    Thank you for referring your patient, Nova Matthews, to the [facility]OM[facility] [place] at [facility]. Please see a copy of my visit note below.    NEDA Visit 20    S:  Nova is a healthy 30 year old female  at 18w5d gestation via IVF dating. She has been feeling well overall. She denies VB, LOF, SOB, CP, vomiting, contractions, RUQ pain, or pelvic pain. She does note that she has always had headaches on and off, even prior to pregnancy. She gets a headache about once a week or once every other week, and Tylenol usually helps. She does not have any vision changes with these headaches. She does take vitamin B6 and Unisom for nausea and she thinks this has been very helpful for her symptoms. She has tried not taking them a few times, but then she thinks she does not feel well the next day. She has small, random cramping in her abdomen that only last a few seconds. These cramps are sporadic.     Nova also has a history of constipation that has been worse with IVF treatments and pregnancy. She takes Senokot regularly and takes Miralax when she feels she needs it.    She had questions about a previous provider telling her she had \"marginal insertion of umbilical cord\" and wanted to learn more about this and if she should be worried.     O:  See OB Flowsheet    A/P: 31 yo  @ 18w5d presents for NEDA visit  1) PNC: Rh positive, Kimmie negative, Rubella immune, Infectious labs wnl  2) Genetic screening: Normal NT, First trimester screen increased the risk for Down syndrome from her age related risk prior to screening but below screening cutoff. Normal NIPT.  AFP negative.  Has Level II US scheduled 20.  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  Required IVF due to sperm count.  On ASA due to " primigravida and IVF pregnancy.  Has fetal echo scheduled 12/4/20.  4) Nausea: Improved, occasional Vitamin B6 and Unisom  5) History abnormal pap: 4/26/2017, ASCUS with negative high risk HPV. Follow up recommended in 3 years.  Pt had [facility] 6[facility] and another pap on 7/8/2019. Pt reported results were normal. Would like to defer pap until postpartum.   6) Constipation: Senna and Miralax prn  7) s/p flu shot  8) At this time there is no reason for concern regarding the reading of marginal insertion of umbilical cord from dating US, this would be early for this finding.  Will reevaluate at her Level II US and if continued concern will complete serial growth scans.  9) RTC in 4 weeks for NEDA visit    Luli Zarate MS3    Pt seen and discussed with Dr. Valentin.     Physician Attestation     I, Dorothea Valentin, was present with the medical student who participated in the service and in the documentation of the note. I have verified the history and personally performed the physical exam and medical decision making. I agree with the assessment and plan of care as documented in the note.       Dorothea Valentin MD

## 2020-11-09 NOTE — NURSING NOTE
Chief Complaint   Patient presents with     Prenatal Care     18w5d       See QUANG Gonzalez 11/9/2020

## 2020-11-10 ASSESSMENT — ANXIETY QUESTIONNAIRES: GAD7 TOTAL SCORE: 0

## 2020-11-13 ENCOUNTER — OFFICE VISIT (OUTPATIENT)
Dept: MATERNAL FETAL MEDICINE | Facility: CLINIC | Age: 30
End: 2020-11-13
Attending: ADVANCED PRACTICE MIDWIFE
Payer: COMMERCIAL

## 2020-11-13 ENCOUNTER — HOSPITAL ENCOUNTER (OUTPATIENT)
Dept: ULTRASOUND IMAGING | Facility: CLINIC | Age: 30
End: 2020-11-13
Attending: ADVANCED PRACTICE MIDWIFE
Payer: COMMERCIAL

## 2020-11-13 DIAGNOSIS — O09.812 PREGNANCY RESULTING FROM IN VITRO FERTILIZATION IN SECOND TRIMESTER: Primary | ICD-10-CM

## 2020-11-13 DIAGNOSIS — O26.90 PREGNANCY RELATED CONDITION, ANTEPARTUM: ICD-10-CM

## 2020-11-13 PROCEDURE — 76811 OB US DETAILED SNGL FETUS: CPT | Mod: 26 | Performed by: OBSTETRICS & GYNECOLOGY

## 2020-11-13 PROCEDURE — 76811 OB US DETAILED SNGL FETUS: CPT

## 2020-11-13 NOTE — PROGRESS NOTES
"Please see \"Imaging\" tab under \"Chart Review\" for details of today's US at the [facility].    Luigi Garcia MD  Maternal-Fetal Medicine      "

## 2020-11-15 PROBLEM — O43.122 VELAMENTOUS INSERTION OF UMBILICAL CORD IN SECOND TRIMESTER: Status: ACTIVE | Noted: 2020-09-28

## 2020-12-04 ENCOUNTER — HOSPITAL ENCOUNTER (OUTPATIENT)
Dept: ULTRASOUND IMAGING | Facility: CLINIC | Age: 30
End: 2020-12-04
Attending: OBSTETRICS & GYNECOLOGY
Payer: COMMERCIAL

## 2020-12-04 ENCOUNTER — OFFICE VISIT (OUTPATIENT)
Dept: MATERNAL FETAL MEDICINE | Facility: CLINIC | Age: 30
End: 2020-12-04
Attending: OBSTETRICS & GYNECOLOGY
Payer: COMMERCIAL

## 2020-12-04 DIAGNOSIS — O09.812 PREGNANCY RESULTING FROM IN VITRO FERTILIZATION IN SECOND TRIMESTER: Primary | ICD-10-CM

## 2020-12-04 DIAGNOSIS — O09.811 PREGNANCY RESULTING FROM IN VITRO FERTILIZATION IN FIRST TRIMESTER: ICD-10-CM

## 2020-12-04 PROCEDURE — 76825 ECHO EXAM OF FETAL HEART: CPT

## 2020-12-04 PROCEDURE — 76827 ECHO EXAM OF FETAL HEART: CPT | Mod: 26 | Performed by: OBSTETRICS & GYNECOLOGY

## 2020-12-04 PROCEDURE — 93325 DOPPLER ECHO COLOR FLOW MAPG: CPT | Mod: 26 | Performed by: OBSTETRICS & GYNECOLOGY

## 2020-12-04 PROCEDURE — 76825 ECHO EXAM OF FETAL HEART: CPT | Mod: 26 | Performed by: OBSTETRICS & GYNECOLOGY

## 2020-12-04 NOTE — PROGRESS NOTES
"Please see \"Imaging\" tab under \"Chart Review\" for details of today's US at the [facility].    Luigi Garcia MD  Maternal-Fetal Medicine      "

## 2020-12-15 DIAGNOSIS — O43.122 VELAMENTOUS INSERTION OF UMBILICAL CORD IN SECOND TRIMESTER: Primary | ICD-10-CM

## 2020-12-15 NOTE — PROGRESS NOTES
NEDA Visit 20    S: Patient is feeling overall well. Denies recent headaches, vision changes, contractions, LOF, vaginal bleeding, or vaginal discharge. No RUQ or epigastric pain. Feels that her nausea has improved since her last visit and reports adequate control of constipation with Senna, Miralax PRN. Continues to feel fetal movements. Has questions about velamentous cord and what delivery might look like, risk of vasa previa. Otherwise doing well.    O:  See OB Flowsheet    FHR: 140 bpm  Fundal height: 24 cm    A/P: 29 yo  @ 24w0d presents for NEDA visit  1) PNC: Rh positive, Kimmie negative, Rubella immune, Infectious labs wnl  2) Genetic screening: Normal NT, First trimester screen increased the risk for Down syndrome from her age related risk prior to screening but below screening cutoff. Normal NIPT.  AFP negative.  Level II US normal, having a boy.  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  Required IVF due to sperm count. On ASA due to primigravida and IVF pregnancy. Fetal echo  normal.  4) Velamentous cord insertion: Plan to follow with growth scans every 4 weeks, EFW today 57%ile. Discussed with patient that biggest risk associated with velamentous cord is growth restriction, hence q4w growth scans. Ensured her there is low concern for vasa previa given placental location but that we will continue to monitor scans and alter plans for delivery as needed.   5) History abnormal pap: 2017, ASCUS with negative high risk HPV. Follow up recommended in 3 years.  Pt had colposcopy completed at [facility] on 2019 and another pap on 2019. Pt reported results were normal. Would like to defer pap until postpartum.   6) Nausea: Improved, occasional Vitamin B6 and Unisom  7) Constipation: Senna and Miralax prn  8) s/p flu shot  9) RTC in 4 weeks for Growth US and EOB visit    Kavitha Flynn MS3    Pt seen and discussed with Dr. Valentin.      Physician Attestation     I, Dorothea Valentin, was present with the medical student who participated in the service and in the documentation of the note. I have verified the history and personally performed the physical exam and medical decision making. I agree with the assessment and plan of care as documented in the note.       Dorothea Valentin MD

## 2020-12-16 ENCOUNTER — ANCILLARY PROCEDURE (OUTPATIENT)
Dept: ULTRASOUND IMAGING | Facility: CLINIC | Age: 30
End: 2020-12-16
Attending: OBSTETRICS & GYNECOLOGY
Payer: COMMERCIAL

## 2020-12-16 ENCOUNTER — OFFICE VISIT (OUTPATIENT)
Dept: OBGYN | Facility: CLINIC | Age: 30
End: 2020-12-16
Attending: OBSTETRICS & GYNECOLOGY
Payer: COMMERCIAL

## 2020-12-16 VITALS
SYSTOLIC BLOOD PRESSURE: 101 MMHG | HEIGHT: 68 IN | DIASTOLIC BLOOD PRESSURE: 67 MMHG | WEIGHT: 161 LBS | HEART RATE: 73 BPM | BODY MASS INDEX: 24.4 KG/M2

## 2020-12-16 DIAGNOSIS — O09.90 HIGH-RISK PREGNANCY, UNSPECIFIED TRIMESTER: Primary | ICD-10-CM

## 2020-12-16 DIAGNOSIS — O43.122 VELAMENTOUS INSERTION OF UMBILICAL CORD IN SECOND TRIMESTER: ICD-10-CM

## 2020-12-16 PROCEDURE — 76805 OB US >/= 14 WKS SNGL FETUS: CPT

## 2020-12-16 PROCEDURE — G0463 HOSPITAL OUTPT CLINIC VISIT: HCPCS | Mod: 25

## 2020-12-16 PROCEDURE — 99207 PR PRENATAL VISIT: CPT | Performed by: OBSTETRICS & GYNECOLOGY

## 2020-12-16 PROCEDURE — 76805 OB US >/= 14 WKS SNGL FETUS: CPT | Mod: 26 | Performed by: OBSTETRICS & GYNECOLOGY

## 2020-12-16 PROCEDURE — 76816 OB US FOLLOW-UP PER FETUS: CPT | Mod: 26 | Performed by: OBSTETRICS & GYNECOLOGY

## 2020-12-16 ASSESSMENT — MIFFLIN-ST. JEOR: SCORE: 1493.79

## 2020-12-16 NOTE — NURSING NOTE
Chief Complaint   Patient presents with     Prenatal Care     24w0d       See QUANG Gonzalez 12/16/2020

## 2020-12-16 NOTE — LETTER
2020       RE: Nova Matthews  [address]  [place] [address]     Dear Colleague,    Thank you for referring your patient, Nova Matthews, to the [facility] [place] at [facility]. Please see a copy of my visit note below.    NEDA Visit 20    S: Patient is feeling overall well. Denies recent headaches, vision changes, contractions, LOF, vaginal bleeding, or vaginal discharge. No RUQ or epigastric pain. Feels that her nausea has improved since her last visit and reports adequate control of constipation with Senna, Miralax PRN. Continues to feel fetal movements. Has questions about velamentous cord and what delivery might look like, risk of vasa previa. Otherwise doing well.    O:  See OB Flowsheet    FHR: 140 bpm  Fundal height: 24 cm    A/P: 31 yo  @ 24w0d presents for NEDA visit  1) PNC: Rh positive, Kimmie negative, Rubella immune, Infectious labs wnl  2) Genetic screening: Normal NT, First trimester screen increased the risk for Down syndrome from her age related risk prior to screening but below screening cutoff. Normal NIPT.  AFP negative.  Level II US normal, having a boy.  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  Required IVF due to sperm count. On ASA due to primigravida and IVF pregnancy. Fetal echo / normal.  4) Velamentous cord insertion: Plan to follow with growth scans every 4 weeks, EFW today 57%ile. Discussed with patient that biggest risk associated with velamentous cord is growth restriction, hence q4w growth scans. Ensured her there is low concern for vasa previa given placental location but that we will continue to monitor scans and alter plans for delivery as needed.   5) History abnormal pap: 2017, ASCUS with negative high risk HPV. Follow up recommended in 3 years.  Pt had colposcopy completed at [facility] on 2019 and another pap on 2019. Pt  reported results were normal. Would like to defer pap until postpartum.   6) Nausea: Improved, occasional Vitamin B6 and Unisom  7) Constipation: Senna and Miralax prn  8) s/p flu shot  9) RTC in 4 weeks for Growth US and EOB visit    Kavitha Flynn, MS3    Pt seen and discussed with Dr. Valentin.     Physician Attestation     I, Dorothea Valentin, was present with the medical student who participated in the service and in the documentation of the note. I have verified the history and personally performed the physical exam and medical decision making. I agree with the assessment and plan of care as documented in the note.       Dorothea Valentin MD

## 2020-12-27 ENCOUNTER — HEALTH MAINTENANCE LETTER (OUTPATIENT)
Age: 30
End: 2020-12-27

## 2021-01-12 NOTE — PROGRESS NOTES
"EOB Visit 2021    S: Nova is a 30 y.o.  at 28 weeks gestation via IVF dating who presents for EOB. No significant concerns today. Mood and energy level have been good. 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 States it was generalized to the front of her head, was not relieved with Tylenol but did go away every night, and overall was similar to past headaches (which she used to get once per month, possibly associated with menses, improved after she stopped OCP). Denies SOB, CP, palpitations, leg swelling, abdominal pain, nausea or vomiting. No pelvic pain, contractions, or vaginal bleeding. She did notice a small amount of \"wetness\" in her shorts after biking yesterday, but did not have pain and has not had LOF since. She has been feeling normal fetal movements.     Education completed today includes breast feeding, [facility] hand out, contraception, when to present to birthplace, GBS, getting enough iron and labor induction.  Birth preferences reviewed: Medicated (likely epidural)   Labor support:    [place] Feeding plans :   Contraception planned:  unsure  The following labs were ordered today:       GCT, CBC w platelets, Vitamin D and Anti-treponema, pre-eclampsia screening labs (platelets, AST/ALT, creatinine, urine protein/creatinine)   Water birth consent form was not given.  Blood type:   ABO   Date Value Ref Range Status   2020 O  Final     RH(D)   Date Value Ref Range Status   2020 Pos  Final     Antibody Screen   Date Value Ref Range Status   2020 Neg  Final   Rhogam was not given.  Tdap not given today as patient received Dose#1 of COVID vaccine today    O:  See OB Flowsheet    A/P: 31 yo  @ 28w0d presents for NEDA visit  1) PNC: Rh positive, Kimmie negative, Rubella immune, Infectious labs wnl, EOB labs ordered today  2) Genetic screening: " Normal NT, First trimester screen increased the risk for Down syndrome from her age related risk prior to screening but below screening cutoff. Normal NIPT.  AFP negative.  Level II US normal, having a boy.  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  Required IVF due to sperm count. On ASA due to primigravida and IVF pregnancy, repeat 3rd trimester levels today. Fetal echo 12/4 normal.  4) Velamentous cord insertion: Plan to follow with growth scans every 4 weeks, EFW today 63%ile.   5) History abnormal pap: 4/26/2017, ASCUS with negative high risk HPV. Follow up recommended in 3 years.  Pt had [facility] on 6[facility]25/2019 and another pap on 7/8/2019. Pt reported results were normal. Would like to defer pap until postpartum.   6) Delivery planning: epidural/breastfeed/unsure on contraception   7) Nausea: Improved, occasional Vitamin B6 and Unisom  8) Constipation: Senna and Miralax prn  9) s/p flu shot, received first dose of COVID vaccine today, will plan on Tdap at visit >2 weeks after second dose COVID vaccine   10) Refilled folic acid prescription per pt request   11) RTC in 2 weeks for NEDA visit, 4 weeks for Growth US and NEDA visit    The patient was seen and discussed with Dr. Homero Mendoza, MS3    Pt seen and discussed with Dr. Valentin.     Physician Attestation     I, Dorothea Valentin, was present with the medical student who participated in the service and in the documentation of the note. I have verified the history and personally performed the physical exam and medical decision making. I agree with the assessment and plan of care as documented in the note.       Dorothea Valentin MD

## 2021-01-13 ENCOUNTER — ANCILLARY PROCEDURE (OUTPATIENT)
Dept: ULTRASOUND IMAGING | Facility: CLINIC | Age: 31
End: 2021-01-13
Attending: OBSTETRICS & GYNECOLOGY
Payer: COMMERCIAL

## 2021-01-13 ENCOUNTER — OFFICE VISIT (OUTPATIENT)
Dept: OBGYN | Facility: CLINIC | Age: 31
End: 2021-01-13
Attending: OBSTETRICS & GYNECOLOGY
Payer: COMMERCIAL

## 2021-01-13 VITALS
BODY MASS INDEX: 24.48 KG/M2 | HEIGHT: 68 IN | HEART RATE: 83 BPM | SYSTOLIC BLOOD PRESSURE: 109 MMHG | WEIGHT: 161.5 LBS | DIASTOLIC BLOOD PRESSURE: 73 MMHG

## 2021-01-13 DIAGNOSIS — O09.93 SUPERVISION OF HIGH RISK PREGNANCY IN THIRD TRIMESTER: Primary | ICD-10-CM

## 2021-01-13 DIAGNOSIS — O43.122 VELAMENTOUS INSERTION OF UMBILICAL CORD IN SECOND TRIMESTER: ICD-10-CM

## 2021-01-13 LAB
ALT SERPL W P-5'-P-CCNC: 27 U/L (ref 0–50)
AST SERPL W P-5'-P-CCNC: 23 U/L (ref 0–45)
CREAT SERPL-MCNC: 0.47 MG/DL (ref 0.52–1.04)
CREAT UR-MCNC: 59 MG/DL
DEPRECATED CALCIDIOL+CALCIFEROL SERPL-MC: 53 UG/L (ref 20–75)
ERYTHROCYTE [DISTWIDTH] IN BLOOD BY AUTOMATED COUNT: 12.3 % (ref 10–15)
GFR SERPL CREATININE-BSD FRML MDRD: >90 ML/MIN/{1.73_M2}
GLUCOSE 1H P 50 G GLC PO SERPL-MCNC: 92 MG/DL (ref 60–129)
HCT VFR BLD AUTO: 32.9 % (ref 35–47)
HGB BLD-MCNC: 11.1 G/DL (ref 11.7–15.7)
MCH RBC QN AUTO: 29.8 PG (ref 26.5–33)
MCHC RBC AUTO-ENTMCNC: 33.7 G/DL (ref 31.5–36.5)
MCV RBC AUTO: 88 FL (ref 78–100)
PLATELET # BLD AUTO: 267 10E9/L (ref 150–450)
PROT UR-MCNC: 0.07 G/L
PROT/CREAT 24H UR: 0.12 G/G CR (ref 0–0.2)
RBC # BLD AUTO: 3.72 10E12/L (ref 3.8–5.2)
T PALLIDUM AB SER QL: NONREACTIVE
WBC # BLD AUTO: 11.2 10E9/L (ref 4–11)

## 2021-01-13 PROCEDURE — 82565 ASSAY OF CREATININE: CPT | Performed by: OBSTETRICS & GYNECOLOGY

## 2021-01-13 PROCEDURE — 76816 OB US FOLLOW-UP PER FETUS: CPT | Mod: 26 | Performed by: OBSTETRICS & GYNECOLOGY

## 2021-01-13 PROCEDURE — 76805 OB US >/= 14 WKS SNGL FETUS: CPT

## 2021-01-13 PROCEDURE — G0463 HOSPITAL OUTPT CLINIC VISIT: HCPCS | Mod: 25

## 2021-01-13 PROCEDURE — 84460 ALANINE AMINO (ALT) (SGPT): CPT | Performed by: OBSTETRICS & GYNECOLOGY

## 2021-01-13 PROCEDURE — 82950 GLUCOSE TEST: CPT | Performed by: OBSTETRICS & GYNECOLOGY

## 2021-01-13 PROCEDURE — 99207 PR PRENATAL VISIT: CPT | Performed by: OBSTETRICS & GYNECOLOGY

## 2021-01-13 PROCEDURE — 36415 COLL VENOUS BLD VENIPUNCTURE: CPT | Performed by: OBSTETRICS & GYNECOLOGY

## 2021-01-13 PROCEDURE — 84156 ASSAY OF PROTEIN URINE: CPT | Performed by: OBSTETRICS & GYNECOLOGY

## 2021-01-13 PROCEDURE — 86780 TREPONEMA PALLIDUM: CPT | Performed by: OBSTETRICS & GYNECOLOGY

## 2021-01-13 PROCEDURE — 84450 TRANSFERASE (AST) (SGOT): CPT | Performed by: OBSTETRICS & GYNECOLOGY

## 2021-01-13 PROCEDURE — 82306 VITAMIN D 25 HYDROXY: CPT | Performed by: OBSTETRICS & GYNECOLOGY

## 2021-01-13 PROCEDURE — 85027 COMPLETE CBC AUTOMATED: CPT | Performed by: OBSTETRICS & GYNECOLOGY

## 2021-01-13 RX ORDER — CYANOCOBALAMIN (VITAMIN B-12) 500 MCG
1 TABLET ORAL DAILY
Qty: 90 CAPSULE | Refills: 3 | Status: ON HOLD | OUTPATIENT
Start: 2021-01-13 | End: 2021-03-29

## 2021-01-13 ASSESSMENT — MIFFLIN-ST. JEOR: SCORE: 1496.06

## 2021-01-13 ASSESSMENT — ANXIETY QUESTIONNAIRES
2. NOT BEING ABLE TO STOP OR CONTROL WORRYING: NOT AT ALL
3. WORRYING TOO MUCH ABOUT DIFFERENT THINGS: NOT AT ALL
7. FEELING AFRAID AS IF SOMETHING AWFUL MIGHT HAPPEN: NOT AT ALL
GAD7 TOTAL SCORE: 0
1. FEELING NERVOUS, ANXIOUS, OR ON EDGE: NOT AT ALL
6. BECOMING EASILY ANNOYED OR IRRITABLE: NOT AT ALL
5. BEING SO RESTLESS THAT IT IS HARD TO SIT STILL: NOT AT ALL

## 2021-01-13 ASSESSMENT — PATIENT HEALTH QUESTIONNAIRE - PHQ9: 5. POOR APPETITE OR OVEREATING: NOT AT ALL

## 2021-01-13 NOTE — LETTER
"2021       RE: Nova Matthews  [address]  [place] [address]     Dear Colleague,    Thank you for referring your patient, Nova Matthews, to the [facility] [place] at [facility]. Please see a copy of my visit note below.    EOB Visit 2021    S: Nova is a 30 y.o.  at 28 weeks gestation via IVF dating who presents for EOB. No significant concerns today. Mood and energy level have been good. 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 States it was generalized to the front of her head, was not relieved with Tylenol but did go away every night, and overall was similar to past headaches (which she used to get once per month, possibly associated with menses, improved after she stopped OCP). Denies SOB, CP, palpitations, leg swelling, abdominal pain, nausea or vomiting. No pelvic pain, contractions, or vaginal bleeding. She did notice a small amount of \"wetness\" in her shorts after biking yesterday, but did not have pain and has not had LOF since. She has been feeling normal fetal movements.     Education completed today includes breast feeding, [facility] hand out, contraception, when to present to birthplace, GBS, getting enough iron and labor induction.  Birth preferences reviewed: Medicated (likely epidural)   Labor support:     Feeding plans :   Contraception planned:  unsure  The following labs were ordered today:       GCT, CBC w platelets, Vitamin D and Anti-treponema, pre-eclampsia screening labs (platelets, AST/ALT, creatinine, urine protein/creatinine)   Water birth consent form was not given.  Blood type:   ABO   Date Value Ref Range Status   2020 O  Final     RH(D)   Date Value Ref Range Status   2020 Pos  Final     Antibody Screen   Date Value Ref Range Status   2020 Neg  " Final   Rhogam was not given.  Tdap not given today as patient received Dose#1 of COVID vaccine today    O:  See OB Flowsheet    A/P: 29 yo  @ 28w0d presents for NEDA visit  1) PNC: Rh positive, Kimmie negative, Rubella immune, Infectious labs wnl, EOB labs ordered today  2) Genetic screening: Normal NT, First trimester screen increased the risk for Down syndrome from her age related risk prior to screening but below screening cutoff. Normal NIPT.  AFP negative.  Level II US normal, having a boy.  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  Required IVF due to sperm count. On ASA due to primigravida and IVF pregnancy, repeat 3rd trimester levels today. Fetal echo  normal.  4) Velamentous cord insertion: Plan to follow with growth scans every 4 weeks, EFW today 63%ile.   5) History abnormal pap: 2017, ASCUS with negative high risk HPV. Follow up recommended in 3 years.  Pt had colposcopy completed at [facility] on 2019 and another pap on 2019. Pt reported results were normal. Would like to defer pap until postpartum.   6) Delivery planning: epidural/breastfeed/unsure on contraception   7) Nausea: Improved, occasional Vitamin B6 and Unisom  8) Constipation: Senna and Miralax prn  9) s/p flu shot, received first dose of COVID vaccine today, will plan on Tdap at visit >2 weeks after second dose COVID vaccine   10) Refilled folic acid prescription per pt request   11) RTC in 2 weeks for NEDA visit, 4 weeks for Growth US and NEDA visit    The patient was seen and discussed with Dr. Homero Mendoza, MS3    Pt seen and discussed with Dr. Valentin.     Physician Attestation     I, Dorothea Valentin, was present with the medical student who participated in the service and in the documentation of the note. I have verified the history and personally performed the physical exam and medical decision making. I agree with the assessment and plan of care as  documented in the note.       Dorothea Valentin MD

## 2021-01-14 ASSESSMENT — ANXIETY QUESTIONNAIRES: GAD7 TOTAL SCORE: 0

## 2021-01-26 NOTE — PROGRESS NOTES
NEDA Visit 21    S: Nova is a 30 y.o.  at 30w0d who presents for NEDA visit. Overall doing well. She has had worse sleep in the past month due to waking up at night and not being able to go back to sleep (often feeling restless), but no problems falling asleep. Has used Unisom in past after night shifts, and melatonin prior to pregnancy. Also endorses increased stress recently due to studying for [employer], but states mood and energy are good. She denies headaches, vision changes, leg swelling, CP, SOB, and abdominal or pelvic pain. No contractions, vaginal bleeding, or LOF. She is feeling fetal movements.     O:  See OB Flowsheet    A/P: 31 yo  @ 30w0d presents for NEDA visit  1) PNC: Rh positive, Kimmie negative, Rubella immune, Infectious labs wnl, EOB labs ordered today  2) Genetic screening: Normal NT, First trimester screen increased the risk for Down syndrome from her age related risk prior to screening but below screening cutoff. Normal NIPT.  AFP negative.  Level II US normal, having a boy.  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  Required IVF due to sperm count. On ASA due to primigravida and IVF pregnancy. Fetal echo  normal.  4) Velamentous cord insertion: Plan to follow with growth scans every 4 weeks, last 21 EFW 63%ile.   5) History abnormal pap: 2017, ASCUS with negative high risk HPV. Follow up recommended in 3 years.  Pt had colposcopy completed at [facility] [place] [place] on 2019 and another pap on 2019. Pt reported results were normal. Would like to defer pap until postpartum.   6) Delivery planning: epidural/breastfeed/unsure on contraception   7) Nausea: Improved, occasional Vitamin B6 and Unisom  8) Constipation: Senna and Miralax prn  9) Sleep: Recommended melatonin and/or magnesium supplementation. Discussed option of IV iron supplementation to potentially help sleep and mild anemia (hgb 11.1 on ) since she has not  tolerated oral iron well in past.   10) s/p flu shot, received first dose of COVID vaccine 1/13/2021, will plan on Tdap at visit >2 weeks after second dose COVID vaccine   11) RTC in 2 weeks Growth US and NEDA visit    The patient was seen and discussed with Dr. Homero Mendoza, MS3     Pt seen and discussed with Dr. Valentin.     Physician Attestation     I, Dorothea Valentin, was present with the medical student who participated in the service and in the documentation of the note. I have verified the history and personally performed the physical exam and medical decision making. I agree with the assessment and plan of care as documented in the note.       Dorothea Valentin MD

## 2021-01-27 ENCOUNTER — OFFICE VISIT (OUTPATIENT)
Dept: OBGYN | Facility: CLINIC | Age: 31
End: 2021-01-27
Attending: OBSTETRICS & GYNECOLOGY
Payer: COMMERCIAL

## 2021-01-27 VITALS
HEART RATE: 77 BPM | DIASTOLIC BLOOD PRESSURE: 68 MMHG | SYSTOLIC BLOOD PRESSURE: 101 MMHG | BODY MASS INDEX: 24.57 KG/M2 | HEIGHT: 68 IN | WEIGHT: 162.1 LBS

## 2021-01-27 DIAGNOSIS — O09.90 HIGH-RISK PREGNANCY, UNSPECIFIED TRIMESTER: Primary | ICD-10-CM

## 2021-01-27 DIAGNOSIS — O43.122 VELAMENTOUS INSERTION OF UMBILICAL CORD IN SECOND TRIMESTER: ICD-10-CM

## 2021-01-27 PROCEDURE — G0463 HOSPITAL OUTPT CLINIC VISIT: HCPCS

## 2021-01-27 PROCEDURE — 99207 PR PRENATAL VISIT: CPT | Performed by: OBSTETRICS & GYNECOLOGY

## 2021-01-27 ASSESSMENT — MIFFLIN-ST. JEOR: SCORE: 1498.78

## 2021-01-27 NOTE — LETTER
2021       RE: Nova Matthews  [address]  [facility] [address]     Dear Colleague,    Thank you for referring your patient, Nova Matthews, to the [facility] [place] at [facility]. Please see a copy of my visit note below.    NEDA Visit 21    S: Nova is a 30 y.o.  at 30w0d who presents for NEDA visit. Overall doing well. She has had worse sleep in the past month due to waking up at night and not being able to go back to sleep (often feeling restless), but no problems falling asleep. Has used Unisom in past after night shifts, and melatonin prior to pregnancy. Also endorses increased stress recently due to studying for [employer], but states mood and energy are good. She denies headaches, vision changes, leg swelling, CP, SOB, and abdominal or pelvic pain. No contractions, vaginal bleeding, or LOF. She is feeling fetal movements.     O:  See OB Flowsheet    A/P: 29 yo  @ 30w0d presents for NEDA visit  1) PNC: Rh positive, Kimmie negative, Rubella immune, Infectious labs wnl, EOB labs ordered today  2) Genetic screening: Normal NT, First trimester screen increased the risk for Down syndrome from her age related risk prior to screening but below screening cutoff. Normal NIPT.  AFP negative.  Level II US normal, having a boy.  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  Required IVF due to sperm count. On ASA due to primigravida and IVF pregnancy. Fetal echo  normal.  4) Velamentous cord insertion: Plan to follow with growth scans every 4 weeks, last 21 EFW 63%ile.   5) History abnormal pap: 2017, ASCUS with negative high risk HPV. Follow up recommended in 3 years.  [place] [facility] [place] pap on 2019. Pt reported results were normal. Would like to defer pap until postpartum.   6) Delivery planning: epidural/breastfeed/unsure on  contraception   7) Nausea: Improved, occasional Vitamin B6 and Unisom  8) Constipation: Senna and Miralax prn  9) Sleep: Recommended melatonin and/or magnesium supplementation. Discussed option of IV iron supplementation to potentially help sleep and mild anemia (hgb 11.1 on 1/13) since she has not tolerated oral iron well in past.   10) s/p flu shot, received first dose of COVID vaccine 1/13/2021, will plan on Tdap at visit >2 weeks after second dose COVID vaccine   11) RTC in 2 weeks Growth US and NEDA visit    The patient was seen and discussed with Dr. Homero Mendoza, MS3     Pt seen and discussed with Dr. Valentin.     Physician Attestation     I, Dorothea Valentin, was present with the medical student who participated in the service and in the documentation of the note. I have verified the history and personally performed the physical exam and medical decision making. I agree with the assessment and plan of care as documented in the note.       Dorothea Valentin MD

## 2021-02-08 NOTE — PROGRESS NOTES
NEDA Visit 2/10/2021    S: Doing well.  Denies ctx, VB or LOF.  + FM.  Denies HA, vision changes, SOB, RUQ pain or increased swelling.  Sleep continues to be variable, takes melatonin or Unisom here and there.  Bowels continue to be difficult to totally control, but uses Senna and Miralax based on symptoms and seems to be ok.  Having increased GERD symptoms, using Tums and feels this is sufficient for now.    O:  See OB Flowsheet    A/P: 29 yo  @ 32w0d presents for NEDA visit  1) PNC: Rh positive, Kimmie negative, Rubella immune, Infectious labs wnl, GCT 92.  2) Genetic screening: Normal NT, First trimester screen increased the risk for Down syndrome from her age related risk prior to screening but below screening cutoff. Normal NIPT.  AFP negative.  Level II US normal, having a boy.  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  Required IVF due to sperm count. On ASA due to primigravida and IVF pregnancy. Fetal echo  normal.  4) Velamentous cord insertion: Plan to follow with growth scans every 4 weeks, last today EFW 61%ile.   5) History abnormal pap: 2017, ASCUS with negative high risk HPV. Follow up recommended in 3 years.  [place] [facility] [place] another pap on 2019. Pt reported results were normal. Would like to defer pap until postpartum.   6) Delivery planning: epidural/breastfeed/unsure on contraception   7) Nausea: Improved, occasional Vitamin B6 and Unisom  8) Constipation: Senna and Miralax prn  9) Sleep: Mealtonin and Unison prn  10) GERD: Using tums prn, if worsens will initiate prilosec  11) s/p flu shot, received first dose of COVID vaccine 2021, second dose on 2021, will plan on Tdap at visit >2 weeks after second dose COVID vaccine  12) RTC in 2 weeks for NEDA visit, 4 weeks for Growth US and NEDA visit    Dorothea Valentin MD

## 2021-02-10 ENCOUNTER — ANCILLARY PROCEDURE (OUTPATIENT)
Dept: ULTRASOUND IMAGING | Facility: CLINIC | Age: 31
End: 2021-02-10
Attending: OBSTETRICS & GYNECOLOGY
Payer: COMMERCIAL

## 2021-02-10 ENCOUNTER — OFFICE VISIT (OUTPATIENT)
Dept: OBGYN | Facility: CLINIC | Age: 31
End: 2021-02-10
Attending: OBSTETRICS & GYNECOLOGY
Payer: COMMERCIAL

## 2021-02-10 VITALS
DIASTOLIC BLOOD PRESSURE: 71 MMHG | SYSTOLIC BLOOD PRESSURE: 109 MMHG | HEART RATE: 72 BPM | BODY MASS INDEX: 25.31 KG/M2 | WEIGHT: 167 LBS | HEIGHT: 68 IN

## 2021-02-10 DIAGNOSIS — O43.122 VELAMENTOUS INSERTION OF UMBILICAL CORD IN SECOND TRIMESTER: ICD-10-CM

## 2021-02-10 DIAGNOSIS — Z78.9 CONCEIVED BY IN VITRO FERTILIZATION: ICD-10-CM

## 2021-02-10 DIAGNOSIS — O09.90 HIGH-RISK PREGNANCY, UNSPECIFIED TRIMESTER: Primary | ICD-10-CM

## 2021-02-10 PROCEDURE — 76805 OB US >/= 14 WKS SNGL FETUS: CPT

## 2021-02-10 PROCEDURE — 76816 OB US FOLLOW-UP PER FETUS: CPT | Mod: 26 | Performed by: OBSTETRICS & GYNECOLOGY

## 2021-02-10 PROCEDURE — 99207 PR PRENATAL VISIT: CPT | Performed by: OBSTETRICS & GYNECOLOGY

## 2021-02-10 PROCEDURE — G0463 HOSPITAL OUTPT CLINIC VISIT: HCPCS

## 2021-02-10 ASSESSMENT — MIFFLIN-ST. JEOR: SCORE: 1521.01

## 2021-02-10 NOTE — LETTER
2/10/2021       RE: Nova Matthews  [address]  [facility] [address]     Dear Colleague,    Thank you for referring your patient, Nova Matthews, to the [facility] [place] at [facility]. Please see a copy of my visit note below.    NEDA Visit 2/10/2021    S: Doing well.  Denies ctx, VB or LOF.  + FM.  Denies HA, vision changes, SOB, RUQ pain or increased swelling.  Sleep continues to be variable, takes melatonin or Unisom here and there.  Bowels continue to be difficult to totally control, but uses Senna and Miralax based on symptoms and seems to be ok.  Having increased GERD symptoms, using Tums and feels this is sufficient for now.    O:  See OB Flowsheet    A/P: 29 yo  @ 32w0d presents for NEDA visit  1) PNC: Rh positive, Kimmie negative, Rubella immune, Infectious labs wnl, GCT 92.  2) Genetic screening: Normal NT, First trimester screen increased the risk for Down syndrome from her age related risk prior to screening but below screening cutoff. Normal NIPT.  AFP negative.  Level II US normal, having a boy.  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  Required IVF due to sperm count. On ASA due to primigravida and IVF pregnancy. Fetal echo 12/ normal.  4) Velamentous cord insertion: Plan to follow with growth scans every 4 weeks, last today EFW 61%ile.   5) History abnormal pap: 2017, ASCUS with negative high risk HPV. Follow up recommended in 3 years.  Pt had colposcopy completed at [facility] on 2019 and another pap on 2019. Pt reported results were normal. Would like to defer pap until postpartum.   6) Delivery planning: epidural/breastfeed/unsure on contraception   7) Nausea: Improved, occasional Vitamin B6 and Unisom  8) Constipation: Senna and Miralax prn  9) Sleep: Mealtonin and Unison prn  10) GERD: Using tums prn, if worsens will initiate prilosec  11) s/p flu  shot, received first dose of COVID vaccine 1/13/2021, second dose on 2/1/2021, will plan on Tdap at visit >2 weeks after second dose COVID vaccine  12) RTC in 2 weeks for NEDA visit, 4 weeks for Growth US and NEDA visit    Dorothea Valentin MD

## 2021-02-23 NOTE — PROGRESS NOTES
NEDA Visit 2021    S: Doing well.  No real concerns today.  Occasional pressure in pelvis but nothing regular or concerning.  Denies ctx, VB or LOF.  + FM.  Denies HA, vision changes, SOB, RUQ pain or increased swelling.  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  Requesting breast pump Rx today.  Also questions about iron supplementation and if needs IV iron, does not want constipation to worsen so wants to avoid oral iron.  GERD stable.      O:  See OB Flowsheet    A/P: 29 yo  @ 34w0d presents for NEDA visit  1) PNC: Rh positive, Kimmie negative, Rubella immune, Infectious labs wnl, GCT 92.  2) Genetic screening: Normal NT, First trimester screen increased the risk for Down syndrome from her age related risk prior to screening but below screening cutoff. Normal NIPT.  AFP negative.  Level II US normal, having a boy.  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  Required IVF due to sperm count. On ASA due to primigravida and IVF pregnancy. Fetal echo  normal.  4) Velamentous cord insertion: Plan to follow with growth scans every 4 weeks, last 2/10/2021 EFW 61%ile.   5) History abnormal pap: 2017, ASCUS with negative high risk HPV. Follow up recommended in 3 years.  Pt had colposcopy completed at [facility] on 2019 and another pap on 2019. Pt reported results were normal. Would like to defer pap until postpartum.   6) Delivery planning: epidural/breastfeed/unsure on contraception   7) Nausea: Improved, occasional Vitamin B6 and Unisom  8) Constipation: Senna and Miralax prn  9) Sleep: Melatonin and Unisom prn  10) GERD: Using tums prn, if worsens will initiate prilosec  11) Anemia associated to pregnancy: Will recheck CBC/Plts today, depending on results with discuss moving forward with IV iron supplementation  12) s/p flu shot, received first dose of COVID vaccine 2021, second dose on 2021, Tdap  today  13) RTC in 2 weeks for Growth US and NEDA visit, GBS at that time    Dorothea Valentin MD

## 2021-02-24 ENCOUNTER — OFFICE VISIT (OUTPATIENT)
Dept: OBGYN | Facility: CLINIC | Age: 31
End: 2021-02-24
Attending: OBSTETRICS & GYNECOLOGY
Payer: COMMERCIAL

## 2021-02-24 VITALS
BODY MASS INDEX: 25.88 KG/M2 | SYSTOLIC BLOOD PRESSURE: 113 MMHG | DIASTOLIC BLOOD PRESSURE: 76 MMHG | HEART RATE: 75 BPM | WEIGHT: 170.8 LBS | HEIGHT: 68 IN

## 2021-02-24 DIAGNOSIS — O43.122 VELAMENTOUS INSERTION OF UMBILICAL CORD IN SECOND TRIMESTER: ICD-10-CM

## 2021-02-24 DIAGNOSIS — O92.70 LACTATION DISORDER: ICD-10-CM

## 2021-02-24 DIAGNOSIS — Z34.03 SUPERVISION OF NORMAL FIRST PREGNANCY IN THIRD TRIMESTER: Primary | ICD-10-CM

## 2021-02-24 LAB
ERYTHROCYTE [DISTWIDTH] IN BLOOD BY AUTOMATED COUNT: 11.9 % (ref 10–15)
HCT VFR BLD AUTO: 34.3 % (ref 35–47)
HGB BLD-MCNC: 11.1 G/DL (ref 11.7–15.7)
MCH RBC QN AUTO: 27.4 PG (ref 26.5–33)
MCHC RBC AUTO-ENTMCNC: 32.4 G/DL (ref 31.5–36.5)
MCV RBC AUTO: 85 FL (ref 78–100)
PLATELET # BLD AUTO: 241 10E9/L (ref 150–450)
RBC # BLD AUTO: 4.05 10E12/L (ref 3.8–5.2)
WBC # BLD AUTO: 9.9 10E9/L (ref 4–11)

## 2021-02-24 PROCEDURE — 90715 TDAP VACCINE 7 YRS/> IM: CPT

## 2021-02-24 PROCEDURE — 85027 COMPLETE CBC AUTOMATED: CPT | Performed by: OBSTETRICS & GYNECOLOGY

## 2021-02-24 PROCEDURE — 250N000011 HC RX IP 250 OP 636

## 2021-02-24 PROCEDURE — G0463 HOSPITAL OUTPT CLINIC VISIT: HCPCS | Mod: 25

## 2021-02-24 PROCEDURE — 36415 COLL VENOUS BLD VENIPUNCTURE: CPT | Performed by: OBSTETRICS & GYNECOLOGY

## 2021-02-24 PROCEDURE — 99207 PR PRENATAL VISIT: CPT | Performed by: OBSTETRICS & GYNECOLOGY

## 2021-02-24 PROCEDURE — 90471 IMMUNIZATION ADMIN: CPT

## 2021-02-24 RX ORDER — BREAST PUMP
1 EACH MISCELLANEOUS ONCE
Qty: 1 EACH | Refills: 0 | Status: SHIPPED | OUTPATIENT
Start: 2021-02-24 | End: 2021-02-24

## 2021-02-24 RX ORDER — FOLIC ACID 1 MG/1
1 TABLET ORAL DAILY
Qty: 90 TABLET | Refills: 1 | Status: SHIPPED | OUTPATIENT
Start: 2021-02-24 | End: 2021-05-17

## 2021-02-24 ASSESSMENT — MIFFLIN-ST. JEOR: SCORE: 1538.24

## 2021-02-24 NOTE — NURSING NOTE
Chief Complaint   Patient presents with     Prenatal Care     34w0d       See QUANG Gonzalez 2/24/2021

## 2021-02-24 NOTE — LETTER
2021       RE: Nova Matthews  [address]  [facility] [address]     Dear Colleague,    Thank you for referring your patient, Nova Matthews, to the [facility] [place] at [facility]. Please see a copy of my visit note below.    NEDA Visit 2021    S: Doing well.  No real concerns today.  Occasional pressure in pelvis but nothing regular or concerning.  Denies ctx, VB or LOF.  + FM.  Denies HA, vision changes, SOB, RUQ pain or increased swelling.  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  Requesting breast pump Rx today.  Also questions about iron supplementation and if needs IV iron, does not want constipation to worsen so wants to avoid oral iron.  GERD stable.      O:  See OB Flowsheet    A/P: 31 yo  @ 34w0d presents for NEDA visit  1) PNC: Rh positive, Kimmie negative, Rubella immune, Infectious labs wnl, GCT 92.  2) Genetic screening: Normal NT, First trimester screen increased the risk for Down syndrome from her age related risk prior to screening but below screening cutoff. Normal NIPT.  AFP negative.  Level II US normal, having a boy.  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  Required IVF due to sperm count. On ASA due to primigravida and IVF pregnancy. Fetal echo 12/ normal.  4) Velamentous cord insertion: Plan to follow with growth scans every 4 weeks, last 2/10/2021 EFW 61%ile.   5) History abnormal pap: 2017, ASCUS with negative high risk HPV. Follow up recommended in 3 years.  [place] [facility] [place] on 2019. Pt reported results were normal. Would like to defer pap until postpartum.   6) Delivery planning: epidural/breastfeed/unsure on contraception   7) Nausea: Improved, occasional Vitamin B6 and Unisom  8) Constipation: Senna and Miralax prn  9) Sleep: Melatonin and  Unisom prn  10) GERD: Using tums prn, if worsens will initiate prilosec  11) Anemia associated to pregnancy: Will recheck CBC/Plts today, depending on results with discuss moving forward with IV iron supplementation  12) s/p flu shot, received first dose of COVID vaccine 1/13/2021, second dose on 2/1/2021, Tdap today  13) RTC in 2 weeks for Growth US and NEDA visit, GBS at that time    Dorothea Valentin MD

## 2021-03-07 NOTE — PROGRESS NOTES
NEDA Visit 3/8/2021    S: Nova is a 30 y.o.  at 35w5d gestation who presents for NEDA. Overall doing well. Endorses some pelvic pressure after walks or 12-hour shifts at work. Also has had brief episodes of shooting pelvic pain in the past 2 weeks, though not increasing in frequency or length. No other abdominal or pelvic pain. She had one headache last week that felt like her normal headaches and resolved with Tylenol. No vision changes, chest pain, SOB, or leg swelling. No vaginal bleeding, LOF, contractions. She is feeling fetal movements. She and her  do not have specific questions or concerns today.     O:  See OB Flowsheet    A/P: 31 yo  @ 35w5d presents for NEDA visit  1) PNC: Rh positive, Kimmie negative, Rubella immune, Infectious labs wnl, GCT 92.   2) Genetic screening: Normal NT, First trimester screen increased the risk for Down syndrome from her age related risk prior to screening but below screening cutoff. Normal NIPT.  AFP negative.  Level II US normal, having a boy.  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  Required IVF due to sperm count. On ASA due to primigravida and IVF pregnancy. Fetal echo  normal.  4) Velamentous cord insertion: Plan to follow with growth scans every 4 weeks, today EFW 50%ile.   5) History abnormal pap: 2017, ASCUS with negative high risk HPV. Follow up recommended in 3 years.  Pt [place] [facility] [place] and another pap on 2019. Pt reported results were normal. Would like to defer pap until postpartum.   6) Delivery planning: epidural/breastfeed/unsure on contraception   7) Nausea: Improved, occasional Vitamin B6 and Unisom  8) Constipation: Senna and Miralax prn  9) Sleep: Melatonin and Unisom prn. Discussed that it is OK to use Unisom nightly for sleep.   10) GERD: Using tums prn, if worsens will initiate prilosec  11) s/p flu shot, received first dose of COVID  vaccine 1/13/2021, second dose on 2/1/2021, s/p Tdap 2/24/2021  12) GBS collected today  13) RTC in 2 weeks for NEDA visit    The patient was seen and discussed with Dr. Homero Mendoza, MS3    Pt seen and discussed with Dr. Valentin.     Physician Attestation     I, Dorothea Valentin, was present with the medical student who participated in the service and in the documentation of the note. I have verified the history and personally performed the physical exam and medical decision making. I agree with the assessment and plan of care as documented in the note.       Dorothea Valentin MD

## 2021-03-08 ENCOUNTER — OFFICE VISIT (OUTPATIENT)
Dept: OBGYN | Facility: CLINIC | Age: 31
End: 2021-03-08
Attending: OBSTETRICS & GYNECOLOGY
Payer: COMMERCIAL

## 2021-03-08 ENCOUNTER — ANCILLARY PROCEDURE (OUTPATIENT)
Dept: ULTRASOUND IMAGING | Facility: CLINIC | Age: 31
End: 2021-03-08
Attending: OBSTETRICS & GYNECOLOGY
Payer: COMMERCIAL

## 2021-03-08 VITALS
SYSTOLIC BLOOD PRESSURE: 112 MMHG | HEART RATE: 65 BPM | DIASTOLIC BLOOD PRESSURE: 77 MMHG | BODY MASS INDEX: 26.43 KG/M2 | WEIGHT: 174.4 LBS | HEIGHT: 68 IN

## 2021-03-08 DIAGNOSIS — Z34.03 SUPERVISION OF NORMAL FIRST PREGNANCY IN THIRD TRIMESTER: Primary | ICD-10-CM

## 2021-03-08 DIAGNOSIS — O43.122 VELAMENTOUS INSERTION OF UMBILICAL CORD IN SECOND TRIMESTER: ICD-10-CM

## 2021-03-08 PROCEDURE — 99207 PR PRENATAL VISIT: CPT | Performed by: OBSTETRICS & GYNECOLOGY

## 2021-03-08 PROCEDURE — 76805 OB US >/= 14 WKS SNGL FETUS: CPT

## 2021-03-08 PROCEDURE — 87653 STREP B DNA AMP PROBE: CPT | Performed by: OBSTETRICS & GYNECOLOGY

## 2021-03-08 PROCEDURE — 76816 OB US FOLLOW-UP PER FETUS: CPT | Mod: 26 | Performed by: OBSTETRICS & GYNECOLOGY

## 2021-03-08 ASSESSMENT — MIFFLIN-ST. JEOR: SCORE: 1554.57

## 2021-03-08 NOTE — LETTER
3/8/2021       RE: Nova Matthews  [address]  [facility] [address]     Dear Colleague,    Thank you for referring your patient, Nova Matthews, to the [facility] [place] at [facility]. Please see a copy of my visit note below.    NEDA Visit 3/8/2021    S: Nova is a 30 y.o.  at 35w5d gestation who presents for NEDA. Overall doing well. Endorses some pelvic pressure after walks or 12-hour shifts at work. Also has had brief episodes of shooting pelvic pain in the past 2 weeks, though not increasing in frequency or length. No other abdominal or pelvic pain. She had one headache last week that felt like her normal headaches and resolved with Tylenol. No vision changes, chest pain, SOB, or leg swelling. No vaginal bleeding, LOF, contractions. She is feeling fetal movements. She and her  do not have specific questions or concerns today.     O:  See OB Flowsheet    A/P: 31 yo  @ 35w5d presents for NEDA visit  1) PNC: Rh positive, Kimmie negative, Rubella immune, Infectious labs wnl, GCT 92.   2) Genetic screening: Normal NT, First trimester screen increased the risk for Down syndrome from her age related risk prior to screening but below screening cutoff. Normal NIPT.  AFP negative.  Level II US normal, having a boy.  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  Required IVF due to sperm count. On ASA due to primigravida and IVF pregnancy. Fetal echo  normal.  4) Velamentous cord insertion: Plan to follow with growth scans every 4 weeks, today EFW 50%ile.   5) History abnormal pap: 2017, ASCUS with negative high risk HPV. Follow up recommended in 3 years.  Pt had [place] [facility] [place] and another pap on 2019. Pt reported results were normal. Would like to defer pap until postpartum.   6) Delivery planning: epidural/breastfeed/unsure on  contraception   7) Nausea: Improved, occasional Vitamin B6 and Unisom  8) Constipation: Senna and Miralax prn  9) Sleep: Melatonin and Unisom prn. Discussed that it is OK to use Unisom nightly for sleep.   10) GERD: Using tums prn, if worsens will initiate prilosec  11) s/p flu shot, received first dose of COVID vaccine 1/13/2021, second dose on 2/1/2021, s/p Tdap 2/24/2021  12) GBS collected today  13) RTC in 2 weeks for NEDA visit    The patient was seen and discussed with Dr. Homero Mendoza, MS3    Pt seen and discussed with Dr. Valentin.     Physician Attestation     I, Dorothea Valentin, was present with the medical student who participated in the service and in the documentation of the note. I have verified the history and personally performed the physical exam and medical decision making. I agree with the assessment and plan of care as documented in the note.       Dorothea Valentin MD

## 2021-03-09 LAB
GP B STREP DNA SPEC QL NAA+PROBE: NEGATIVE
SPECIMEN SOURCE: NORMAL

## 2021-03-21 NOTE — PROGRESS NOTES
NEDA Visit 3/22/2021    S: Nova is a 30 y.o.  at 37w5d who presents for NEDA. Overall doing well since last visit. One week ago she noticed small amount of non-painful left ankle swelling compared to right but states it has improved. No other change in leg swelling. Also no headaches, vision changes, shortness of breath, CP, abdominal or pelvic pain. Denies contractions, VB, or LOF. Feeling normal FM. No questions or concerns today.     O:  See OB Flowsheet    A/P: 29 yo  @ 37w5d presents for NEDA visit  1) PNC: Rh positive, Kimmie negative, Rubella immune, Infectious labs wnl, GCT 92.   2) Genetic screening: Normal NT, First trimester screen increased the risk for Down syndrome from her age related risk prior to screening but below screening cutoff. Normal NIPT.  AFP negative.  Level II US normal, having a boy.  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  Required IVF due to sperm count. On ASA due to primigravida and IVF pregnancy. Fetal echo  normal.  4) Velamentous cord insertion: Plan to follow with growth scans every 4 weeks, Last 3/8/2021 EFW 50%ile.   5) History abnormal pap: 2017, ASCUS with negative high risk HPV. Follow up recommended in 3 years.  Pt had colposcopy completed at [facility] on 2019 and another pap on 2019. Pt reported results were normal. Would like to defer pap until postpartum.   6) Delivery planning: epidural/breastfeed/unsure on contraception   7) Nausea: Improved, occasional Vitamin B6 and Unisom  8) Constipation: Senna and Miralax prn  9) Sleep: Melatonin and Unisom prn. Discussed that it is OK to use Unisom nightly for sleep.   10) GERD: Using tums prn, if worsens will initiate prilosec  11) s/p flu shot, received first dose of COVID vaccine 2021, second dose on 2021, s/p Tdap 2021  12) GBS negative  13) RTC in 1 week for NEDA visit, labor precautions discussed    The patient was seen and  discussed with Dr. Homero Mendoza, MS3    Pt seen and discussed with Dr. Valentin.     Physician Attestation     I, Dorothea Valentin, was present with the medical student who participated in the service and in the documentation of the note. I have verified the history and personally performed the physical exam and medical decision making. I agree with the assessment and plan of care as documented in the note.       Dorothea Valentin MD

## 2021-03-22 ENCOUNTER — OFFICE VISIT (OUTPATIENT)
Dept: OBGYN | Facility: CLINIC | Age: 31
End: 2021-03-22
Attending: OBSTETRICS & GYNECOLOGY
Payer: COMMERCIAL

## 2021-03-22 VITALS
SYSTOLIC BLOOD PRESSURE: 122 MMHG | HEIGHT: 68 IN | DIASTOLIC BLOOD PRESSURE: 81 MMHG | HEART RATE: 69 BPM | BODY MASS INDEX: 27.28 KG/M2 | WEIGHT: 180 LBS

## 2021-03-22 DIAGNOSIS — O09.90 HIGH-RISK PREGNANCY, UNSPECIFIED TRIMESTER: Primary | ICD-10-CM

## 2021-03-22 DIAGNOSIS — O43.122 VELAMENTOUS INSERTION OF UMBILICAL CORD IN SECOND TRIMESTER: ICD-10-CM

## 2021-03-22 PROCEDURE — 99207 PR PRENATAL VISIT: CPT | Performed by: OBSTETRICS & GYNECOLOGY

## 2021-03-22 ASSESSMENT — MIFFLIN-ST. JEOR: SCORE: 1579.97

## 2021-03-22 NOTE — LETTER
3/22/2021       RE: Nova Matthews  [address]  [facility] [address]     Dear Colleague,    Thank you for referring your patient, Nova Matthews, to the [facility] [place] at [facility]. Please see a copy of my visit note below.    NEDA Visit 3/22/2021    S: Nova is a 30 y.o.  at 37w5d who presents for NEDA. Overall doing well since last visit. One week ago she noticed small amount of non-painful left ankle swelling compared to right but states it has improved. No other change in leg swelling. Also no headaches, vision changes, shortness of breath, CP, abdominal or pelvic pain. Denies contractions, VB, or LOF. Feeling normal FM. No questions or concerns today.     O:  See OB Flowsheet    A/P: 31 yo  @ 37w5d presents for NEDA visit  1) PNC: Rh positive, Kimmie negative, Rubella immune, Infectious labs wnl, GCT 92.   2) Genetic screening: Normal NT, First trimester screen increased the risk for Down syndrome from her age related risk prior to screening but below screening cutoff. Normal NIPT.  AFP negative.  Level II US normal, having a boy.  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  Required IVF due to sperm count. On ASA due to primigravida and IVF pregnancy. Fetal echo 12/ normal.  4) Velamentous cord insertion: Plan to follow with growth scans every 4 weeks, Last 3/8/2021 EFW 50%ile.   5) History abnormal pap: 2017, ASCUS with negative high risk HPV. Follow up recommended in 3 years.  Pt had [place] completed [place] [facility] [place] and another pap on 2019. Pt reported results were normal. Would like to defer pap until postpartum.   6) Delivery planning: epidural/breastfeed/unsure on contraception   7) Nausea: Improved, occasional Vitamin B6 and Unisom  8) Constipation: Senna and Miralax prn  9) Sleep: Melatonin and Unisom prn. Discussed that it is OK to use Unisom  nightly for sleep.   10) GERD: Using tums prn, if worsens will initiate prilosec  11) s/p flu shot, received first dose of COVID vaccine 1/13/2021, second dose on 2/1/2021, s/p Tdap 2/24/2021  12) GBS negative  13) RTC in 1 week for NEDA visit, labor precautions discussed    The patient was seen and discussed with Dr. Homero Mendoza, MS3    Pt seen and discussed with Dr. Valentin.     Physician Attestation     I, Dorothea Valentin, was present with the medical student who participated in the service and in the documentation of the note. I have verified the history and personally performed the physical exam and medical decision making. I agree with the assessment and plan of care as documented in the note.       Dorothea Valentin MD

## 2021-03-29 ENCOUNTER — TELEPHONE (OUTPATIENT)
Dept: OBGYN | Facility: CLINIC | Age: 31
End: 2021-03-29

## 2021-03-29 ENCOUNTER — HOSPITAL ENCOUNTER (INPATIENT)
Facility: CLINIC | Age: 31
LOS: 2 days | Discharge: HOME HEALTH | End: 2021-03-31
Attending: OBSTETRICS & GYNECOLOGY | Admitting: OBSTETRICS & GYNECOLOGY
Payer: COMMERCIAL

## 2021-03-29 ENCOUNTER — OFFICE VISIT (OUTPATIENT)
Dept: OBGYN | Facility: CLINIC | Age: 31
End: 2021-03-29
Attending: OBSTETRICS & GYNECOLOGY
Payer: COMMERCIAL

## 2021-03-29 ENCOUNTER — HOSPITAL ENCOUNTER (OUTPATIENT)
Facility: CLINIC | Age: 31
End: 2021-03-29
Attending: OBSTETRICS & GYNECOLOGY | Admitting: OBSTETRICS & GYNECOLOGY
Payer: COMMERCIAL

## 2021-03-29 VITALS
HEART RATE: 66 BPM | DIASTOLIC BLOOD PRESSURE: 90 MMHG | BODY MASS INDEX: 27.22 KG/M2 | SYSTOLIC BLOOD PRESSURE: 132 MMHG | WEIGHT: 179.6 LBS | HEIGHT: 68 IN

## 2021-03-29 DIAGNOSIS — R03.0 ELEVATED BLOOD PRESSURE READING WITHOUT DIAGNOSIS OF HYPERTENSION: ICD-10-CM

## 2021-03-29 DIAGNOSIS — O43.123 VELAMENTOUS INSERTION OF UMBILICAL CORD IN THIRD TRIMESTER: Primary | ICD-10-CM

## 2021-03-29 PROBLEM — Z36.89 ENCOUNTER FOR TRIAGE IN PREGNANT PATIENT: Status: ACTIVE | Noted: 2021-03-29

## 2021-03-29 PROBLEM — O60.00 PRETERM LABOR: Status: ACTIVE | Noted: 2021-03-29
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
## 2021-03-29 LAB
ALT SERPL W P-5'-P-CCNC: 19 U/L (ref 0–50)
APTT PPP: 26 SEC (ref 22–37)
APTT PPP: 27 SEC (ref 22–37)
AST SERPL W P-5'-P-CCNC: 19 U/L (ref 0–45)
CREAT SERPL-MCNC: 0.54 MG/DL (ref 0.52–1.04)
CREAT UR-MCNC: 41 MG/DL
ERYTHROCYTE [DISTWIDTH] IN BLOOD BY AUTOMATED COUNT: 14.6 % (ref 10–15)
FIBRINOGEN PPP-MCNC: 431 MG/DL (ref 200–420)
FIBRINOGEN PPP-MCNC: 442 MG/DL (ref 200–420)
GFR SERPL CREATININE-BSD FRML MDRD: >90 ML/MIN/{1.73_M2}
HCT VFR BLD AUTO: 32.3 % (ref 35–47)
HCT VFR BLD AUTO: 32.5 % (ref 35–47)
HCT VFR BLD AUTO: 34.5 % (ref 35–47)
HGB BLD-MCNC: 10.6 G/DL (ref 11.7–15.7)
HGB BLD-MCNC: 10.6 G/DL (ref 11.7–15.7)
HGB BLD-MCNC: 11.3 G/DL (ref 11.7–15.7)
HGB F BLD QL KLEIH BETKE: NORMAL
INR PPP: 0.92 (ref 0.86–1.14)
INR PPP: 0.92 (ref 0.86–1.14)
LABORATORY COMMENT REPORT: NORMAL
MCH RBC QN AUTO: 27 PG (ref 26.5–33)
MCH RBC QN AUTO: 27.2 PG (ref 26.5–33)
MCH RBC QN AUTO: 27.3 PG (ref 26.5–33)
MCHC RBC AUTO-ENTMCNC: 32.6 G/DL (ref 31.5–36.5)
MCHC RBC AUTO-ENTMCNC: 32.8 G/DL (ref 31.5–36.5)
MCHC RBC AUTO-ENTMCNC: 32.8 G/DL (ref 31.5–36.5)
MCV RBC AUTO: 82 FL (ref 78–100)
MCV RBC AUTO: 83 FL (ref 78–100)
MCV RBC AUTO: 84 FL (ref 78–100)
PLATELET # BLD AUTO: 171 10E9/L (ref 150–450)
PLATELET # BLD AUTO: 172 10E9/L (ref 150–450)
PLATELET # BLD AUTO: 193 10E9/L (ref 150–450)
PROT UR-MCNC: 0.07 G/L
PROT/CREAT 24H UR: 0.17 G/G CR (ref 0–0.2)
RBC # BLD AUTO: 3.88 10E12/L (ref 3.8–5.2)
RBC # BLD AUTO: 3.89 10E12/L (ref 3.8–5.2)
RBC # BLD AUTO: 4.19 10E12/L (ref 3.8–5.2)
SARS-COV-2 RNA RESP QL NAA+PROBE: NEGATIVE
SPECIMEN SOURCE: NORMAL
WBC # BLD AUTO: 10.3 10E9/L (ref 4–11)
WBC # BLD AUTO: 10.8 10E9/L (ref 4–11)
WBC # BLD AUTO: 12 10E9/L (ref 4–11)

## 2021-03-29 PROCEDURE — 87635 SARS-COV-2 COVID-19 AMP PRB: CPT | Performed by: STUDENT IN AN ORGANIZED HEALTH CARE EDUCATION/TRAINING PROGRAM

## 2021-03-29 PROCEDURE — 86900 BLOOD TYPING SEROLOGIC ABO: CPT | Performed by: STUDENT IN AN ORGANIZED HEALTH CARE EDUCATION/TRAINING PROGRAM

## 2021-03-29 PROCEDURE — 258N000003 HC RX IP 258 OP 636: Performed by: STUDENT IN AN ORGANIZED HEALTH CARE EDUCATION/TRAINING PROGRAM

## 2021-03-29 PROCEDURE — 86923 COMPATIBILITY TEST ELECTRIC: CPT | Performed by: STUDENT IN AN ORGANIZED HEALTH CARE EDUCATION/TRAINING PROGRAM

## 2021-03-29 PROCEDURE — 85027 COMPLETE CBC AUTOMATED: CPT | Performed by: STUDENT IN AN ORGANIZED HEALTH CARE EDUCATION/TRAINING PROGRAM

## 2021-03-29 PROCEDURE — 250N000009 HC RX 250: Performed by: STUDENT IN AN ORGANIZED HEALTH CARE EDUCATION/TRAINING PROGRAM

## 2021-03-29 PROCEDURE — 86780 TREPONEMA PALLIDUM: CPT | Performed by: STUDENT IN AN ORGANIZED HEALTH CARE EDUCATION/TRAINING PROGRAM

## 2021-03-29 PROCEDURE — 84450 TRANSFERASE (AST) (SGOT): CPT | Performed by: OBSTETRICS & GYNECOLOGY

## 2021-03-29 PROCEDURE — G0463 HOSPITAL OUTPT CLINIC VISIT: HCPCS

## 2021-03-29 PROCEDURE — 120N000002 HC R&B MED SURG/OB UMMC

## 2021-03-29 PROCEDURE — 36415 COLL VENOUS BLD VENIPUNCTURE: CPT | Performed by: STUDENT IN AN ORGANIZED HEALTH CARE EDUCATION/TRAINING PROGRAM

## 2021-03-29 PROCEDURE — 86901 BLOOD TYPING SEROLOGIC RH(D): CPT | Performed by: STUDENT IN AN ORGANIZED HEALTH CARE EDUCATION/TRAINING PROGRAM

## 2021-03-29 PROCEDURE — 85730 THROMBOPLASTIN TIME PARTIAL: CPT | Performed by: STUDENT IN AN ORGANIZED HEALTH CARE EDUCATION/TRAINING PROGRAM

## 2021-03-29 PROCEDURE — 86850 RBC ANTIBODY SCREEN: CPT | Performed by: STUDENT IN AN ORGANIZED HEALTH CARE EDUCATION/TRAINING PROGRAM

## 2021-03-29 PROCEDURE — 84156 ASSAY OF PROTEIN URINE: CPT | Performed by: OBSTETRICS & GYNECOLOGY

## 2021-03-29 PROCEDURE — 84460 ALANINE AMINO (ALT) (SGPT): CPT | Performed by: OBSTETRICS & GYNECOLOGY

## 2021-03-29 PROCEDURE — 85460 HEMOGLOBIN FETAL: CPT | Performed by: OBSTETRICS & GYNECOLOGY

## 2021-03-29 PROCEDURE — 99207 PR PRENATAL VISIT: CPT | Performed by: OBSTETRICS & GYNECOLOGY

## 2021-03-29 PROCEDURE — 85027 COMPLETE CBC AUTOMATED: CPT | Performed by: OBSTETRICS & GYNECOLOGY

## 2021-03-29 PROCEDURE — 85384 FIBRINOGEN ACTIVITY: CPT | Performed by: STUDENT IN AN ORGANIZED HEALTH CARE EDUCATION/TRAINING PROGRAM

## 2021-03-29 PROCEDURE — G0463 HOSPITAL OUTPT CLINIC VISIT: HCPCS | Mod: 25

## 2021-03-29 PROCEDURE — 82565 ASSAY OF CREATININE: CPT | Performed by: OBSTETRICS & GYNECOLOGY

## 2021-03-29 PROCEDURE — 85610 PROTHROMBIN TIME: CPT | Performed by: STUDENT IN AN ORGANIZED HEALTH CARE EDUCATION/TRAINING PROGRAM

## 2021-03-29 PROCEDURE — 36415 COLL VENOUS BLD VENIPUNCTURE: CPT | Performed by: OBSTETRICS & GYNECOLOGY

## 2021-03-29 RX ORDER — SODIUM CHLORIDE, SODIUM LACTATE, POTASSIUM CHLORIDE, CALCIUM CHLORIDE 600; 310; 30; 20 MG/100ML; MG/100ML; MG/100ML; MG/100ML
INJECTION, SOLUTION INTRAVENOUS CONTINUOUS
Status: DISCONTINUED | OUTPATIENT
Start: 2021-03-29 | End: 2021-03-30

## 2021-03-29 RX ORDER — CARBOPROST TROMETHAMINE 250 UG/ML
250 INJECTION, SOLUTION INTRAMUSCULAR
Status: DISCONTINUED | OUTPATIENT
Start: 2021-03-29 | End: 2021-03-30

## 2021-03-29 RX ORDER — NALOXONE HYDROCHLORIDE 0.4 MG/ML
0.2 INJECTION, SOLUTION INTRAMUSCULAR; INTRAVENOUS; SUBCUTANEOUS
Status: DISCONTINUED | OUTPATIENT
Start: 2021-03-29 | End: 2021-03-30

## 2021-03-29 RX ORDER — OXYTOCIN 10 [USP'U]/ML
10 INJECTION, SOLUTION INTRAMUSCULAR; INTRAVENOUS
Status: DISCONTINUED | OUTPATIENT
Start: 2021-03-29 | End: 2021-03-30

## 2021-03-29 RX ORDER — OXYCODONE AND ACETAMINOPHEN 5; 325 MG/1; MG/1
1 TABLET ORAL
Status: DISCONTINUED | OUTPATIENT
Start: 2021-03-29 | End: 2021-03-30

## 2021-03-29 RX ORDER — FENTANYL CITRATE 50 UG/ML
50-100 INJECTION, SOLUTION INTRAMUSCULAR; INTRAVENOUS
Status: DISCONTINUED | OUTPATIENT
Start: 2021-03-29 | End: 2021-03-30

## 2021-03-29 RX ORDER — NALOXONE HYDROCHLORIDE 0.4 MG/ML
0.4 INJECTION, SOLUTION INTRAMUSCULAR; INTRAVENOUS; SUBCUTANEOUS
Status: DISCONTINUED | OUTPATIENT
Start: 2021-03-29 | End: 2021-03-30

## 2021-03-29 RX ORDER — OXYTOCIN/0.9 % SODIUM CHLORIDE 30/500 ML
1-24 PLASTIC BAG, INJECTION (ML) INTRAVENOUS CONTINUOUS
Status: DISCONTINUED | OUTPATIENT
Start: 2021-03-29 | End: 2021-03-30

## 2021-03-29 RX ORDER — PROCHLORPERAZINE 25 MG
25 SUPPOSITORY, RECTAL RECTAL EVERY 12 HOURS PRN
Status: DISCONTINUED | OUTPATIENT
Start: 2021-03-29 | End: 2021-03-30

## 2021-03-29 RX ORDER — METHYLERGONOVINE MALEATE 0.2 MG/ML
200 INJECTION INTRAVENOUS
Status: COMPLETED | OUTPATIENT
Start: 2021-03-29 | End: 2021-03-30

## 2021-03-29 RX ORDER — LIDOCAINE 40 MG/G
CREAM TOPICAL
Status: DISCONTINUED | OUTPATIENT
Start: 2021-03-29 | End: 2021-03-30

## 2021-03-29 RX ORDER — ACETAMINOPHEN 325 MG/1
650 TABLET ORAL EVERY 4 HOURS PRN
Status: DISCONTINUED | OUTPATIENT
Start: 2021-03-29 | End: 2021-03-30

## 2021-03-29 RX ORDER — IBUPROFEN 800 MG/1
800 TABLET, FILM COATED ORAL
Status: COMPLETED | OUTPATIENT
Start: 2021-03-29 | End: 2021-03-30

## 2021-03-29 RX ORDER — METOCLOPRAMIDE HYDROCHLORIDE 5 MG/ML
10 INJECTION INTRAMUSCULAR; INTRAVENOUS EVERY 6 HOURS PRN
Status: DISCONTINUED | OUTPATIENT
Start: 2021-03-29 | End: 2021-03-30

## 2021-03-29 RX ORDER — ONDANSETRON 2 MG/ML
4 INJECTION INTRAMUSCULAR; INTRAVENOUS EVERY 6 HOURS PRN
Status: DISCONTINUED | OUTPATIENT
Start: 2021-03-29 | End: 2021-03-30

## 2021-03-29 RX ORDER — OXYTOCIN/0.9 % SODIUM CHLORIDE 30/500 ML
100-340 PLASTIC BAG, INJECTION (ML) INTRAVENOUS CONTINUOUS PRN
Status: DISCONTINUED | OUTPATIENT
Start: 2021-03-29 | End: 2021-03-30

## 2021-03-29 RX ADMIN — Medication 2 MILLI-UNITS/MIN: at 23:32

## 2021-03-29 RX ADMIN — SODIUM CHLORIDE, POTASSIUM CHLORIDE, SODIUM LACTATE AND CALCIUM CHLORIDE: 600; 310; 30; 20 INJECTION, SOLUTION INTRAVENOUS at 23:27

## 2021-03-29 ASSESSMENT — ACTIVITIES OF DAILY LIVING (ADL)
TOILETING_ISSUES: NO
FALL_HISTORY_WITHIN_LAST_SIX_MONTHS: NO

## 2021-03-29 ASSESSMENT — MIFFLIN-ST. JEOR: SCORE: 1578.16
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
## 2021-03-29 NOTE — LETTER
3/29/2021       RE: Nova Matthews  [address]  [facility] [address]     Dear Colleague,    Thank you for referring your patient, Nova Matthews, to the [facility] [place] at [facility]. Please see a copy of my visit note below.    NEDA Visit 3/29/21    S:  Patient is doing well. She is feeling good fetal movement. She has not been having any contractions at this time. She has not noticed any changes in her vaginal discharge. No vaginal bleeding. She denies any chest pain, SOB, RUQ pain, headaches, vision changed, and any lower extremity swelling.     She notes that she is managing her constipation well and the Senna and Miralax daily helps keep her bowel movements regular. She has heartburn occasionally, but takes Jazmin to manage her symptoms. At this time, she feels that the Tums manage her heartburn well and would not like to use anything.     Patient felt a bit nauseous earlier today, but she contributes that to being hungry and tired from working a few night shifts in a row recently. She had something to eat and drank some water and she started to feel better.     O:  See OB Flowsheet    Cervix: 50% effaced and 1cm dilated     A/P: 31 yo  @ 38w5d presents for NEDA visit  1) PNC: Rh positive, Kimmie negative, Rubella immune, Infectious labs wnl, GCT 92.   2) Genetic screening: Normal NT, First trimester screen increased the risk for Down syndrome from her age related risk prior to screening but below screening cutoff. Normal NIPT.  AFP negative.  Level II US normal, having a boy.  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  Required IVF due to sperm count. On ASA due to primigravida and IVF pregnancy. Fetal echo  normal.  4) Velamentous cord insertion: Plan to follow with growth scans every 4 weeks, Last 3/8/2021 EFW 50%ile.   5) History abnormal pap: 2017, ASCUS with  negative high risk HPV. Follow up recommended in 3 years.  Pt had colposcopy completed at [facility] on 6/25/2019 and another pap on 7/8/2019. Pt reported results were normal. Would like to defer pap until postpartum.   6) Delivery planning: epidural/breastfeed/unsure on contraception   7) Nausea: Improved, occasional Vitamin B6 and Unisom  8) Constipation: Senna and Miralax prn  9) Sleep: Melatonin and Unisom prn. Discussed that it is OK to use Unisom nightly for sleep.   10) GERD: Using tums prn, if worsens will initiate prilosec  11) s/p flu shot, received first dose of COVID vaccine 1/13/2021, second dose on 2/1/2021, s/p Tdap 2/24/2021  12) GBS negative  13) Elevated BP today (132/90). Preeclampsia labs today (ALT, AST, CBC with platelets, creatinine, urine protein). If normal, return to clinic tomorrow for BP recheck. If abnormal, will notify patient to go to labor and delivery for further evaluation.  Discussed if GHTN, recommendation for induction.  Patient understands and agreeable with plan.  14) RTC in 1 week for NEDA visit, labor precautions discussed    This patient was seen and discussed with Dr. Valentin.  Luli Zarate, MS3    Pt seen and discussed with Dr. Valentin.     Physician Attestation     I, Dorothea Valentin, was present with the medical student who participated in the service and in the documentation of the note. I have verified the history and personally performed the physical exam and medical decision making. I agree with the assessment and plan of care as documented in the note.       Dorothea Valentin MD
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
## 2021-03-30 ENCOUNTER — ANESTHESIA EVENT (OUTPATIENT)
Dept: OBGYN | Facility: CLINIC | Age: 31
End: 2021-03-30
Payer: COMMERCIAL

## 2021-03-30 ENCOUNTER — ANESTHESIA (OUTPATIENT)
Dept: OBGYN | Facility: CLINIC | Age: 31
End: 2021-03-30
Payer: COMMERCIAL

## 2021-03-30 LAB
ABO + RH BLD: NORMAL
ABO + RH BLD: NORMAL
APTT PPP: 26 SEC (ref 22–37)
APTT PPP: 28 SEC (ref 22–37)
BLD GP AB SCN SERPL QL: NORMAL
BLD PROD TYP BPU: NORMAL
BLOOD BANK CMNT PATIENT-IMP: NORMAL
ERYTHROCYTE [DISTWIDTH] IN BLOOD BY AUTOMATED COUNT: 14.7 % (ref 10–15)
FIBRINOGEN PPP-MCNC: 397 MG/DL (ref 200–420)
FIBRINOGEN PPP-MCNC: 413 MG/DL (ref 200–420)
HCT VFR BLD AUTO: 31.5 % (ref 35–47)
HGB BLD-MCNC: 10.4 G/DL (ref 11.7–15.7)
INR PPP: 0.96 (ref 0.86–1.14)
INR PPP: 0.98 (ref 0.86–1.14)
MCH RBC QN AUTO: 27.4 PG (ref 26.5–33)
MCHC RBC AUTO-ENTMCNC: 33 G/DL (ref 31.5–36.5)
MCV RBC AUTO: 83 FL (ref 78–100)
NUM BPU REQUESTED: 2
PLATELET # BLD AUTO: 169 10E9/L (ref 150–450)
RBC # BLD AUTO: 3.8 10E12/L (ref 3.8–5.2)
SPECIMEN EXP DATE BLD: NORMAL
T PALLIDUM AB SER QL: NONREACTIVE
WBC # BLD AUTO: 14.2 10E9/L (ref 4–11)

## 2021-03-30 PROCEDURE — 250N000013 HC RX MED GY IP 250 OP 250 PS 637

## 2021-03-30 PROCEDURE — 722N000001 HC LABOR CARE VAGINAL DELIVERY SINGLE

## 2021-03-30 PROCEDURE — 250N000013 HC RX MED GY IP 250 OP 250 PS 637: Performed by: STUDENT IN AN ORGANIZED HEALTH CARE EDUCATION/TRAINING PROGRAM

## 2021-03-30 PROCEDURE — 59400 OBSTETRICAL CARE: CPT | Mod: GC | Performed by: OBSTETRICS & GYNECOLOGY

## 2021-03-30 PROCEDURE — 258N000003 HC RX IP 258 OP 636: Performed by: STUDENT IN AN ORGANIZED HEALTH CARE EDUCATION/TRAINING PROGRAM

## 2021-03-30 PROCEDURE — 85610 PROTHROMBIN TIME: CPT | Performed by: OBSTETRICS & GYNECOLOGY

## 2021-03-30 PROCEDURE — 85730 THROMBOPLASTIN TIME PARTIAL: CPT | Performed by: STUDENT IN AN ORGANIZED HEALTH CARE EDUCATION/TRAINING PROGRAM

## 2021-03-30 PROCEDURE — 250N000011 HC RX IP 250 OP 636: Performed by: PEDIATRICS

## 2021-03-30 PROCEDURE — 36415 COLL VENOUS BLD VENIPUNCTURE: CPT | Performed by: OBSTETRICS & GYNECOLOGY

## 2021-03-30 PROCEDURE — 85610 PROTHROMBIN TIME: CPT | Performed by: STUDENT IN AN ORGANIZED HEALTH CARE EDUCATION/TRAINING PROGRAM

## 2021-03-30 PROCEDURE — 85027 COMPLETE CBC AUTOMATED: CPT | Performed by: OBSTETRICS & GYNECOLOGY

## 2021-03-30 PROCEDURE — 250N000011 HC RX IP 250 OP 636: Performed by: ANESTHESIOLOGY

## 2021-03-30 PROCEDURE — 36415 COLL VENOUS BLD VENIPUNCTURE: CPT | Performed by: STUDENT IN AN ORGANIZED HEALTH CARE EDUCATION/TRAINING PROGRAM

## 2021-03-30 PROCEDURE — 85384 FIBRINOGEN ACTIVITY: CPT | Performed by: OBSTETRICS & GYNECOLOGY

## 2021-03-30 PROCEDURE — 86901 BLOOD TYPING SEROLOGIC RH(D): CPT | Performed by: OBSTETRICS & GYNECOLOGY

## 2021-03-30 PROCEDURE — 250N000011 HC RX IP 250 OP 636: Performed by: STUDENT IN AN ORGANIZED HEALTH CARE EDUCATION/TRAINING PROGRAM

## 2021-03-30 PROCEDURE — 999N000016 HC STATISTIC ATTENDANCE AT DELIVERY

## 2021-03-30 PROCEDURE — 250N000009 HC RX 250: Performed by: STUDENT IN AN ORGANIZED HEALTH CARE EDUCATION/TRAINING PROGRAM

## 2021-03-30 PROCEDURE — 3E0R3BZ INTRODUCTION OF ANESTHETIC AGENT INTO SPINAL CANAL, PERCUTANEOUS APPROACH: ICD-10-PCS | Performed by: ANESTHESIOLOGY

## 2021-03-30 PROCEDURE — 120N000002 HC R&B MED SURG/OB UMMC

## 2021-03-30 PROCEDURE — 00HU33Z INSERTION OF INFUSION DEVICE INTO SPINAL CANAL, PERCUTANEOUS APPROACH: ICD-10-PCS | Performed by: ANESTHESIOLOGY

## 2021-03-30 PROCEDURE — 250N000011 HC RX IP 250 OP 636

## 2021-03-30 PROCEDURE — 10907ZC DRAINAGE OF AMNIOTIC FLUID, THERAPEUTIC FROM PRODUCTS OF CONCEPTION, VIA NATURAL OR ARTIFICIAL OPENING: ICD-10-PCS | Performed by: OBSTETRICS & GYNECOLOGY

## 2021-03-30 PROCEDURE — 85730 THROMBOPLASTIN TIME PARTIAL: CPT | Performed by: OBSTETRICS & GYNECOLOGY

## 2021-03-30 PROCEDURE — 258N000003 HC RX IP 258 OP 636: Performed by: ANESTHESIOLOGY

## 2021-03-30 PROCEDURE — 85384 FIBRINOGEN ACTIVITY: CPT | Performed by: STUDENT IN AN ORGANIZED HEALTH CARE EDUCATION/TRAINING PROGRAM

## 2021-03-30 RX ORDER — MODIFIED LANOLIN
OINTMENT (GRAM) TOPICAL
Status: DISCONTINUED | OUTPATIENT
Start: 2021-03-30 | End: 2021-03-31 | Stop reason: HOSPADM

## 2021-03-30 RX ORDER — AMOXICILLIN 250 MG
2 CAPSULE ORAL 2 TIMES DAILY
Status: DISCONTINUED | OUTPATIENT
Start: 2021-03-30 | End: 2021-03-31 | Stop reason: HOSPADM

## 2021-03-30 RX ORDER — OXYTOCIN 10 [USP'U]/ML
10 INJECTION, SOLUTION INTRAMUSCULAR; INTRAVENOUS
Status: DISCONTINUED | OUTPATIENT
Start: 2021-03-30 | End: 2021-03-31 | Stop reason: HOSPADM

## 2021-03-30 RX ORDER — METHYLERGONOVINE MALEATE 0.2 MG/ML
INJECTION INTRAVENOUS
Status: COMPLETED
Start: 2021-03-30 | End: 2021-03-30

## 2021-03-30 RX ORDER — ONDANSETRON 4 MG/1
4 TABLET, ORALLY DISINTEGRATING ORAL EVERY 6 HOURS PRN
Status: DISCONTINUED | OUTPATIENT
Start: 2021-03-30 | End: 2021-03-30

## 2021-03-30 RX ORDER — AMOXICILLIN 250 MG
1 CAPSULE ORAL 2 TIMES DAILY
Status: DISCONTINUED | OUTPATIENT
Start: 2021-03-30 | End: 2021-03-31 | Stop reason: HOSPADM

## 2021-03-30 RX ORDER — BISACODYL 10 MG
10 SUPPOSITORY, RECTAL RECTAL DAILY PRN
Status: DISCONTINUED | OUTPATIENT
Start: 2021-04-01 | End: 2021-03-31 | Stop reason: HOSPADM

## 2021-03-30 RX ORDER — OXYTOCIN/0.9 % SODIUM CHLORIDE 30/500 ML
100 PLASTIC BAG, INJECTION (ML) INTRAVENOUS CONTINUOUS
Status: DISCONTINUED | OUTPATIENT
Start: 2021-03-30 | End: 2021-03-31 | Stop reason: HOSPADM

## 2021-03-30 RX ORDER — OXYTOCIN/0.9 % SODIUM CHLORIDE 30/500 ML
340 PLASTIC BAG, INJECTION (ML) INTRAVENOUS CONTINUOUS PRN
Status: DISCONTINUED | OUTPATIENT
Start: 2021-03-30 | End: 2021-03-31 | Stop reason: HOSPADM

## 2021-03-30 RX ORDER — ACETAMINOPHEN 325 MG/1
650 TABLET ORAL EVERY 4 HOURS PRN
Status: DISCONTINUED | OUTPATIENT
Start: 2021-03-30 | End: 2021-03-31 | Stop reason: HOSPADM

## 2021-03-30 RX ORDER — EPHEDRINE SULFATE 50 MG/ML
5 INJECTION, SOLUTION INTRAMUSCULAR; INTRAVENOUS; SUBCUTANEOUS
Status: DISCONTINUED | OUTPATIENT
Start: 2021-03-30 | End: 2021-03-30

## 2021-03-30 RX ORDER — ONDANSETRON 2 MG/ML
4 INJECTION INTRAMUSCULAR; INTRAVENOUS EVERY 6 HOURS PRN
Status: DISCONTINUED | OUTPATIENT
Start: 2021-03-30 | End: 2021-03-30

## 2021-03-30 RX ORDER — OXYTOCIN 10 [USP'U]/ML
INJECTION, SOLUTION INTRAMUSCULAR; INTRAVENOUS
Status: DISCONTINUED
Start: 2021-03-30 | End: 2021-03-30 | Stop reason: HOSPADM

## 2021-03-30 RX ORDER — IBUPROFEN 800 MG/1
800 TABLET, FILM COATED ORAL EVERY 6 HOURS PRN
Status: DISCONTINUED | OUTPATIENT
Start: 2021-03-30 | End: 2021-03-31 | Stop reason: HOSPADM

## 2021-03-30 RX ORDER — CALCIUM CARBONATE 500 MG/1
500-1000 TABLET, CHEWABLE ORAL 4 TIMES DAILY PRN
Status: DISCONTINUED | OUTPATIENT
Start: 2021-03-30 | End: 2021-03-30

## 2021-03-30 RX ORDER — MISOPROSTOL 200 UG/1
TABLET ORAL
Status: COMPLETED
Start: 2021-03-30 | End: 2021-03-30

## 2021-03-30 RX ORDER — LIDOCAINE HYDROCHLORIDE AND EPINEPHRINE 15; 5 MG/ML; UG/ML
3 INJECTION, SOLUTION EPIDURAL
Status: DISCONTINUED | OUTPATIENT
Start: 2021-03-30 | End: 2021-03-30

## 2021-03-30 RX ORDER — OXYTOCIN/0.9 % SODIUM CHLORIDE 30/500 ML
PLASTIC BAG, INJECTION (ML) INTRAVENOUS
Status: DISCONTINUED
Start: 2021-03-30 | End: 2021-03-30 | Stop reason: HOSPADM

## 2021-03-30 RX ORDER — NALBUPHINE HYDROCHLORIDE 10 MG/ML
2.5-5 INJECTION, SOLUTION INTRAMUSCULAR; INTRAVENOUS; SUBCUTANEOUS EVERY 6 HOURS PRN
Status: DISCONTINUED | OUTPATIENT
Start: 2021-03-30 | End: 2021-03-30

## 2021-03-30 RX ORDER — LIDOCAINE HYDROCHLORIDE 10 MG/ML
INJECTION, SOLUTION EPIDURAL; INFILTRATION; INTRACAUDAL; PERINEURAL
Status: DISCONTINUED
Start: 2021-03-30 | End: 2021-03-30 | Stop reason: HOSPADM

## 2021-03-30 RX ORDER — CEFAZOLIN SODIUM 2 G/100ML
2 INJECTION, SOLUTION INTRAVENOUS ONCE
Status: COMPLETED | OUTPATIENT
Start: 2021-03-30 | End: 2021-03-30

## 2021-03-30 RX ORDER — FENTANYL/BUPIVACAINE/NS/PF 2-1250MCG
PLASTIC BAG, INJECTION (ML) INJECTION
Status: COMPLETED
Start: 2021-03-30 | End: 2021-03-30

## 2021-03-30 RX ORDER — LIDOCAINE HCL/EPINEPHRINE/PF 2%-1:200K
VIAL (ML) INJECTION PRN
Status: DISCONTINUED | OUTPATIENT
Start: 2021-03-30 | End: 2021-05-03 | Stop reason: HOSPADM

## 2021-03-30 RX ORDER — HYDROCORTISONE 2.5 %
CREAM (GRAM) TOPICAL 3 TIMES DAILY PRN
Status: DISCONTINUED | OUTPATIENT
Start: 2021-03-30 | End: 2021-03-31 | Stop reason: HOSPADM

## 2021-03-30 RX ORDER — EPHEDRINE SULFATE 50 MG/ML
INJECTION, SOLUTION INTRAMUSCULAR; INTRAVENOUS; SUBCUTANEOUS
Status: DISCONTINUED
Start: 2021-03-30 | End: 2021-03-30 | Stop reason: HOSPADM

## 2021-03-30 RX ADMIN — Medication: at 09:53

## 2021-03-30 RX ADMIN — Medication: at 15:07

## 2021-03-30 RX ADMIN — METHYLERGONOVINE MALEATE 200 MCG: 0.2 INJECTION INTRAVENOUS at 16:00

## 2021-03-30 RX ADMIN — SODIUM CHLORIDE, POTASSIUM CHLORIDE, SODIUM LACTATE AND CALCIUM CHLORIDE: 600; 310; 30; 20 INJECTION, SOLUTION INTRAVENOUS at 10:29

## 2021-03-30 RX ADMIN — ANTACID TABLETS 500 MG: 500 TABLET, CHEWABLE ORAL at 14:27

## 2021-03-30 RX ADMIN — ACETAMINOPHEN 650 MG: 325 TABLET, FILM COATED ORAL at 23:53

## 2021-03-30 RX ADMIN — SODIUM CHLORIDE, POTASSIUM CHLORIDE, SODIUM LACTATE AND CALCIUM CHLORIDE 1000 ML: 600; 310; 30; 20 INJECTION, SOLUTION INTRAVENOUS at 09:52

## 2021-03-30 RX ADMIN — ACETAMINOPHEN 650 MG: 325 TABLET, FILM COATED ORAL at 05:35

## 2021-03-30 RX ADMIN — MISOPROSTOL 400 MCG: 200 TABLET ORAL at 16:49

## 2021-03-30 RX ADMIN — LIDOCAINE HYDROCHLORIDE,EPINEPHRINE BITARTRATE 10 ML: 20; .005 INJECTION, SOLUTION EPIDURAL; INFILTRATION; INTRACAUDAL; PERINEURAL at 15:25

## 2021-03-30 RX ADMIN — DOCUSATE SODIUM 50 MG AND SENNOSIDES 8.6 MG 1 TABLET: 8.6; 5 TABLET, FILM COATED ORAL at 20:06

## 2021-03-30 RX ADMIN — METHYLERGONOVINE MALEATE 200 MCG: 0.2 INJECTION INTRAVENOUS at 16:30

## 2021-03-30 RX ADMIN — ACETAMINOPHEN 650 MG: 325 TABLET, FILM COATED ORAL at 20:06

## 2021-03-30 RX ADMIN — SODIUM CHLORIDE, POTASSIUM CHLORIDE, SODIUM LACTATE AND CALCIUM CHLORIDE: 600; 310; 30; 20 INJECTION, SOLUTION INTRAVENOUS at 07:38

## 2021-03-30 RX ADMIN — ANTACID TABLETS 1000 MG: 500 TABLET, CHEWABLE ORAL at 00:14

## 2021-03-30 RX ADMIN — MISOPROSTOL 200 MCG: 200 TABLET ORAL at 15:55

## 2021-03-30 RX ADMIN — IBUPROFEN 800 MG: 800 TABLET ORAL at 18:56

## 2021-03-30 RX ADMIN — CEFAZOLIN SODIUM 2 G: 2 INJECTION, SOLUTION INTRAVENOUS at 16:47
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
## 2021-03-30 NOTE — PLAN OF CARE
of viable Male with Dr Aguirre  in attendance.  NICU/Nurserywas present.  Infant with spontaneous cry, to mother's abdomen, dried and stimulated.  Placenta delivered with out complication, moderate amount of rubra with fundal massage by Dr Lawson, continued to have moderate amount of rubra lochia, medications given per order see MAR, Manual uterine sweep preformed by Dr Lawson.  1st laceration, without repair, madeleine cares provided.  Mother and baby in stable condition.

## 2021-03-30 NOTE — PROGRESS NOTES
Progress Note:    KB result positive (16.49mL fetal blood) suggesting that abruption did occur. Patient Rh pos, thus no rhogam required.    FHT  Baseline: 135  Variability: Moderate  Accels: Present  Decels: Absent  [place]: irregular, ~2 in 10 minutes    BSUS confirms cephalic    Discussed KB finding with patient. Given evidence of abruption at 38w5d recommend IOL. Patient also had an elevated BP, edging on diagnosis of GHTN. Patient discussed with her  and would like to proceed with augmentation. Will augment with pitocin. Tracing Cat 1. Continuous monitoring.    Discussed with Dr. Beckford.    Mikie Persaud MD MPH  OB/Gyn PGY-2  03/29/21 11:44 PM
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
## 2021-03-30 NOTE — PLAN OF CARE
Pitocin remains at 14 mu, contractions q 2-4 min palpating mild. Category 1 FHR tracing, VSS,  Pt requesting epidural prior to AROM. Anesthesia aware.  Anticipate .

## 2021-03-30 NOTE — PROVIDER NOTIFICATION
21 1010 21 1015   Provider Notification   Provider Name/Title Dr Aguirre  (in to meet pt) Dr Lawson  (AROM done)   Method of Notification At Bedside At Bedside   Pt comfortable with epidural. AROM per Dr Lawson, clear fluid. Pitocin increased to 16 mu/min.   Anticipate  this evening.
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
## 2021-03-30 NOTE — ANESTHESIA PROCEDURE NOTES
Epidural catheter Procedure Note  Pre-Procedure   Staff -        Anesthesiologist:  Tom Duarte MD       Performed By: anesthesiologist       Procedure Start/Stop Times: 3/30/2021 9:27 AM and 3/30/2021 9:40 AM       Pre-Anesthestic Checklist: patient identified, IV checked, risks and benefits discussed, informed consent, monitors and equipment checked, pre-op evaluation, at physician/surgeon's request and post-op pain management  Timeout:       Correct Patient: Yes        Correct Procedure: Yes        Correct Position: Yes   Procedure Documentation  Procedure: epidural catheter       Diagnosis: labor pain       Patient Position: sitting       Skin prep: Chloraprep       Local skin infiltrated with 2 mL of 1% lidocaine.        Insertion Site: L2-3. (midline approach).       Technique: LORT saline        ALIREZA at 3 cm.       Needle Type: [employer] needle       Needle Gauge: 17.        Needle Length (Inches): 3.5        Catheter: 20 G.         Catheter threaded easily.         Threaded 10 cm at skin.         # of attempts: 1 and  # of redirects:  0    Assessment/Narrative         Paresthesias: No.     Test dose of 3 mL at.         Test dose negative, 3 minutes after injection, for signs of intravascular, subdural, or intrathecal injection.       Insertion/Infusion Method: LORT saline       Aspiration negative for Heme or CSF via Epidural Catheter.       Sensory Level Left: T12.       Sensory Level Right: T9.    Medication(s) Administered   0.125% Bupivacaine + 2 mcg/mL Fentanyl via CADD (Epidural), 8 mL  Comments:    Pt LLD to raise l sided lavel.

## 2021-03-30 NOTE — PROVIDER NOTIFICATION
03/30/21 1300 03/30/21 1315   Provider Notification   Provider Name/Title Dr Lawson, Dr Duarte  ([facility] Dr Aguirre  ([facility] [place]   Method of Notification At Bedside At Bedside   Pt c/o wetness in back and pink serosanguinous fluid on sheets and monitor straps. Cervix is 9 + cm, Dr Duarte, NABEEL called to evaluate epidural leaking serosanguinous fluid from covered epidural cath injection site. Pt states she feels epidural is not working anymore as she is feeling pain and less numb.   Epidural dressing reinforced by Dr Duarte and encouraged pt to let us know if pt continues to feel leaking.   Pt C/O severe back pain with pushing and especially after ctx is over. Back message provided, position changes with Dr Aguirre at bedside. resident Ruth MDA at bedside to bolus epidural at 1515.   Bedside SBAR given to Anabella Landis RN
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
## 2021-03-30 NOTE — INTERIM SUMMARY
Called to assess pt c/o increasing back pain. Pt had Epidural placed earlier today with good effect however has been noted to have some clear drainage around the catheter site which was assessed and dressings reinforced earlier in the day. Pt complaining of intense back pain with contractions and is very near delivery at this time. Discussed with staff and bolused 10ml lidocaine 2% with Epi without significant relief over the following 10 minutes. Pt does not wish to consider epidural replacement at this time as contractions are coming quickly and she is too uncomfortable to sit. Pt delivered  shortly thereafter.    France Antonio MD  Anesthesiology Resident, CA-1

## 2021-03-30 NOTE — PLAN OF CARE
Data: Nova Matthews transferred to 7123 via wheelchair at 1840. Baby transferred via parent's arms.  Action: Receiving unit notified of transfer: Yes. Patient and family notified of room change. Bedside report given to Bindu. Belongings sent to receiving unit. Accompanied by Registered Nurse. Oriented patient to surroundings. Call light within reach. ID bands double-checked with receiving RN.  Response: Patient tolerated transfer and is stable.
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
## 2021-03-30 NOTE — PROVIDER NOTIFICATION
03/30/21 0817   Provider Notification   Provider Name/Title Dr Lawson  (SVE done and offered AROM)   Method of Notification At Bedside   SVE 4 80 -3 Pt offered AROM Pt prefers to wait and get epidural prior to AROM. Dr Lawson and Dr Duarte with anesthesia made aware.

## 2021-03-30 NOTE — PLAN OF CARE
Labor Shift Note  Data: Contraction irregular, palpate not felt by patient. Fetal assessment category one.   Vitals:    21 1705 21 2055   BP: 126/84 125/59   Resp: 16 16   Temp: 98.6  F (37  C)    TempSrc: Oral    . No intake/output data recorded..  Intact, no vg bleeding  Signs and symptoms of infection absent.  Blood pressures WNL. Signs and symptoms of pre-eclampsia: absent.  Support person Por present.  Interventions: Continue uterine/fetal assessment continuous. Vital Signs per order set. Comfort measures rest, support person.  Medicated for none.  Plan: Plan to start induction either tonight or tomorrow morning, Anticipate . Provide labor/coping assistance as needed by patient and support person.  Observe for and notify care provider of indications of progressing labor, need for pain medications,  or signs of fetal/maternal compromise.
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
## 2021-03-31 VITALS
DIASTOLIC BLOOD PRESSURE: 75 MMHG | HEART RATE: 73 BPM | RESPIRATION RATE: 16 BRPM | OXYGEN SATURATION: 98 % | TEMPERATURE: 98.1 F | SYSTOLIC BLOOD PRESSURE: 112 MMHG

## 2021-03-31 LAB — HGB BLD-MCNC: 7.7 G/DL (ref 11.7–15.7)

## 2021-03-31 PROCEDURE — 36415 COLL VENOUS BLD VENIPUNCTURE: CPT | Performed by: STUDENT IN AN ORGANIZED HEALTH CARE EDUCATION/TRAINING PROGRAM

## 2021-03-31 PROCEDURE — 250N000013 HC RX MED GY IP 250 OP 250 PS 637: Performed by: STUDENT IN AN ORGANIZED HEALTH CARE EDUCATION/TRAINING PROGRAM

## 2021-03-31 PROCEDURE — 85018 HEMOGLOBIN: CPT | Performed by: STUDENT IN AN ORGANIZED HEALTH CARE EDUCATION/TRAINING PROGRAM

## 2021-03-31 RX ORDER — FERROUS SULFATE 325(65) MG
325 TABLET ORAL 2 TIMES DAILY
Qty: 60 TABLET | Refills: 1 | Status: SHIPPED | OUTPATIENT
Start: 2021-03-31 | End: 2021-05-17

## 2021-03-31 RX ORDER — ACETAMINOPHEN 325 MG/1
650 TABLET ORAL EVERY 6 HOURS PRN
Qty: 100 TABLET | Refills: 0 | Status: SHIPPED | OUTPATIENT
Start: 2021-03-31 | End: 2022-10-17

## 2021-03-31 RX ORDER — IBUPROFEN 600 MG/1
600 TABLET, FILM COATED ORAL EVERY 6 HOURS PRN
Qty: 60 TABLET | Refills: 0 | Status: SHIPPED | OUTPATIENT
Start: 2021-03-31 | End: 2021-05-17

## 2021-03-31 RX ORDER — AMOXICILLIN 250 MG
1 CAPSULE ORAL DAILY
Qty: 100 TABLET | Refills: 0 | Status: SHIPPED | OUTPATIENT
Start: 2021-03-31 | End: 2021-05-17

## 2021-03-31 RX ADMIN — IBUPROFEN 800 MG: 800 TABLET ORAL at 14:17

## 2021-03-31 RX ADMIN — IBUPROFEN 800 MG: 800 TABLET ORAL at 08:46

## 2021-03-31 RX ADMIN — ACETAMINOPHEN 650 MG: 325 TABLET, FILM COATED ORAL at 11:47

## 2021-03-31 RX ADMIN — DOCUSATE SODIUM 50 MG AND SENNOSIDES 8.6 MG 1 TABLET: 8.6; 5 TABLET, FILM COATED ORAL at 08:46

## 2021-03-31 RX ADMIN — IBUPROFEN 800 MG: 800 TABLET ORAL at 02:32

## 2021-03-31 RX ADMIN — ACETAMINOPHEN 650 MG: 325 TABLET, FILM COATED ORAL at 08:46

## 2021-03-31 RX ADMIN — ACETAMINOPHEN 650 MG: 325 TABLET, FILM COATED ORAL at 05:22

## 2021-03-31 NOTE — PLAN OF CARE
Patient is doing well and adequate for discharge. IV out, patient is ambulating and voiding independently. VSS. Discharge education done. Baby gift, home medications given to patient.

## 2021-03-31 NOTE — PLAN OF CARE
Discharge medications reviewed with patient, no questions from patient. Patient already discharged on previous shift.

## 2021-03-31 NOTE — PLAN OF CARE
VSS. Postpartum assessment WDL. Fundus firm, at midline. Lochia scant. Encouraged frequent voiding and measurement until two collections of >300cc x2. Perineal soreness and back pain managed with Ibuprofen and Tylenol - states pain is comfortably manageable. Provided education on expected physiological changes after delivery and basic infant care. Spouse present, supportive with cares. Continue plan of care.

## 2021-03-31 NOTE — PROGRESS NOTES
Epidural Anesthesia Follow Up Note    Patient: Nova Matthews    Patient location: Postpartum floor.    Chief complaint: Acute postoperative pain management s/p Epidural placement and subsequent removal following     Procedure(s) Performed:      Anesthesia type: Epidural    Subjective:     Pain Control: resting comfortably at this time. Pain adequately controlled by PO medications     Additional ROS:  Denies any pruritis, weakness, paresthesia, difficulties breathing or voiding, headache, nausea or vomiting. She is able to ambulate and tolerates regular diet.     Objective:    Respiratory Function (RR / SpO2 / Airway Patency): Satisfactory    Cardiac Function (HR / Rhythm / BP): Satisfactory    Strength and sensation lower extremities: Normal    Site of spinal/epidural insertion: No signs of infection or inflammation.     Last Vitals: /75   Pulse 73   Temp 36.7  C (98.1  F) (Oral)   Resp 16   SpO2 98%   Breastfeeding Unknown     Assessment and plan:   Nova Matthews is a 30 year old female  who received Epidural anesthesia for Labor pain. Unfortunately epidural appeared to have decreased effectiveness nearing delivery time and bolusing did not significantly impact analgesia. Suspect displacement. Pt chose not to replace as she was very close to delivering and could not remain still.    In brief summary, her post-operative analgesia is adequate today, and without notable post-anesthetic complications. Thus, we recommend proceeding with PO analgesics including staggered dosing of NSAIDs (ibuprofen) and acetaminophen, with a taper of oxycodone.     Thank you for including us in the care for this patient.    France Antonio MD  Anesthesia Resident, PGY2

## 2021-03-31 NOTE — LACTATION NOTE
This note was copied from a baby's chart.  Consult for: First time breastfeeding     Delivery Information: Baby Erick was born at 38.6 weeks via vaginal delivery yesterday at 1545.     Maternal Health History:  Nova does not have a significant health history. This was an IVF pregnancy. Her delivery QBL was 1302 ml.    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    Nova noted breast growth and sensitivity in early pregnancy. She has been practicing hand expression of colostrum and has been able to express colostrum into a cup. Her breasts appear symmetrical with bilateral everted, intact nipples.  ?  Infant information: Hayden Suarez has age appropriate output. He was AGA at birth at 7lb 11oz. He is <24 hours old. ?    Feeding Assessment: Nova is independently able to position and latch Erick. He is occasionally sliding back on to the nipple during feedings. Nova kept her breast compressed until he established a strong, coordinated suck and also provided good head/neck support and this appeared to correct this issue.     Infant appeared to have a coordinated, effective suck and was heard swallowing during the feeding. Nova denied discomfort.    Nova was able to demonstrate good hand expression technique.     Education: early feeding cues, benefits of feeding on cue, breastfeeding positions, signs breastfeeding is going well (comfortable latch, age appropriate output and weight loss, swallowing heard at the breast), satiety cues, expected  output,  weight loss, nutritive vs non-nutritive sucking, benefits of skin to skin, the Second Night, benefits of breast massage and hand expression of colostrum, Infant Feeding Log handout, indications for pumping/ pumping recommendations, inpatient lactation support and outpatient lactation resources.      Plan: Continue breastfeeding on cue with RN support as needed with a goal of 8-12 feedings per day.  Encourage frequent skin to skin, breast massage and hand expression.     Family is hoping to discharge this afternoon at 24 hours post partum. They plan to follow up with the LC at [facility] as needed for outpatient lactation support.    Nova has a Spectra pump to use at home.

## 2021-04-01 LAB
BLD PROD TYP BPU: NORMAL
BLD PROD TYP BPU: NORMAL
BLD UNIT ID BPU: 0
BLD UNIT ID BPU: 0
BLOOD PRODUCT CODE: NORMAL
BLOOD PRODUCT CODE: NORMAL
BPU ID: NORMAL
BPU ID: NORMAL
TRANSFUSION STATUS PATIENT QL: NORMAL
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
## 2021-04-05 ENCOUNTER — OFFICE VISIT (OUTPATIENT)
Dept: OBGYN | Facility: CLINIC | Age: 31
End: 2021-04-05
Attending: OBSTETRICS & GYNECOLOGY
Payer: COMMERCIAL

## 2021-04-05 VITALS
HEIGHT: 68 IN | DIASTOLIC BLOOD PRESSURE: 81 MMHG | HEART RATE: 75 BPM | BODY MASS INDEX: 27.31 KG/M2 | SYSTOLIC BLOOD PRESSURE: 120 MMHG

## 2021-04-05 DIAGNOSIS — G43.809 OTHER MIGRAINE WITHOUT STATUS MIGRAINOSUS, NOT INTRACTABLE: ICD-10-CM

## 2021-04-05 DIAGNOSIS — Z87.59 HISTORY OF GESTATIONAL HYPERTENSION: ICD-10-CM

## 2021-04-05 LAB
ALT SERPL W P-5'-P-CCNC: 47 U/L (ref 0–50)
AST SERPL W P-5'-P-CCNC: 24 U/L (ref 0–45)
CREAT SERPL-MCNC: 0.58 MG/DL (ref 0.52–1.04)
CREAT UR-MCNC: 11 MG/DL
ERYTHROCYTE [DISTWIDTH] IN BLOOD BY AUTOMATED COUNT: 15.7 % (ref 10–15)
GFR SERPL CREATININE-BSD FRML MDRD: >90 ML/MIN/{1.73_M2}
HCT VFR BLD AUTO: 26.7 % (ref 35–47)
HGB BLD-MCNC: 8.3 G/DL (ref 11.7–15.7)
MCH RBC QN AUTO: 26.9 PG (ref 26.5–33)
MCHC RBC AUTO-ENTMCNC: 31.1 G/DL (ref 31.5–36.5)
MCV RBC AUTO: 87 FL (ref 78–100)
PLATELET # BLD AUTO: 340 10E9/L (ref 150–450)
PROT UR-MCNC: <0.05 G/L
PROT/CREAT 24H UR: NORMAL G/G CR (ref 0–0.2)
RBC # BLD AUTO: 3.08 10E12/L (ref 3.8–5.2)
WBC # BLD AUTO: 10.9 10E9/L (ref 4–11)

## 2021-04-05 PROCEDURE — 84156 ASSAY OF PROTEIN URINE: CPT | Performed by: OBSTETRICS & GYNECOLOGY

## 2021-04-05 PROCEDURE — 84450 TRANSFERASE (AST) (SGOT): CPT | Performed by: OBSTETRICS & GYNECOLOGY

## 2021-04-05 PROCEDURE — 85027 COMPLETE CBC AUTOMATED: CPT | Performed by: OBSTETRICS & GYNECOLOGY

## 2021-04-05 PROCEDURE — 82565 ASSAY OF CREATININE: CPT | Performed by: OBSTETRICS & GYNECOLOGY

## 2021-04-05 PROCEDURE — 84460 ALANINE AMINO (ALT) (SGPT): CPT | Performed by: OBSTETRICS & GYNECOLOGY

## 2021-04-05 PROCEDURE — 36415 COLL VENOUS BLD VENIPUNCTURE: CPT | Performed by: OBSTETRICS & GYNECOLOGY

## 2021-04-05 PROCEDURE — 99212 OFFICE O/P EST SF 10 MIN: CPT | Mod: 24 | Performed by: OBSTETRICS & GYNECOLOGY

## 2021-04-05 RX ORDER — SUMATRIPTAN 50 MG/1
50-100 TABLET, FILM COATED ORAL
Qty: 30 TABLET | Refills: 0 | Status: SHIPPED | OUTPATIENT
Start: 2021-04-05 | End: 2022-10-17
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
## 2021-04-11 NOTE — PROGRESS NOTES
Acute Postpartum Visit Nte  4/12/2021    I attempted to call patient for acute postpartum visit to check on her Blood pressure and headaches as follow-up from recent visit.      Attempt #1: 4/12/2021 @ 12:34 PM: Could not leave message as mailbox full  Attempt #2: 4/12/2021 @ 12:45 PM: Could not leave message as mailbox full    Will send MyChart message to check in with patient and see if we need to reschedule or see if she is doing well.    Dr. Valentin

## 2021-04-12 ENCOUNTER — VIRTUAL VISIT (OUTPATIENT)
Dept: OBGYN | Facility: CLINIC | Age: 31
End: 2021-04-12
Attending: OBSTETRICS & GYNECOLOGY
Payer: COMMERCIAL

## 2021-04-12 DIAGNOSIS — Z53.9 ERRONEOUS ENCOUNTER--DISREGARD: Primary | ICD-10-CM

## 2021-04-12 PROCEDURE — 99207 PR NO BILLABLE SERVICE THIS VISIT: CPT | Mod: TEL | Performed by: OBSTETRICS & GYNECOLOGY

## 2021-04-12 NOTE — LETTER
4/12/2021       RE: Nova Matthews  [address]  [place] [facility] [address]     Dear Colleague,    Thank you for referring your patient, Nova Matthews, to the [facility] [place] at [facility]. Please see a copy of my visit note below.    Acute Postpartum Visit Nte  4/12/2021    I attempted to call patient for acute postpartum visit to check on her Blood pressure and headaches as follow-up from recent visit.      Attempt #1: 4/12/2021 @ 12:34 PM: Could not leave message as mailbox full  Attempt #2: 4/12/2021 @ 12:45 PM: Could not leave message as mailbox full    Will send [employer] [employer] message to check in with patient and see if we need to reschedule or see if she is doing well.    Dr. Valentin        Again, thank you for allowing me to participate in the care of your patient.      Sincerely,    Dorothea Valentin MD

## 2021-04-12 NOTE — LETTER
Date:April 21, 2021      Provider requested that no letter be sent. Do not send.       [facility]
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
## 2021-05-17 ENCOUNTER — OFFICE VISIT (OUTPATIENT)
Dept: OBGYN | Facility: CLINIC | Age: 31
End: 2021-05-17
Attending: OBSTETRICS & GYNECOLOGY
Payer: COMMERCIAL

## 2021-05-17 VITALS
BODY MASS INDEX: 23.45 KG/M2 | HEIGHT: 68 IN | SYSTOLIC BLOOD PRESSURE: 94 MMHG | HEART RATE: 70 BPM | WEIGHT: 154.7 LBS | DIASTOLIC BLOOD PRESSURE: 63 MMHG

## 2021-05-17 DIAGNOSIS — Z87.59 HISTORY OF GESTATIONAL HYPERTENSION: ICD-10-CM

## 2021-05-17 DIAGNOSIS — Z12.4 SCREENING FOR MALIGNANT NEOPLASM OF CERVIX: ICD-10-CM

## 2021-05-17 PROBLEM — Z36.89 ENCOUNTER FOR TRIAGE IN PREGNANT PATIENT: Status: RESOLVED | Noted: 2021-03-29 | Resolved: 2021-05-17

## 2021-05-17 PROBLEM — O60.00 PRETERM LABOR: Status: RESOLVED | Noted: 2021-03-29 | Resolved: 2021-05-17

## 2021-05-17 PROBLEM — O09.90 HIGH-RISK PREGNANCY, UNSPECIFIED TRIMESTER: Status: RESOLVED | Noted: 2020-09-28 | Resolved: 2021-05-17

## 2021-05-17 PROBLEM — O43.122 VELAMENTOUS INSERTION OF UMBILICAL CORD IN SECOND TRIMESTER: Status: RESOLVED | Noted: 2020-09-28 | Resolved: 2021-05-17
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
## 2021-05-17 PROCEDURE — G0463 HOSPITAL OUTPT CLINIC VISIT: HCPCS

## 2021-05-17 PROCEDURE — 87624 HPV HI-RISK TYP POOLED RSLT: CPT | Performed by: OBSTETRICS & GYNECOLOGY

## 2021-05-17 PROCEDURE — 99207 PR POST PARTUM EXAM: CPT | Performed by: OBSTETRICS & GYNECOLOGY

## 2021-05-17 PROCEDURE — G0145 SCR C/V CYTO,THINLAYER,RESCR: HCPCS | Performed by: OBSTETRICS & GYNECOLOGY

## 2021-05-17 RX ORDER — POLYETHYLENE GLYCOL 3350 17 G/17G
1 POWDER, FOR SOLUTION ORAL DAILY
COMMUNITY
End: 2022-10-17

## 2021-05-17 ASSESSMENT — ANXIETY QUESTIONNAIRES
3. WORRYING TOO MUCH ABOUT DIFFERENT THINGS: NOT AT ALL
GAD7 TOTAL SCORE: 3
1. FEELING NERVOUS, ANXIOUS, OR ON EDGE: SEVERAL DAYS
6. BECOMING EASILY ANNOYED OR IRRITABLE: SEVERAL DAYS
2. NOT BEING ABLE TO STOP OR CONTROL WORRYING: SEVERAL DAYS
7. FEELING AFRAID AS IF SOMETHING AWFUL MIGHT HAPPEN: NOT AT ALL
5. BEING SO RESTLESS THAT IT IS HARD TO SIT STILL: NOT AT ALL

## 2021-05-17 ASSESSMENT — MIFFLIN-ST. JEOR: SCORE: 1465.21

## 2021-05-17 ASSESSMENT — PATIENT HEALTH QUESTIONNAIRE - PHQ9
5. POOR APPETITE OR OVEREATING: NOT AT ALL
SUM OF ALL RESPONSES TO PHQ QUESTIONS 1-9: 3
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
## 2021-05-17 NOTE — NURSING NOTE
SUBJECTIVE:   Nova Matthews is here for her 6-week postpartum checkup.     PHQ-9 score: 3  Hx of Abuse:  No    Delivery Date: 3/30/21.    Delivering provider:  Fadia Aguirre MD.    Type of delivery:  .  Perineum:  tear, no stitches.     Delivery complications: None  Infant gender:  boy, weight 7 pounds 11 oz.  Feeding Method:  .  Complications reported with feeding:  none, infant thriving .    Bleeding:  None.  Duration:  3 weeks.  Menses resumed:  No  Bowel/Urinary problems:  Yes     Contraception Planned:  None -- is she planning pregnancy? no  She  has not had intercourse since delivery..

## 2021-05-18 ASSESSMENT — ANXIETY QUESTIONNAIRES: GAD7 TOTAL SCORE: 3

## 2021-05-21 LAB
COPATH REPORT: NORMAL
PAP: NORMAL

## 2021-05-24 LAB
FINAL DIAGNOSIS: NORMAL
HPV HR 12 DNA CVX QL NAA+PROBE: NEGATIVE
HPV16 DNA SPEC QL NAA+PROBE: NEGATIVE
HPV18 DNA SPEC QL NAA+PROBE: NEGATIVE
SPECIMEN DESCRIPTION: NORMAL
SPECIMEN SOURCE CVX/VAG CYTO: NORMAL

## 2021-05-25 ENCOUNTER — RECORDS - HEALTHEAST (OUTPATIENT)
Dept: ADMINISTRATIVE | Facility: CLINIC | Age: 31
End: 2021-05-25

## 2021-05-26 ENCOUNTER — RECORDS - HEALTHEAST (OUTPATIENT)
Dept: ADMINISTRATIVE | Facility: CLINIC | Age: 31
End: 2021-05-26

## 2021-10-09 ENCOUNTER — HEALTH MAINTENANCE LETTER (OUTPATIENT)
Age: 31
End: 2021-10-09

## 2022-01-29 ENCOUNTER — HEALTH MAINTENANCE LETTER (OUTPATIENT)
Age: 32
End: 2022-01-29

## 2022-08-03 ENCOUNTER — TRANSFERRED RECORDS (OUTPATIENT)
Dept: OBGYN | Facility: CLINIC | Age: 32
End: 2022-08-03

## 2022-08-24 ENCOUNTER — TRANSFERRED RECORDS (OUTPATIENT)
Dept: HEALTH INFORMATION MANAGEMENT | Facility: CLINIC | Age: 32
End: 2022-08-24

## 2022-08-30 PROBLEM — R87.610 ASCUS OF CERVIX WITH NEGATIVE HIGH RISK HPV: Status: ACTIVE | Noted: 2017-04-26

## 2022-08-30 PROBLEM — Z78.9 CONCEIVED BY IN VITRO FERTILIZATION: Status: RESOLVED | Noted: 2020-09-28 | Resolved: 2022-08-30

## 2022-08-31 LAB
ABO/RH(D): NORMAL
ANTIBODY SCREEN: NEGATIVE
SPECIMEN EXPIRATION DATE: NORMAL

## 2022-09-01 ENCOUNTER — OFFICE VISIT (OUTPATIENT)
Dept: OBGYN | Facility: CLINIC | Age: 32
End: 2022-09-01
Attending: REGISTERED NURSE
Payer: COMMERCIAL

## 2022-09-01 VITALS
HEIGHT: 68 IN | WEIGHT: 139.4 LBS | DIASTOLIC BLOOD PRESSURE: 68 MMHG | BODY MASS INDEX: 21.13 KG/M2 | HEART RATE: 84 BPM | SYSTOLIC BLOOD PRESSURE: 108 MMHG

## 2022-09-01 DIAGNOSIS — Z87.59 HISTORY OF POSTPARTUM HEMORRHAGE: ICD-10-CM

## 2022-09-01 DIAGNOSIS — Z87.59 HISTORY OF GESTATIONAL HYPERTENSION: ICD-10-CM

## 2022-09-01 DIAGNOSIS — O09.819 HIGH RISK PREGNANCY DUE TO ASSISTED REPRODUCTIVE TECHNOLOGY: ICD-10-CM

## 2022-09-01 DIAGNOSIS — O30.001 TWIN GESTATION IN FIRST TRIMESTER, UNSPECIFIED MULTIPLE GESTATION TYPE: Primary | ICD-10-CM

## 2022-09-01 PROBLEM — O30.009 TWIN PREGNANCY: Status: ACTIVE | Noted: 2022-09-01

## 2022-09-01 PROCEDURE — 86787 VARICELLA-ZOSTER ANTIBODY: CPT | Performed by: REGISTERED NURSE

## 2022-09-01 PROCEDURE — 86780 TREPONEMA PALLIDUM: CPT | Performed by: REGISTERED NURSE

## 2022-09-01 PROCEDURE — 86644 CMV ANTIBODY: CPT | Performed by: REGISTERED NURSE

## 2022-09-01 PROCEDURE — 87340 HEPATITIS B SURFACE AG IA: CPT | Performed by: REGISTERED NURSE

## 2022-09-01 PROCEDURE — 99207 PR PRENATAL VISIT: CPT | Performed by: REGISTERED NURSE

## 2022-09-01 PROCEDURE — 87086 URINE CULTURE/COLONY COUNT: CPT | Performed by: REGISTERED NURSE

## 2022-09-01 PROCEDURE — 86645 CMV ANTIBODY IGM: CPT | Performed by: REGISTERED NURSE

## 2022-09-01 PROCEDURE — 86850 RBC ANTIBODY SCREEN: CPT | Performed by: REGISTERED NURSE

## 2022-09-01 PROCEDURE — 86706 HEP B SURFACE ANTIBODY: CPT | Performed by: REGISTERED NURSE

## 2022-09-01 PROCEDURE — 87389 HIV-1 AG W/HIV-1&-2 AB AG IA: CPT | Performed by: REGISTERED NURSE

## 2022-09-01 PROCEDURE — 86762 RUBELLA ANTIBODY: CPT | Performed by: REGISTERED NURSE

## 2022-09-01 PROCEDURE — G0463 HOSPITAL OUTPT CLINIC VISIT: HCPCS

## 2022-09-01 PROCEDURE — 86901 BLOOD TYPING SEROLOGIC RH(D): CPT | Performed by: REGISTERED NURSE

## 2022-09-01 PROCEDURE — 86803 HEPATITIS C AB TEST: CPT | Performed by: REGISTERED NURSE

## 2022-09-01 PROCEDURE — 82306 VITAMIN D 25 HYDROXY: CPT | Performed by: REGISTERED NURSE

## 2022-09-01 RX ORDER — FOLIC ACID 1 MG/1
TABLET ORAL
COMMUNITY
Start: 2022-08-11 | End: 2023-01-12

## 2022-09-01 RX ORDER — ESTRADIOL 2 MG/1
TABLET ORAL
COMMUNITY
Start: 2022-08-11 | End: 2022-09-20

## 2022-09-01 RX ORDER — PROGESTERONE 100 MG/1
INSERT VAGINAL
COMMUNITY
Start: 2022-08-06 | End: 2022-09-20

## 2022-09-01 RX ORDER — PYRIDOXINE HCL (VITAMIN B6) 25 MG
25 TABLET ORAL DAILY
Qty: 90 TABLET | Refills: 3 | Status: ON HOLD | OUTPATIENT
Start: 2022-09-01 | End: 2023-03-25

## 2022-09-01 NOTE — LETTER
Date:September 3, 2022      Patient was self referred, no letter generated. Do not send.        [facility] Health Information
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
## 2022-09-02 ENCOUNTER — TELEPHONE (OUTPATIENT)
Dept: MATERNAL FETAL MEDICINE | Facility: CLINIC | Age: 32
End: 2022-09-02

## 2022-09-02 DIAGNOSIS — O30.001 TWIN GESTATION IN FIRST TRIMESTER, UNSPECIFIED MULTIPLE GESTATION TYPE: Primary | ICD-10-CM

## 2022-09-02 DIAGNOSIS — Z87.59 HISTORY OF POSTPARTUM HEMORRHAGE: ICD-10-CM

## 2022-09-02 DIAGNOSIS — Z87.59 HISTORY OF GESTATIONAL HYPERTENSION: ICD-10-CM

## 2022-09-02 PROBLEM — O09.91 SUPERVISION OF HIGH RISK PREGNANCY IN FIRST TRIMESTER: Status: ACTIVE | Noted: 2022-09-01

## 2022-09-02 NOTE — TELEPHONE ENCOUNTER
LOLA Bhatt to call [phone] regarding referral.     MFM requesting information:   -IVF records  -does pt desire genetic screening?    Dorothea Kevin RN

## 2022-09-03 LAB — BACTERIA UR CULT: NO GROWTH

## 2022-09-08 DIAGNOSIS — Z87.59 HISTORY OF POSTPARTUM HEMORRHAGE: ICD-10-CM

## 2022-09-08 DIAGNOSIS — Z78.9 CONCEIVED BY IN VITRO FERTILIZATION: ICD-10-CM

## 2022-09-08 DIAGNOSIS — Z87.59 HISTORY OF GESTATIONAL HYPERTENSION: ICD-10-CM

## 2022-09-08 DIAGNOSIS — O30.001 TWIN GESTATION IN FIRST TRIMESTER, UNSPECIFIED MULTIPLE GESTATION TYPE: Primary | ICD-10-CM

## 2022-09-13 DIAGNOSIS — O09.90 HIGH-RISK PREGNANCY, UNSPECIFIED TRIMESTER: Primary | ICD-10-CM

## 2022-09-17 ENCOUNTER — HEALTH MAINTENANCE LETTER (OUTPATIENT)
Age: 32
End: 2022-09-17

## 2022-09-20 ENCOUNTER — ANCILLARY PROCEDURE (OUTPATIENT)
Dept: ULTRASOUND IMAGING | Facility: CLINIC | Age: 32
End: 2022-09-20
Attending: OBSTETRICS & GYNECOLOGY
Payer: COMMERCIAL

## 2022-09-20 ENCOUNTER — OFFICE VISIT (OUTPATIENT)
Dept: OBGYN | Facility: CLINIC | Age: 32
End: 2022-09-20
Payer: COMMERCIAL

## 2022-09-20 VITALS
SYSTOLIC BLOOD PRESSURE: 111 MMHG | WEIGHT: 138.7 LBS | HEIGHT: 68 IN | HEART RATE: 67 BPM | DIASTOLIC BLOOD PRESSURE: 73 MMHG | BODY MASS INDEX: 21.02 KG/M2

## 2022-09-20 DIAGNOSIS — O09.90 HIGH-RISK PREGNANCY, UNSPECIFIED TRIMESTER: ICD-10-CM

## 2022-09-20 DIAGNOSIS — O09.90 HIGH-RISK PREGNANCY, UNSPECIFIED TRIMESTER: Primary | ICD-10-CM

## 2022-09-20 DIAGNOSIS — Z87.59 HISTORY OF GESTATIONAL HYPERTENSION: ICD-10-CM

## 2022-09-20 DIAGNOSIS — O30.041 DICHORIONIC DIAMNIOTIC TWIN PREGNANCY IN FIRST TRIMESTER: ICD-10-CM

## 2022-09-20 DIAGNOSIS — O21.9 NAUSEA AND VOMITING IN PREGNANCY: ICD-10-CM

## 2022-09-20 DIAGNOSIS — Z87.59 HISTORY OF POSTPARTUM HEMORRHAGE: ICD-10-CM

## 2022-09-20 PROCEDURE — 99207 PR PRENATAL VISIT: CPT | Performed by: OBSTETRICS & GYNECOLOGY

## 2022-09-20 PROCEDURE — 76801 OB US < 14 WKS SINGLE FETUS: CPT

## 2022-09-20 PROCEDURE — 76802 OB US < 14 WKS ADDL FETUS: CPT | Mod: 26 | Performed by: OBSTETRICS & GYNECOLOGY

## 2022-09-20 PROCEDURE — 76801 OB US < 14 WKS SINGLE FETUS: CPT | Mod: 26 | Performed by: OBSTETRICS & GYNECOLOGY

## 2022-09-20 PROCEDURE — G0463 HOSPITAL OUTPT CLINIC VISIT: HCPCS

## 2022-09-20 RX ORDER — METOCLOPRAMIDE 5 MG/1
5 TABLET ORAL 3 TIMES DAILY PRN
Qty: 30 TABLET | Refills: 1 | Status: SHIPPED | OUTPATIENT
Start: 2022-09-20 | End: 2022-10-17
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
## 2022-09-22 ENCOUNTER — PRE VISIT (OUTPATIENT)
Dept: MATERNAL FETAL MEDICINE | Facility: CLINIC | Age: 32
End: 2022-09-22

## 2022-09-29 ENCOUNTER — OFFICE VISIT (OUTPATIENT)
Dept: MATERNAL FETAL MEDICINE | Facility: CLINIC | Age: 32
End: 2022-09-29
Attending: OBSTETRICS & GYNECOLOGY
Payer: COMMERCIAL

## 2022-09-29 ENCOUNTER — HOSPITAL ENCOUNTER (OUTPATIENT)
Dept: ULTRASOUND IMAGING | Facility: CLINIC | Age: 32
Discharge: HOME | End: 2022-09-29
Attending: OBSTETRICS & GYNECOLOGY
Payer: COMMERCIAL

## 2022-09-29 ENCOUNTER — LAB (OUTPATIENT)
Dept: LAB | Facility: CLINIC | Age: 32
End: 2022-09-29
Attending: OBSTETRICS & GYNECOLOGY
Payer: COMMERCIAL

## 2022-09-29 ENCOUNTER — MEDICAL CORRESPONDENCE (OUTPATIENT)
Dept: MATERNAL FETAL MEDICINE | Facility: CLINIC | Age: 32
End: 2022-09-29

## 2022-09-29 DIAGNOSIS — Z87.59 HISTORY OF GESTATIONAL HYPERTENSION: ICD-10-CM

## 2022-09-29 DIAGNOSIS — Z78.9 CONCEIVED BY IN VITRO FERTILIZATION: ICD-10-CM

## 2022-09-29 DIAGNOSIS — O30.001 TWIN GESTATION IN FIRST TRIMESTER, UNSPECIFIED MULTIPLE GESTATION TYPE: ICD-10-CM

## 2022-09-29 DIAGNOSIS — O09.811 PREGNANCY RESULTING FROM IN VITRO FERTILIZATION IN FIRST TRIMESTER: ICD-10-CM

## 2022-09-29 DIAGNOSIS — Z87.59 HISTORY OF POSTPARTUM HEMORRHAGE: ICD-10-CM

## 2022-09-29 DIAGNOSIS — O30.041 TWIN PREGNANCY, DICHORIONIC/DIAMNIOTIC, FIRST TRIMESTER: Primary | ICD-10-CM

## 2022-09-29 DIAGNOSIS — O30.041 DICHORIONIC DIAMNIOTIC TWIN PREGNANCY IN FIRST TRIMESTER: ICD-10-CM

## 2022-09-29 DIAGNOSIS — Z36.9 ENCOUNTER FOR ANTENATAL SCREENING: Primary | ICD-10-CM

## 2022-09-29 DIAGNOSIS — Z36.9 ENCOUNTER FOR ANTENATAL SCREENING: ICD-10-CM

## 2022-09-29 PROCEDURE — 76813 OB US NUCHAL MEAS 1 GEST: CPT

## 2022-09-29 PROCEDURE — 96040 HC GENETIC COUNSELING, EACH 30 MINUTES: CPT | Performed by: GENETIC COUNSELOR, MS

## 2022-09-29 PROCEDURE — 76814 OB US NUCHAL MEAS ADD-ON: CPT | Mod: 26 | Performed by: OBSTETRICS & GYNECOLOGY

## 2022-09-29 PROCEDURE — 76813 OB US NUCHAL MEAS 1 GEST: CPT | Mod: 26 | Performed by: OBSTETRICS & GYNECOLOGY

## 2022-09-29 PROCEDURE — 36415 COLL VENOUS BLD VENIPUNCTURE: CPT

## 2022-09-29 NOTE — PROGRESS NOTES
[facility]  Genetic Counseling Consult    Patient: Nova Matthews YOB: 1990   Date of Service: 22      Nova Matthews was seen at [facility] for genetic consultation to discuss the options for screening and testing for fetal chromosome abnormalities. The patient was unaccompanied to today's visit.        Impression/Plan:   1.  This is an IVF di/di twin pregnancy. We reviewed screening and diagnostic testing options in the setting of a di/di twin gestation. Nova has elected to proceed with ultrasound and Panorama NIPT through [facility]. Results are expected within 1-2 weeks and will be available in [employer].  We will contact her to discuss the results, and a copy will be forwarded to the office of the referring OB provider. Nova requested that we do not leave results in her voicemail.     2.  Maternal serum AFP (single marker screen) is recommended after 15 weeks to screen for open neural tube defects. A quad screen should not be performed.    3.  Plan for level II ultrasound and fetal echocardiogram given IVF twin pregnancy.     Pregnancy History:   /Parity:    Age at Delivery: 32 year old  МАРИНА: 2023, by Other Basis  Gestational Age: 12w4d    No significant complications or exposures were reported in the current pregnancy.    This is a di/di twin gestation conceived by IVF using Nova's 31yo egg and her partner, Pro's sperm. Two frozen embryo's were transferred and PGT-A was reportedly not performed. We discussed that fetal echocardiograms are typically recommended for In Vitro Fertilization (IVF) pregnancies. The average pregnancy has a 0.5-1.0% chance of a congenital heart defect. However, studies suggest an increased chance for a heart defect for IVF pregnancies, with estimates ranging from 1-3.3%. Fetal echocardiograms are typically performed at 20 to 24 weeks gestation.      Nova lin pregnancy history is  significant for one term vaginal delivery complicated by PPH and gHTN.    Medical History:   Nova s reported medical history is not expected to impact pregnancy management or risks to fetal development.       Family History:   A three-generation pedigree was previously obtained, please see genetic counseling note from 10/05/2020. The family history was updated today and is scanned under the  Media  tab.     No new significant findings were reported by Nova. Otherwise, the reported family history is negative for multiple miscarriages, stillbirths, birth defects, intellectual disability, known genetic conditions, and consanguinity.       Carrier Screening:   Nova previously underwent Inheritest carrier screening with [facility], a copy of this result is located in the Media tab. 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 We reviewed that individuals with an intermediate repeat are not at increased risk to have a child with fragile X syndrome and are not at risk for symptoms that can affect premutation carriers such as FXTAS, FXPOI, and FXAND. Most intermediate alleles appear to be stable and do not expand significantly when passed on. However, in some cases repeats in the intermediate range may expand slightly when passed on to the next generation and therefore future generations could be at increased risk for symptoms related to premutation carriers or fragile X syndrome.     Nova's carrier screening also included cystic fibrosis and Nova was not identified to be a carrier for this condition, reducing the chance for the couple to have a child with cystic fibrosis. Nova has a paternal first cousin once removed who was reported to have cystic fibrosis. We cannot completely rule out the familial variants in Nova (to ensure that these genetic variants were included on her carrier screening) without a copy of this relatives report.        Risk Assessment  for Chromosome Conditions:   We explained that the risk for fetal chromosome abnormalities increases with maternal age. We discussed specific features of common chromosome abnormalities, including Down syndrome, trisomy 13, trisomy 18, and sex chromosome trisomies.    Based on age at egg retrieval, these estimates are for a single conception:     - At age 29 at midtrimester, the risk to have a baby with Down syndrome is 1 in 690.     - At age 29 at midtrimester, the risk to have a baby with any chromosome abnormality is 1 in 345.          Testing Options:   Dichorionic diamniotic twins have separate placentas and amniotic sacs and have an estimated 20% chance of being monozygotic though we reviewed the most likely scenario is that both embryos lead to a successful twin conception and in that case would not be expected to be monozygotic. We discussed the following options in the setting of a dichorionic diamniotic twin gestation:      Non-invasive Prenatal Testing (NIPT)    Maternal plasma cell-free DNA testing; first trimester ultrasound with nuchal translucency and nasal bone assessment is recommended, when appropriate    Screens for fetal trisomy 21, trisomy 13, trisomy 18, sex chromosomes, SCA not always available in multiple gestation    Cannot screen for open neural tube defects; maternal serum AFP after 15 weeks is recommended     Discussed benefits and limitations of MPSS platform (TagCashe NIPS) vs SNP platform (Melly Panorama) for dichorionic twin gestation including higher chance of failed analysis with Panorama and Nova has elected to proceed with Panorama testing through [facility] laboratory     Chorionic villus sampling (CVS)    Invasive procedure typically performed in the first trimester by which placental villi are obtained for the purpose of chromosome analysis and/or other prenatal genetic analysis    Diagnostic results; >99% sensitivity for fetal chromosome abnormalities    Cannot test for open  neural tube defects; maternal serum AFP after 15 weeks is recommended     Genetic Amniocentesis    Invasive procedure typically performed in the second trimester by which amniotic fluid is obtained for the purpose of chromosome analysis and/or other prenatal genetic analysis    Diagnostic results; >99% sensitivity for fetal chromosome abnormalities    AFAFP measurement tests for open neural tube defects     Comprehensive (Level II) ultrasound: Detailed ultrasound performed between 18-22 weeks gestation to screen for major birth defects and markers for aneuploidy.    Fetal echocardiogram is recommended to screen for congenital heart defects. Fetal echocardiogram cannot definitively diagnose or exclude the presence of all congenital heart defects      We reviewed the benefits and limitations of this testing.  Screening tests provide a risk assessment specific to the pregnancy for certain fetal chromosome abnormalities, but cannot definitively diagnose or exclude a fetal chromosome abnormality.  Follow-up genetic counseling and consideration of diagnostic testing is recommended with any abnormal screening result.     Diagnostic tests carry inherent risks- including risk of miscarriage- that require careful consideration.  These tests can detect fetal chromosome abnormalities with greater than 99% certainty.  Results can be compromised by maternal cell contamination or mosaicism, and are limited by the resolution of cytogenetic G-banding technology.  There is no screening nor diagnostic test that can detect all forms of birth defects or mental disability.     It was a pleasure to be involved with Nova lin [facility]. Face-to-face time of the meeting was 30 minutes.    Hawa Vora MS, [facility]  Licensed Genetic Counselor  [facility]  Maternal Fetal Medicine  Ph: [phone]  [email or web address]

## 2022-09-29 NOTE — PROGRESS NOTES
"Please see \"imaging\" tab under \"Chart Review\" for details of today's US at the [facility].    Luigi Garcia MD  Maternal-Fetal Medicine    "

## 2022-10-03 ENCOUNTER — LAB (OUTPATIENT)
Dept: LAB | Facility: CLINIC | Age: 32
End: 2022-10-03
Payer: COMMERCIAL

## 2022-10-03 DIAGNOSIS — O09.90 HIGH-RISK PREGNANCY, UNSPECIFIED TRIMESTER: ICD-10-CM

## 2022-10-03 LAB
ALBUMIN MFR UR ELPH: 12.9 MG/DL
ALT SERPL W P-5'-P-CCNC: 20 U/L (ref 0–50)
AST SERPL W P-5'-P-CCNC: 15 U/L (ref 0–45)
CREAT SERPL-MCNC: 0.45 MG/DL (ref 0.52–1.04)
CREAT UR-MCNC: 150 MG/DL
GFR SERPL CREATININE-BSD FRML MDRD: >90 ML/MIN/1.73M2
GLUCOSE 1H P 50 G GLC PO SERPL-MCNC: 86 MG/DL (ref 70–129)
PROT/CREAT 24H UR: 0.09 MG/MG CR (ref 0–0.2)

## 2022-10-03 PROCEDURE — 84156 ASSAY OF PROTEIN URINE: CPT

## 2022-10-03 PROCEDURE — 82950 GLUCOSE TEST: CPT

## 2022-10-03 PROCEDURE — 36415 COLL VENOUS BLD VENIPUNCTURE: CPT

## 2022-10-03 PROCEDURE — 82565 ASSAY OF CREATININE: CPT

## 2022-10-03 PROCEDURE — 84450 TRANSFERASE (AST) (SGOT): CPT

## 2022-10-03 PROCEDURE — 84460 ALANINE AMINO (ALT) (SGPT): CPT

## 2022-10-11 ENCOUNTER — MYC MEDICAL ADVICE (OUTPATIENT)
Dept: OBGYN | Facility: CLINIC | Age: 32
End: 2022-10-11

## 2022-10-11 ENCOUNTER — TELEPHONE (OUTPATIENT)
Dept: MATERNAL FETAL MEDICINE | Facility: CLINIC | Age: 32
End: 2022-10-11

## 2022-10-11 DIAGNOSIS — Z87.59 HISTORY OF POSTPARTUM HEMORRHAGE: ICD-10-CM

## 2022-10-11 DIAGNOSIS — O30.041 DICHORIONIC DIAMNIOTIC TWIN PREGNANCY IN FIRST TRIMESTER: Primary | ICD-10-CM

## 2022-10-11 DIAGNOSIS — Z87.59 HISTORY OF GESTATIONAL HYPERTENSION: ICD-10-CM

## 2022-10-11 LAB — SCANNED LAB RESULT: NORMAL

## 2022-10-11 NOTE — TELEPHONE ENCOUNTER
"October 11, 2022 3:08PM    Confirmed with [facility] billing and shared with Nova that she is not expected to receive a bill for the cost of her no call Panorama result. Any result that is no call or insufficient fetal DNA is not charged to the patient/insurance and is covered by [facility]. Nova verbalized understanding and had no further questions as of today's telephone call.         October 11, 2022 2:30PM    Nova returned my VM, we reviewed the information below from my first telephone documentation. Nova expressed concern with these results. Specifically, she shared that she was told the test she had (Panorama cell-free DNA) was more accurate than \"NIPT\", and that she was under the impression there would be a \"10% chance of not finding on the gender, not that there would be a chance the whole test would fail\" per her original genetic counseling consult from 9/29/22.     We discussed the Panorama test in detail during today's consult, and that with all NIPT tests, there are risks for no call/ failed results, albeit low. Specifically, I shared that [facility] specializes in performing NIPT in twin gestations, and that if the patient wishes, she can re-attempt this screening via [facility] (for free) or through a different testing company ([employer]). Nova declined both options today. I also reviewed that the option of diagnostic invasive testing is available to her for clarification of this no-call result, and Nova again declined today. She verbalized multiple times that she was only interested in obtaining information about predicted fetal sexes via Panorama, and does not desire other information from this screen. She has requested I contact [facility] to determine if she will receive a bill for the cost of this test \"eventhough they weren't able to get a result\". I will reach out to [facility] billing and follow-up with Nova. Nova has also requested that genetic counselor Hawa Vora reach out to her on Monday, 10/17/22. This " "request was communicated to Hawa Vora.         October 11, 2022    Called Nova and left a vague voicemail asking her to contact me at her earliest convenience today to review results. I shared that if we are unable to speak today, that one of my colleagues would attempt reaching the patient tomorrow, 10/12/22.     Of note, the patient's Panorama cell-free DNA screening results have returned from [facility]. Nova received a \"no result\" on testing due to insufficient fetal DNA. I spoke with genetic counselor Kyleigh at [facility], who shared that the lab's borderline low fetal fraction ranges from 2.8%-7%, and with the poor quality metrics from this sample, it appeared that it failed internal analysis. Because the fetal fractions were above 1.8%, [facility] could confidently comment on the zygosity of the twins (dizygotic). Per [facility] genetic counselor Hawa Payton's original genetic counseling note from 9/29/22, it appears that the risk for a no call result was reviewed with Nova during the pre-test consenting process for [facility].    Kyleigh also shared that should the patient elect a re-draw, she has about an 87% chance probability of obtaining a result with her second blood draw. It may be slightly lower since she is currently pregnant with twins, however the patient is likely to follow the trends listed on the table of page two of the patient's Panorama report regarding re-draw rates. Additionally, Kyleigh did share that this no call result cannot rule out the possibility of fetal aneuploidy, and that diagnostic testing should still be offered to the patient.     All of the above will be shared with Nova once [facility] genetic counseling can reach her. A copy of her results can be accessed via the laboratory tab under \"laboratory MISC\" from 9/29/22.    Monica Clements MS, [facility]  Genetic Counselor  [facility]  Maternal Fetal Medicine  Ph: [phone]   Pager: [phone]    "

## 2022-10-17 ENCOUNTER — TELEPHONE (OUTPATIENT)
Dept: MATERNAL FETAL MEDICINE | Facility: CLINIC | Age: 32
End: 2022-10-17

## 2022-10-17 ENCOUNTER — OFFICE VISIT (OUTPATIENT)
Dept: OBGYN | Facility: CLINIC | Age: 32
End: 2022-10-17
Payer: COMMERCIAL

## 2022-10-17 VITALS
HEIGHT: 68 IN | SYSTOLIC BLOOD PRESSURE: 96 MMHG | BODY MASS INDEX: 22.43 KG/M2 | DIASTOLIC BLOOD PRESSURE: 65 MMHG | HEART RATE: 70 BPM | WEIGHT: 148 LBS

## 2022-10-17 DIAGNOSIS — O21.9 NAUSEA AND VOMITING IN PREGNANCY: ICD-10-CM

## 2022-10-17 DIAGNOSIS — Z87.59 HISTORY OF GESTATIONAL HYPERTENSION: ICD-10-CM

## 2022-10-17 DIAGNOSIS — Z87.59 HISTORY OF POSTPARTUM HEMORRHAGE: ICD-10-CM

## 2022-10-17 DIAGNOSIS — O30.041 DICHORIONIC DIAMNIOTIC TWIN PREGNANCY IN FIRST TRIMESTER: ICD-10-CM

## 2022-10-17 DIAGNOSIS — O09.91 SUPERVISION OF HIGH RISK PREGNANCY IN FIRST TRIMESTER: Primary | ICD-10-CM

## 2022-10-17 PROCEDURE — 99207 PR PRENATAL VISIT: CPT | Performed by: OBSTETRICS & GYNECOLOGY

## 2022-10-17 PROCEDURE — G0463 HOSPITAL OUTPT CLINIC VISIT: HCPCS

## 2022-10-17 RX ORDER — FOLIC ACID 1 MG/1
1 TABLET ORAL DAILY
Qty: 90 TABLET | Refills: 3 | Status: ON HOLD | OUTPATIENT
Start: 2022-10-17 | End: 2023-03-25

## 2022-10-17 RX ORDER — METOCLOPRAMIDE 5 MG/1
5 TABLET ORAL 3 TIMES DAILY PRN
Qty: 30 TABLET | Refills: 1 | Status: ON HOLD | OUTPATIENT
Start: 2022-10-17 | End: 2023-03-25

## 2022-10-17 ASSESSMENT — PAIN SCALES - GENERAL: PAINLEVEL: NO PAIN (0)

## 2022-10-17 NOTE — NURSING NOTE
Chief Complaint   Patient presents with     Prenatal Care     NEDA 15 weeks and 1 day   Saundra Mcnally LPN

## 2022-10-17 NOTE — TELEPHONE ENCOUNTER
October 17, 2022    I attempted to reach Nova as she requested to further review information regarding screening in the current twin pregnancy. She attempted to undergo Panorama NIPT which resulted in a no call result due to insufficient fetal DNA which was reviewed with her on 10/11 with ROLANDO Anderson. During our initial genetic counseling consultation we had reviewed benefits and limitations of first trimester screen, NIPT (through [employer] or [facility] platform), or diagnostic testing in the setting of a di/di twin gestation. Left my direct contact information to return my call to discuss further if desired.     Hawa Vora MS, [facility]  Licensed Genetic Counselor  [facility]  Maternal Fetal Medicine  Ph: [phone]  [email or web address]
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
## 2022-11-10 ENCOUNTER — OFFICE VISIT (OUTPATIENT)
Dept: MATERNAL FETAL MEDICINE | Facility: CLINIC | Age: 32
End: 2022-11-10
Attending: OBSTETRICS & GYNECOLOGY
Payer: COMMERCIAL

## 2022-11-10 ENCOUNTER — HOSPITAL ENCOUNTER (OUTPATIENT)
Dept: ULTRASOUND IMAGING | Facility: CLINIC | Age: 32
Discharge: HOME | End: 2022-11-10
Attending: OBSTETRICS & GYNECOLOGY
Payer: COMMERCIAL

## 2022-11-10 DIAGNOSIS — O30.041 TWIN PREGNANCY, DICHORIONIC/DIAMNIOTIC, FIRST TRIMESTER: ICD-10-CM

## 2022-11-10 DIAGNOSIS — Z87.59 HISTORY OF GESTATIONAL HYPERTENSION: ICD-10-CM

## 2022-11-10 DIAGNOSIS — O30.042 DICHORIONIC DIAMNIOTIC TWIN PREGNANCY IN SECOND TRIMESTER: Primary | ICD-10-CM

## 2022-11-10 DIAGNOSIS — Z78.9 CONCEIVED BY IN VITRO FERTILIZATION: ICD-10-CM

## 2022-11-10 DIAGNOSIS — Z87.59 HISTORY OF POSTPARTUM HEMORRHAGE: ICD-10-CM

## 2022-11-10 PROCEDURE — 76812 OB US DETAILED ADDL FETUS: CPT

## 2022-11-10 PROCEDURE — 76811 OB US DETAILED SNGL FETUS: CPT | Mod: 26 | Performed by: OBSTETRICS & GYNECOLOGY

## 2022-11-10 PROCEDURE — 76812 OB US DETAILED ADDL FETUS: CPT | Mod: 26 | Performed by: OBSTETRICS & GYNECOLOGY

## 2022-11-10 NOTE — PROGRESS NOTES
The patient was seen for an ultrasound in the Maternal-Fetal Medicine Center at the [facility] today.  For a detailed report of the ultrasound examination, please see the ultrasound report which can be found under the imaging tab.    Raisa Damon MD  , OB/GYN  Maternal-Fetal Medicine  [phone] (Pager)
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
## 2022-11-17 ENCOUNTER — OFFICE VISIT (OUTPATIENT)
Dept: OBGYN | Facility: CLINIC | Age: 32
End: 2022-11-17
Payer: COMMERCIAL

## 2022-11-17 VITALS
DIASTOLIC BLOOD PRESSURE: 72 MMHG | HEART RATE: 69 BPM | SYSTOLIC BLOOD PRESSURE: 104 MMHG | WEIGHT: 156 LBS | HEIGHT: 68 IN | BODY MASS INDEX: 23.64 KG/M2

## 2022-11-17 DIAGNOSIS — O30.041 DICHORIONIC DIAMNIOTIC TWIN PREGNANCY IN FIRST TRIMESTER: Primary | ICD-10-CM

## 2022-11-17 DIAGNOSIS — Z87.59 HISTORY OF GESTATIONAL HYPERTENSION: ICD-10-CM

## 2022-11-17 DIAGNOSIS — Z87.59 HISTORY OF POSTPARTUM HEMORRHAGE: ICD-10-CM

## 2022-11-17 PROCEDURE — 99207 PR PRENATAL VISIT: CPT | Performed by: OBSTETRICS & GYNECOLOGY

## 2022-11-17 PROCEDURE — G0463 HOSPITAL OUTPT CLINIC VISIT: HCPCS

## 2022-12-08 ENCOUNTER — OFFICE VISIT (OUTPATIENT)
Dept: MATERNAL FETAL MEDICINE | Facility: CLINIC | Age: 32
End: 2022-12-08
Attending: OBSTETRICS & GYNECOLOGY
Payer: COMMERCIAL

## 2022-12-08 ENCOUNTER — HOSPITAL ENCOUNTER (OUTPATIENT)
Dept: ULTRASOUND IMAGING | Facility: CLINIC | Age: 32
Discharge: HOME | End: 2022-12-08
Attending: OBSTETRICS & GYNECOLOGY
Payer: COMMERCIAL

## 2022-12-08 DIAGNOSIS — O30.042 DICHORIONIC DIAMNIOTIC TWIN PREGNANCY IN SECOND TRIMESTER: Primary | ICD-10-CM

## 2022-12-08 DIAGNOSIS — O30.042 DICHORIONIC DIAMNIOTIC TWIN PREGNANCY IN SECOND TRIMESTER: ICD-10-CM

## 2022-12-08 DIAGNOSIS — Z87.59 HISTORY OF GESTATIONAL HYPERTENSION: ICD-10-CM

## 2022-12-08 PROCEDURE — 76816 OB US FOLLOW-UP PER FETUS: CPT

## 2022-12-08 PROCEDURE — 76816 OB US FOLLOW-UP PER FETUS: CPT | Mod: 26 | Performed by: OBSTETRICS & GYNECOLOGY

## 2022-12-08 NOTE — PROGRESS NOTES
Please see the imaging tab for details of the ultrasound performed today.    Verona Dukes MD  Specialist in Maternal-Fetal Medicine
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
## 2022-12-12 ENCOUNTER — OFFICE VISIT (OUTPATIENT)
Dept: OBGYN | Facility: CLINIC | Age: 32
End: 2022-12-12
Payer: COMMERCIAL

## 2022-12-12 VITALS
BODY MASS INDEX: 24.48 KG/M2 | HEART RATE: 76 BPM | HEIGHT: 68 IN | WEIGHT: 161.5 LBS | DIASTOLIC BLOOD PRESSURE: 72 MMHG | SYSTOLIC BLOOD PRESSURE: 107 MMHG

## 2022-12-12 DIAGNOSIS — O30.042 DICHORIONIC DIAMNIOTIC TWIN PREGNANCY IN SECOND TRIMESTER: Primary | ICD-10-CM

## 2022-12-12 DIAGNOSIS — Z87.59 HISTORY OF POSTPARTUM HEMORRHAGE: ICD-10-CM

## 2022-12-12 DIAGNOSIS — Z87.59 HISTORY OF GESTATIONAL HYPERTENSION: ICD-10-CM

## 2022-12-12 PROCEDURE — 99207 PR PRENATAL VISIT: CPT | Performed by: OBSTETRICS & GYNECOLOGY

## 2022-12-12 PROCEDURE — G0463 HOSPITAL OUTPT CLINIC VISIT: HCPCS

## 2022-12-12 PROCEDURE — G0463 HOSPITAL OUTPT CLINIC VISIT: HCPCS | Performed by: OBSTETRICS & GYNECOLOGY

## 2022-12-13 ENCOUNTER — OFFICE VISIT (OUTPATIENT)
Dept: CARDIOLOGY | Facility: CLINIC | Age: 32
End: 2022-12-13

## 2022-12-13 ENCOUNTER — HOSPITAL ENCOUNTER (OUTPATIENT)
Dept: CARDIOLOGY | Facility: CLINIC | Age: 32
Discharge: HOME | End: 2022-12-13
Attending: OBSTETRICS & GYNECOLOGY
Payer: COMMERCIAL

## 2022-12-13 DIAGNOSIS — Z78.9 CONCEIVED BY IN VITRO FERTILIZATION: ICD-10-CM

## 2022-12-13 DIAGNOSIS — O30.041 TWIN PREGNANCY, DICHORIONIC/DIAMNIOTIC, FIRST TRIMESTER: ICD-10-CM

## 2022-12-13 DIAGNOSIS — O35.BXX2 FETAL CARDIAC DISEASE AFFECTING PREGNANCY, FETUS 2 OF MULTIPLE GESTATION: ICD-10-CM

## 2022-12-13 DIAGNOSIS — O35.BXX1 FETAL CARDIAC DISEASE AFFECTING PREGNANCY, FETUS 1 OF MULTIPLE GESTATION: Primary | ICD-10-CM

## 2022-12-13 PROCEDURE — 76827 ECHO EXAM OF FETAL HEART: CPT | Mod: 26 | Performed by: PEDIATRICS

## 2022-12-13 PROCEDURE — 76825 ECHO EXAM OF FETAL HEART: CPT | Mod: 26 | Performed by: PEDIATRICS

## 2022-12-13 PROCEDURE — 93325 DOPPLER ECHO COLOR FLOW MAPG: CPT | Mod: 26 | Performed by: PEDIATRICS

## 2022-12-13 PROCEDURE — 93325 DOPPLER ECHO COLOR FLOW MAPG: CPT

## 2022-12-13 PROCEDURE — 99203 OFFICE O/P NEW LOW 30 MIN: CPT | Mod: 25 | Performed by: PEDIATRICS

## 2022-12-13 NOTE — PROGRESS NOTES
[facility]   [facility] Center Fetal Consult Note    Patient:  Nova Matthews MRN:  [id number]   YOB: 1990 Age:  32 year old   Date of Visit:  2022 PCP:  No Ref-Primary, Physician     Dear Doctor,     I had the pleasure of seeing Nova Matthews at the [facility] on 2022 in fetal cardiology consultation for fetal echocardiogram. She presented today accompanied by herself. As you know, she is a 32 year old  at 23w2d who presented for fetal echocardiogram today because of dichorionic twin gestation and IVF pregnancy.    I performed and interpreted the fetal echocardiogram today, which demonstrated:     Fetus 1. Normal fetal cardiac anatomy. Normal fetal intracardiac connections. Normal right and left ventricular size and function. No hydrops.     Fetus 2. Small, mid-muscular ventricular septal defect. Normal right and left ventricular size and systolic function. No hydrops.     With the help of diagrams, I reviewed the echo findings today with Nova Matthews and her partner via speaker phone. I discussed the fetal cardiac anatomy, function, and physiology. I anticipate that the small mid-muscular ventricular septal defect will be clinically insignificant and will likely spontaneously close. I reviewed the importance of a post- transthoracic echocardiogram to confirm these findings and help direct management. She had appropriate questions which I addressed. I provided her with my direct contact information for additional questions after they left.    Plan:    1. No follow up fetal echocardiogram  2. Transthoracic echocardiogram to be obtained within 1-2 months of life with an outpatient cardiology visit.     Thank you for allowing me to participate in Nova's care. Please do not hesitate to contact me with questions or concerns.    This visit was separate from the performance and interpretation of the ultrasound. The majority of the time  (>50%) was spent in counseling and coordination of care. I spent approximately 40 minutes in face-to-face time reviewing the above considerations.    Aniket Castro M.D.  Pediatric Cardiology  [facility]'s [address], 5th floor, [place] [address]  Phone [phone]  Fax [phone]

## 2023-01-10 ENCOUNTER — TELEPHONE (OUTPATIENT)
Dept: OBGYN | Facility: CLINIC | Age: 33
End: 2023-01-10

## 2023-01-10 DIAGNOSIS — O09.91 SUPERVISION OF HIGH RISK PREGNANCY IN FIRST TRIMESTER: Primary | ICD-10-CM
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
## 2023-01-12 ENCOUNTER — OFFICE VISIT (OUTPATIENT)
Dept: OBGYN | Facility: CLINIC | Age: 33
End: 2023-01-12
Attending: OBSTETRICS & GYNECOLOGY
Payer: COMMERCIAL

## 2023-01-12 ENCOUNTER — APPOINTMENT (OUTPATIENT)
Dept: LAB | Facility: CLINIC | Age: 33
End: 2023-01-12
Attending: OBSTETRICS & GYNECOLOGY
Payer: COMMERCIAL

## 2023-01-12 ENCOUNTER — TRANSCRIBE ORDERS (OUTPATIENT)
Dept: OBGYN | Facility: CLINIC | Age: 33
End: 2023-01-12

## 2023-01-12 ENCOUNTER — ANCILLARY PROCEDURE (OUTPATIENT)
Dept: ULTRASOUND IMAGING | Facility: CLINIC | Age: 33
End: 2023-01-12
Attending: OBSTETRICS & GYNECOLOGY
Payer: COMMERCIAL

## 2023-01-12 ENCOUNTER — TRANSCRIBE ORDERS (OUTPATIENT)
Dept: MATERNAL FETAL MEDICINE | Facility: CLINIC | Age: 33
End: 2023-01-12

## 2023-01-12 VITALS
SYSTOLIC BLOOD PRESSURE: 119 MMHG | DIASTOLIC BLOOD PRESSURE: 77 MMHG | HEART RATE: 81 BPM | WEIGHT: 165 LBS | BODY MASS INDEX: 25.01 KG/M2 | HEIGHT: 68 IN

## 2023-01-12 DIAGNOSIS — O30.043 DICHORIONIC DIAMNIOTIC TWIN PREGNANCY IN THIRD TRIMESTER: Primary | ICD-10-CM

## 2023-01-12 DIAGNOSIS — O26.90 PREGNANCY RELATED CONDITION, ANTEPARTUM: Primary | ICD-10-CM

## 2023-01-12 DIAGNOSIS — Z87.59 HISTORY OF GESTATIONAL HYPERTENSION: ICD-10-CM

## 2023-01-12 DIAGNOSIS — O09.91 SUPERVISION OF HIGH RISK PREGNANCY IN FIRST TRIMESTER: ICD-10-CM

## 2023-01-12 LAB
ALT SERPL W P-5'-P-CCNC: 15 U/L (ref 0–50)
AST SERPL W P-5'-P-CCNC: 15 U/L (ref 0–45)
CREAT SERPL-MCNC: 0.44 MG/DL (ref 0.52–1.04)
CREAT UR-MCNC: 21 MG/DL
DEPRECATED CALCIDIOL+CALCIFEROL SERPL-MC: 47 UG/L (ref 20–75)
ERYTHROCYTE [DISTWIDTH] IN BLOOD BY AUTOMATED COUNT: 12.5 % (ref 10–15)
GFR SERPL CREATININE-BSD FRML MDRD: >90 ML/MIN/1.73M2
GLUCOSE 1H P 50 G GLC PO SERPL-MCNC: 125 MG/DL (ref 70–129)
HCT VFR BLD AUTO: 35.1 % (ref 35–47)
HGB BLD-MCNC: 11.7 G/DL (ref 11.7–15.7)
MCH RBC QN AUTO: 30 PG (ref 26.5–33)
MCHC RBC AUTO-ENTMCNC: 33.3 G/DL (ref 31.5–36.5)
MCV RBC AUTO: 90 FL (ref 78–100)
PLATELET # BLD AUTO: 262 10E3/UL (ref 150–450)
PROT UR-MCNC: <0.05 G/L
PROT/CREAT 24H UR: NORMAL MG/G{CREAT}
RBC # BLD AUTO: 3.9 10E6/UL (ref 3.8–5.2)
T PALLIDUM AB SER QL: NONREACTIVE
WBC # BLD AUTO: 11.5 10E3/UL (ref 4–11)

## 2023-01-12 PROCEDURE — 90715 TDAP VACCINE 7 YRS/> IM: CPT

## 2023-01-12 PROCEDURE — 84156 ASSAY OF PROTEIN URINE: CPT | Performed by: OBSTETRICS & GYNECOLOGY

## 2023-01-12 PROCEDURE — 36415 COLL VENOUS BLD VENIPUNCTURE: CPT | Performed by: OBSTETRICS & GYNECOLOGY

## 2023-01-12 PROCEDURE — 82565 ASSAY OF CREATININE: CPT | Performed by: OBSTETRICS & GYNECOLOGY

## 2023-01-12 PROCEDURE — 99207 PR PRENATAL VISIT: CPT | Mod: GC | Performed by: OBSTETRICS & GYNECOLOGY

## 2023-01-12 PROCEDURE — 82306 VITAMIN D 25 HYDROXY: CPT | Performed by: OBSTETRICS & GYNECOLOGY

## 2023-01-12 PROCEDURE — 76816 OB US FOLLOW-UP PER FETUS: CPT | Mod: 26 | Performed by: OBSTETRICS & GYNECOLOGY

## 2023-01-12 PROCEDURE — 85027 COMPLETE CBC AUTOMATED: CPT | Performed by: OBSTETRICS & GYNECOLOGY

## 2023-01-12 PROCEDURE — 86780 TREPONEMA PALLIDUM: CPT | Performed by: OBSTETRICS & GYNECOLOGY

## 2023-01-12 PROCEDURE — 84450 TRANSFERASE (AST) (SGOT): CPT | Performed by: OBSTETRICS & GYNECOLOGY

## 2023-01-12 PROCEDURE — 76816 OB US FOLLOW-UP PER FETUS: CPT | Mod: 59

## 2023-01-12 PROCEDURE — 90471 IMMUNIZATION ADMIN: CPT

## 2023-01-12 PROCEDURE — G0463 HOSPITAL OUTPT CLINIC VISIT: HCPCS | Performed by: OBSTETRICS & GYNECOLOGY

## 2023-01-12 PROCEDURE — 250N000011 HC RX IP 250 OP 636

## 2023-01-12 PROCEDURE — 82950 GLUCOSE TEST: CPT | Performed by: OBSTETRICS & GYNECOLOGY

## 2023-01-12 PROCEDURE — 84460 ALANINE AMINO (ALT) (SGPT): CPT | Performed by: OBSTETRICS & GYNECOLOGY

## 2023-01-12 ASSESSMENT — ANXIETY QUESTIONNAIRES
3. WORRYING TOO MUCH ABOUT DIFFERENT THINGS: NOT AT ALL
6. BECOMING EASILY ANNOYED OR IRRITABLE: NOT AT ALL
GAD7 TOTAL SCORE: 0
2. NOT BEING ABLE TO STOP OR CONTROL WORRYING: NOT AT ALL
IF YOU CHECKED OFF ANY PROBLEMS ON THIS QUESTIONNAIRE, HOW DIFFICULT HAVE THESE PROBLEMS MADE IT FOR YOU TO DO YOUR WORK, TAKE CARE OF THINGS AT HOME, OR GET ALONG WITH OTHER PEOPLE: NOT DIFFICULT AT ALL
1. FEELING NERVOUS, ANXIOUS, OR ON EDGE: NOT AT ALL
7. FEELING AFRAID AS IF SOMETHING AWFUL MIGHT HAPPEN: NOT AT ALL
5. BEING SO RESTLESS THAT IT IS HARD TO SIT STILL: NOT AT ALL
GAD7 TOTAL SCORE: 0

## 2023-01-12 ASSESSMENT — PATIENT HEALTH QUESTIONNAIRE - PHQ9
5. POOR APPETITE OR OVEREATING: NOT AT ALL
SUM OF ALL RESPONSES TO PHQ QUESTIONS 1-9: 0

## 2023-01-12 ASSESSMENT — PAIN SCALES - GENERAL: PAINLEVEL: NO PAIN (0)

## 2023-01-12 NOTE — LETTER
Date:January 13, 2023      Provider requested that no letter be sent. Do not send.       [facility]
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
## 2023-01-13 ENCOUNTER — MYC MEDICAL ADVICE (OUTPATIENT)
Dept: OBGYN | Facility: CLINIC | Age: 33
End: 2023-01-13
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
## 2023-02-02 ENCOUNTER — OFFICE VISIT (OUTPATIENT)
Dept: MATERNAL FETAL MEDICINE | Facility: CLINIC | Age: 33
End: 2023-02-02
Attending: OBSTETRICS & GYNECOLOGY
Payer: COMMERCIAL

## 2023-02-02 ENCOUNTER — HOSPITAL ENCOUNTER (OUTPATIENT)
Dept: ULTRASOUND IMAGING | Facility: CLINIC | Age: 33
Discharge: HOME | End: 2023-02-02
Attending: OBSTETRICS & GYNECOLOGY
Payer: COMMERCIAL

## 2023-02-02 ENCOUNTER — OFFICE VISIT (OUTPATIENT)
Dept: OBGYN | Facility: CLINIC | Age: 33
End: 2023-02-02
Attending: OBSTETRICS & GYNECOLOGY
Payer: COMMERCIAL

## 2023-02-02 VITALS
BODY MASS INDEX: 25.66 KG/M2 | SYSTOLIC BLOOD PRESSURE: 122 MMHG | HEIGHT: 68 IN | WEIGHT: 169.3 LBS | HEART RATE: 83 BPM | DIASTOLIC BLOOD PRESSURE: 77 MMHG

## 2023-02-02 DIAGNOSIS — O26.90 PREGNANCY RELATED CONDITION, ANTEPARTUM: ICD-10-CM

## 2023-02-02 DIAGNOSIS — O09.91 SUPERVISION OF HIGH RISK PREGNANCY IN FIRST TRIMESTER: Primary | ICD-10-CM

## 2023-02-02 DIAGNOSIS — O30.042 DICHORIONIC DIAMNIOTIC TWIN PREGNANCY IN SECOND TRIMESTER: ICD-10-CM

## 2023-02-02 DIAGNOSIS — K21.9 GASTROESOPHAGEAL REFLUX DISEASE, UNSPECIFIED WHETHER ESOPHAGITIS PRESENT: ICD-10-CM

## 2023-02-02 DIAGNOSIS — O30.043 DICHORIONIC DIAMNIOTIC TWIN PREGNANCY IN THIRD TRIMESTER: Primary | ICD-10-CM

## 2023-02-02 PROCEDURE — 76816 OB US FOLLOW-UP PER FETUS: CPT | Mod: 26 | Performed by: OBSTETRICS & GYNECOLOGY

## 2023-02-02 PROCEDURE — 76816 OB US FOLLOW-UP PER FETUS: CPT

## 2023-02-02 PROCEDURE — G0463 HOSPITAL OUTPT CLINIC VISIT: HCPCS | Performed by: OBSTETRICS & GYNECOLOGY

## 2023-02-02 PROCEDURE — G0463 HOSPITAL OUTPT CLINIC VISIT: HCPCS | Mod: 25 | Performed by: OBSTETRICS & GYNECOLOGY

## 2023-02-02 PROCEDURE — 99207 PR PRENATAL VISIT: CPT | Performed by: OBSTETRICS & GYNECOLOGY

## 2023-02-02 NOTE — PROGRESS NOTES
"Please see \"Imaging\" tab under \"Chart Review\" for details of today's US at the [facility].    Luigi Garcia MD  Maternal-Fetal Medicine      "
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
## 2023-02-02 NOTE — LETTER
Date:February 3, 2023      Provider requested that no letter be sent. Do not send.       [facility]

## 2023-02-14 ENCOUNTER — MYC MEDICAL ADVICE (OUTPATIENT)
Dept: OBGYN | Facility: CLINIC | Age: 33
End: 2023-02-14
Payer: COMMERCIAL
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
## 2023-02-16 ENCOUNTER — OFFICE VISIT (OUTPATIENT)
Dept: OBGYN | Facility: CLINIC | Age: 33
End: 2023-02-16
Attending: OBSTETRICS & GYNECOLOGY
Payer: COMMERCIAL

## 2023-02-16 VITALS
WEIGHT: 170.5 LBS | BODY MASS INDEX: 25.84 KG/M2 | SYSTOLIC BLOOD PRESSURE: 111 MMHG | HEART RATE: 98 BPM | HEIGHT: 68 IN | DIASTOLIC BLOOD PRESSURE: 72 MMHG

## 2023-02-16 DIAGNOSIS — O30.042 DICHORIONIC DIAMNIOTIC TWIN PREGNANCY IN SECOND TRIMESTER: Primary | ICD-10-CM

## 2023-02-16 PROCEDURE — 99207 PR PRENATAL VISIT: CPT | Performed by: OBSTETRICS & GYNECOLOGY

## 2023-02-16 PROCEDURE — G0463 HOSPITAL OUTPT CLINIC VISIT: HCPCS | Performed by: OBSTETRICS & GYNECOLOGY

## 2023-02-16 NOTE — LETTER
Date:February 20, 2023      Provider requested that no letter be sent. Do not send.       [facility]
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
## 2023-02-17 PROBLEM — O30.042 DICHORIONIC DIAMNIOTIC TWIN PREGNANCY IN SECOND TRIMESTER: Status: ACTIVE | Noted: 2022-09-01

## 2023-02-24 ENCOUNTER — HOSPITAL ENCOUNTER (OUTPATIENT)
Dept: GENERAL RADIOLOGY | Facility: CLINIC | Age: 33
Discharge: HOME | End: 2023-02-24
Payer: COMMERCIAL

## 2023-02-24 ENCOUNTER — NURSE TRIAGE (OUTPATIENT)
Dept: OBGYN | Facility: CLINIC | Age: 33
End: 2023-02-24
Payer: COMMERCIAL

## 2023-02-24 ENCOUNTER — OFFICE VISIT (OUTPATIENT)
Dept: OBGYN | Facility: CLINIC | Age: 33
End: 2023-02-24
Attending: OBSTETRICS & GYNECOLOGY
Payer: COMMERCIAL

## 2023-02-24 VITALS
WEIGHT: 172.2 LBS | DIASTOLIC BLOOD PRESSURE: 83 MMHG | SYSTOLIC BLOOD PRESSURE: 119 MMHG | BODY MASS INDEX: 26.1 KG/M2 | HEART RATE: 99 BPM | HEIGHT: 68 IN

## 2023-02-24 DIAGNOSIS — R07.81 RIB PAIN ON LEFT SIDE: Primary | ICD-10-CM

## 2023-02-24 DIAGNOSIS — R07.81 RIB PAIN ON LEFT SIDE: ICD-10-CM

## 2023-02-24 DIAGNOSIS — O30.042 DICHORIONIC DIAMNIOTIC TWIN PREGNANCY IN SECOND TRIMESTER: ICD-10-CM

## 2023-02-24 PROCEDURE — 71046 X-RAY EXAM CHEST 2 VIEWS: CPT | Mod: 26 | Performed by: STUDENT IN AN ORGANIZED HEALTH CARE EDUCATION/TRAINING PROGRAM

## 2023-02-24 PROCEDURE — 71046 X-RAY EXAM CHEST 2 VIEWS: CPT

## 2023-02-24 PROCEDURE — 99207 PR PRENATAL VISIT: CPT | Mod: GE | Performed by: OBSTETRICS & GYNECOLOGY

## 2023-02-24 PROCEDURE — G0463 HOSPITAL OUTPT CLINIC VISIT: HCPCS | Mod: 25

## 2023-02-24 RX ORDER — CYCLOBENZAPRINE HCL 5 MG
5 TABLET ORAL 3 TIMES DAILY PRN
Qty: 20 TABLET | Refills: 0 | Status: ON HOLD | OUTPATIENT
Start: 2023-02-24 | End: 2023-03-25

## 2023-02-24 RX ORDER — HYDROXYZINE PAMOATE 25 MG/1
25-50 CAPSULE ORAL 3 TIMES DAILY PRN
Qty: 20 CAPSULE | Refills: 0 | Status: SHIPPED | OUTPATIENT
Start: 2023-02-24 | End: 2023-12-13
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
## 2023-02-24 NOTE — LETTER
"2023       RE: Nova Matthews  [address]  [place] [facility] [address]     Dear Colleague,    Thank you for referring your patient, Nova Matthews, to the [facility] [place] at [facility]. Please see a copy of my visit note below.    NEDA Visit 2023    S: Patient here for evaluation of left \"rib\" pain. Per patient, pain has been present since Monday but got much worse last night. It is feeling a little better this morning. Reports it is a constant pain that is 3-4/10 at rest, 8/10 with coughing or sneezing. Reports its worse with deep breathing and lying on that side. She tried tylenol which minimally helped. Endorses Watseka Cardoza, no painful contractions. No LOF or vaginal bleeding. Endorses active fetal movement. No headache, vision changes, chest pain, SOB, or RUQ pain. No ongoing cough. No other abdominal pain or recent abdominal trauma. She called the care line this morning who recommended she be evaluated.    O:  /83   Pulse 99   Ht 1.727 m (5' 8\")   Wt 78.1 kg (172 lb 3.2 oz)   BMI 26.18 kg/m       Gen: appears well, NAD  CV: RRR  Lungs: CTAB, exquisitely tender to palpation over left lower rib  Abd: soft, non-distended, non-tender, gravid    NST  Twin A: baseline 140, moderate variability, + accels, no decels  Twin B: baseline 140, moderate variability, + accels, no decels  Contractions: 0-1 q 10 min    A/P: 33 yo  @ 32w4d with di/di twin pregnancy presents for OB problem visit. Patient with significant \"rib\" pain with exquisite tenderness over that rib concerning for rib fracture. If not fractured, then pain still likely musculoskeletal in etiology. Low concern for abruption given reassuring NST and lack of abdominal pain and vaginal bleeding. Normal vitals today, no concern for PE or pre-eclampsia. Discussed further evaluation with CXR, patient amenable. Otherwise, discussed pain management with flexeril and " vistaril in addition to tylenol, prescription sent to pharmacy for patient. Discussed return precautions including worsening pain, SOB, decreased fetal movement, and vaginal bleeding. Patient comfortable with plan.    #Left side pain  #C/f rib fracture  - CXR ordered  - Flexeril, vistaril for symptom relief    Follow up with Dr. Valentin for NEDA visit next week or sooner if needed.    Pt discussed with Dr. Maria.    Dorothea Fay MD  OB/GYN Resident, PGY-2    Patient was seen by the resident in Continuity of Care Clinic.  I reviewed the history & exam.  The patient's assessment and plan were made jointly.    Ruthie Maria MD MPH        Dr. Fay requested RNs perform a NST on this patient.     US monitors placed and TOCO placed.     See flowsheets for charting of strip.    Category 1 for both twins.      Again, thank you for allowing me to participate in the care of your patient.      Sincerely,    Dorothea Fay MD

## 2023-02-24 NOTE — PROGRESS NOTES
Dr. Fay requested RNs perform a NST on this patient.     US monitors placed and TOCO placed.     See flowsheets for charting of strip.    Category 1 for both twins.

## 2023-02-24 NOTE — PROGRESS NOTES
"NEDA Visit 2023    S: Patient here for evaluation of left \"rib\" pain. Per patient, pain has been present since Monday but got much worse last night. It is feeling a little better this morning. Reports it is a constant pain that is 3-4/10 at rest, 8/10 with coughing or sneezing. Reports its worse with deep breathing and lying on that side. She tried tylenol which minimally helped. Endorses Jesse Cardoza, no painful contractions. No LOF or vaginal bleeding. Endorses active fetal movement. No headache, vision changes, chest pain, SOB, or RUQ pain. No ongoing cough. No other abdominal pain or recent abdominal trauma. She called the care line this morning who recommended she be evaluated.    O:  /83   Pulse 99   Ht 1.727 m (5' 8\")   Wt 78.1 kg (172 lb 3.2 oz)   BMI 26.18 kg/m       Gen: appears well, NAD  CV: RRR  Lungs: CTAB, exquisitely tender to palpation over left lower rib  Abd: soft, non-distended, non-tender, gravid    NST  Twin A: baseline 140, moderate variability, + accels, no decels  Twin B: baseline 140, moderate variability, + accels, no decels  Contractions: 0-1 q 10 min    A/P: 33 yo  @ 32w4d with di/di twin pregnancy presents for OB problem visit. Patient with significant \"rib\" pain with exquisite tenderness over that rib concerning for rib fracture. If not fractured, then pain still likely musculoskeletal in etiology. Low concern for abruption given reassuring NST and lack of abdominal pain and vaginal bleeding. Normal vitals today, no concern for PE or pre-eclampsia. Discussed further evaluation with CXR, patient amenable. Otherwise, discussed pain management with flexeril and vistaril in addition to tylenol, prescription sent to pharmacy for patient. Discussed return precautions including worsening pain, SOB, decreased fetal movement, and vaginal bleeding. Patient comfortable with plan.    #Left side pain  #C/f rib fracture  - CXR ordered  - Flexeril, vistaril for symptom " relief    Follow up with Dr. Valentin for NEDA visit next week or sooner if needed.    Pt discussed with Dr. Maria.    Dorothea Fay MD  OB/GYN Resident, PGY-2    Patient was seen by the resident in Continuity of Care Clinic.  I reviewed the history & exam.  The patient's assessment and plan were made jointly.    Ruthie Maria MD MPH

## 2023-02-24 NOTE — PATIENT INSTRUCTIONS
Thank you for trusting us with your care!     If you need to contact us for questions about:  Symptoms, Scheduling & Medical Questions; Non-urgent (2-3 day response) No message, Urgent (needing response today) [phone] (if after 3:30pm next day response)   Prescriptions: Please call your Pharmacy   Billing: Erin [phone] or [facility]:[phone]

## 2023-02-24 NOTE — LETTER
Date:February 27, 2023      Provider requested that no letter be sent. Do not send.       [facility]
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
## 2023-03-02 ENCOUNTER — OFFICE VISIT (OUTPATIENT)
Dept: OBGYN | Facility: CLINIC | Age: 33
End: 2023-03-02
Attending: OBSTETRICS & GYNECOLOGY
Payer: COMMERCIAL

## 2023-03-02 ENCOUNTER — HOSPITAL ENCOUNTER (OUTPATIENT)
Dept: ULTRASOUND IMAGING | Facility: CLINIC | Age: 33
Discharge: HOME | End: 2023-03-02
Attending: OBSTETRICS & GYNECOLOGY
Payer: COMMERCIAL

## 2023-03-02 ENCOUNTER — OFFICE VISIT (OUTPATIENT)
Dept: MATERNAL FETAL MEDICINE | Facility: CLINIC | Age: 33
End: 2023-03-02
Attending: OBSTETRICS & GYNECOLOGY
Payer: COMMERCIAL

## 2023-03-02 VITALS
DIASTOLIC BLOOD PRESSURE: 77 MMHG | WEIGHT: 176 LBS | HEART RATE: 73 BPM | HEIGHT: 68 IN | BODY MASS INDEX: 26.67 KG/M2 | SYSTOLIC BLOOD PRESSURE: 115 MMHG

## 2023-03-02 DIAGNOSIS — O09.93 SUPERVISION OF HIGH RISK PREGNANCY IN THIRD TRIMESTER: Primary | ICD-10-CM

## 2023-03-02 DIAGNOSIS — O30.042 DICHORIONIC DIAMNIOTIC TWIN PREGNANCY IN SECOND TRIMESTER: ICD-10-CM

## 2023-03-02 DIAGNOSIS — O26.90 PREGNANCY RELATED CONDITION, ANTEPARTUM: ICD-10-CM

## 2023-03-02 DIAGNOSIS — O30.043 DICHORIONIC DIAMNIOTIC TWIN PREGNANCY IN THIRD TRIMESTER: Primary | ICD-10-CM

## 2023-03-02 PROCEDURE — 99207 PR PRENATAL VISIT: CPT | Performed by: OBSTETRICS & GYNECOLOGY

## 2023-03-02 PROCEDURE — 87653 STREP B DNA AMP PROBE: CPT | Performed by: OBSTETRICS & GYNECOLOGY

## 2023-03-02 PROCEDURE — 76816 OB US FOLLOW-UP PER FETUS: CPT | Mod: 26 | Performed by: OBSTETRICS & GYNECOLOGY

## 2023-03-02 PROCEDURE — G0463 HOSPITAL OUTPT CLINIC VISIT: HCPCS | Mod: 25 | Performed by: OBSTETRICS & GYNECOLOGY

## 2023-03-02 PROCEDURE — 76816 OB US FOLLOW-UP PER FETUS: CPT | Mod: 59

## 2023-03-02 RX ORDER — OXYTOCIN/0.9 % SODIUM CHLORIDE 30/500 ML
340 PLASTIC BAG, INJECTION (ML) INTRAVENOUS CONTINUOUS PRN
Status: CANCELLED | OUTPATIENT
Start: 2023-03-02

## 2023-03-02 RX ORDER — MISOPROSTOL 100 UG/1
400 TABLET ORAL
Status: CANCELLED | OUTPATIENT
Start: 2023-03-02

## 2023-03-02 RX ORDER — ACETAMINOPHEN 325 MG/1
975 TABLET ORAL ONCE
Status: CANCELLED | OUTPATIENT
Start: 2023-03-02 | End: 2023-03-02

## 2023-03-02 RX ORDER — SODIUM CHLORIDE, SODIUM LACTATE, POTASSIUM CHLORIDE, CALCIUM CHLORIDE 600; 310; 30; 20 MG/100ML; MG/100ML; MG/100ML; MG/100ML
INJECTION, SOLUTION INTRAVENOUS CONTINUOUS
Status: CANCELLED | OUTPATIENT
Start: 2023-03-02

## 2023-03-02 RX ORDER — CITRIC ACID/SODIUM CITRATE 334-500MG
30 SOLUTION, ORAL ORAL
Status: CANCELLED | OUTPATIENT
Start: 2023-03-02

## 2023-03-02 RX ORDER — MISOPROSTOL 200 UG/1
800 TABLET ORAL
Status: CANCELLED | OUTPATIENT
Start: 2023-03-02

## 2023-03-02 RX ORDER — OXYTOCIN 10 [USP'U]/ML
10 INJECTION, SOLUTION INTRAMUSCULAR; INTRAVENOUS
Status: CANCELLED | OUTPATIENT
Start: 2023-03-02

## 2023-03-02 RX ORDER — LIDOCAINE 40 MG/G
CREAM TOPICAL
Status: CANCELLED | OUTPATIENT
Start: 2023-03-02

## 2023-03-02 RX ORDER — METHYLERGONOVINE MALEATE 0.2 MG/ML
200 INJECTION INTRAVENOUS
Status: CANCELLED | OUTPATIENT
Start: 2023-03-02

## 2023-03-02 RX ORDER — OXYTOCIN/0.9 % SODIUM CHLORIDE 30/500 ML
100-340 PLASTIC BAG, INJECTION (ML) INTRAVENOUS CONTINUOUS PRN
Status: CANCELLED | OUTPATIENT
Start: 2023-03-02

## 2023-03-02 RX ORDER — CARBOPROST TROMETHAMINE 250 UG/ML
250 INJECTION, SOLUTION INTRAMUSCULAR
Status: CANCELLED | OUTPATIENT
Start: 2023-03-02

## 2023-03-02 NOTE — PROGRESS NOTES
"Please see \"Imaging\" tab under \"Chart Review\" for details of today's US at the [facility].    Luigi Garcia MD  Maternal-Fetal Medicine      "
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
## 2023-03-02 NOTE — LETTER
Date:March 3, 2023      Provider requested that no letter be sent. Do not send.       [facility]

## 2023-03-02 NOTE — PATIENT INSTRUCTIONS
Thank you for trusting us with your care!     If you need to contact us for questions about:  Symptoms, Scheduling & Medical Questions; Non-urgent (2-3 day response) No message, Urgent (needing response today) [phone] (if after 3:30pm next day response)   Prescriptions: Please call your Pharmacy   Billing: Erin [phone] or [facility]:[phone]

## 2023-03-03 ENCOUNTER — TELEPHONE (OUTPATIENT)
Dept: OBGYN | Facility: CLINIC | Age: 33
End: 2023-03-03
Payer: COMMERCIAL

## 2023-03-03 LAB — GP B STREP DNA SPEC QL NAA+PROBE: NEGATIVE

## 2023-03-03 NOTE — TELEPHONE ENCOUNTER
Spoke with patient, scheduled surgery. Patient is aware of date, time, location, prep instructions, need for H&P within 30 days.    Type of surgery: PRIMARY  SECTION     Location of surgery: [facility] OR  Date and time of surgery: 3/24/23 at 8:30am  Surgeon: Suze  Pre-Op Appt Date: KEVAN  Post-Op Appt Date:   and 23  Packet sent out: Yes  Pre-cert/Authorization completed:  Yes  Date: 3/3/23    Gloria Bae  Clinical Services Assistant

## 2023-03-03 NOTE — LETTER
23    Patient: Nova Matthews  YOB: 1990    Thank you for choosing [facility] for your surgical procedure.  You will enter the hospital as a morning admission which means that you will spend at least one night in the hospital.    Your procedure is scheduled on:    Date: 2023    Time: 08:30 AM    Please arrive at: 06:30 AM    Procedure: PRIMARY  SECTION    MD: Kristal Arciniega MD    Location:     [address] [address]      [phone]      [email or web address]    NOTE: The times noted above may change.  A nurse from the hospital pre-admission department will call to confirm your procedure.  He or she will answer any questions you have about the procedure and will provide you with instructions for taking medications the day of the procedure.  If you have not received a call, or if you have more questions please call us one working day before your procedure. If you need to cancel or reschedule your surgery please call the surgery scheduling line at [phone].    PRE-OPERATIVE VISIT  You are required to have a pre-op physical (sometimes called an H&P, or History and Physical) 10-30 days before your procedure. This can be done at your primary care clinic or pre-operative center.  - If you are having a , you do not need this exam.    FOLLOW-UP/POST OPERATIVE VISIT    Please call now and schedule two postpartum visits with the clinic.     Your visit is due approximately 2 and 6 weeks after your procedure. We scheduled these visits for 23 at 9:30am and 23 at 1:45pm with Dr. Valentin.    COVID TESTING    The provider has requested that you take a COVID-19 test 2-4 days prior to your procedure. You may take an at-home rapid antigen test or a PCR test.    Cost of Care Estimates or Insurance Questions    If you have questions  regarding a cost estimate or insurance coverage, please call one of our Financial Service representatives at [phone] or submit a Pricing Inquiry Request Form    Preparing for Your Surgery  Getting started  A nurse will call you to review your health history and instructions. They will give you an arrival time based on your scheduled surgery time. Please be ready to share:    Your doctor's clinic name and phone number    Your medical, surgical, and anesthesia history    A list of allergies and sensitivities    A list of medicines, including herbal treatments and over-the-counter drugs    Whether the patient has a legal guardian (ask how to send us the papers in advance)  Please tell us if you're pregnant--or if there's any chance you might be pregnant. Some surgeries may injure a fetus (unborn baby), so they require a pregnancy test. Surgeries that are safe for a fetus don't always need a test, and you can choose whether to have one.   If you have a child who's having surgery, please ask for a copy of Preparing for Your Child's Surgery.    Preparing for surgery  1. Within 10 to 30 days of surgery: Have a pre-op exam (sometimes called an H&P, or History and Physical). This can be done at a clinic or pre-operative center.  ? If you're having a , you may not need this exam. Talk to your care team.  2. At your pre-op exam, talk to your care team about all medicines you take. If you need to stop any medicines before surgery, ask when to start taking them again.  ? We do this for your safety. Many medicines can make you bleed too much during surgery. Some change how well surgery (anesthesia) drugs work.  3. Call your insurance company to let them know you're having surgery. (If you don't have insurance, call [phone].)  4. Call your clinic if there's any change in your health. This includes signs of a cold or flu (sore throat, runny nose, cough, rash, fever). It also includes a scrape or scratch near the  surgery site.  5. If you have questions on the day of surgery, call your hospital or surgery center.  Eating and drinking guidelines  For your safety: Unless your surgeon tells you otherwise, follow the guidelines below.    Eat and drink as usual until 8 hours before you arrive for surgery. After that, no food or milk.    Drink clear liquids until 2 hours before you arrive. These are liquids you can see through, like water, Gatorade, and Propel Water. They also include plain black coffee and tea (no cream or milk), candy, and breath mints. You can spit out gum when you arrive.    If you drink alcohol: Stop drinking it the night before surgery.    If your care team tells you to take medicine on the morning of surgery, it's okay to take it with a sip of water.  Preventing infection  1. Shower or bathe the night before and morning of your surgery. Follow the instructions your clinic gave you. (If no instructions, use regular soap.)  2. Don't shave or clip hair near your surgery site. We'll remove the hair if needed.  3. Don't smoke or vape the morning of surgery. You may chew nicotine gum up to 2 hours before surgery. A nicotine patch is okay.  ? Note: Some surgeries require you to completely quit smoking and nicotine. Check with your surgeon.  4. Your care team will make every effort to keep you safe from infection. We will:  ? Clean our hands often with soap and water (or an alcohol-based hand rub).  ? Clean the skin at your surgery site with a special soap that kills germs.  ? Give you a special gown to keep you warm. (Cold raises the risk of infection.)  ? Wear special hair covers, masks, gowns and gloves during surgery.  ? Give antibiotic medicine, if prescribed. Not all surgeries need antibiotics.  What to bring on the day of surgery  1. Photo ID and insurance card  2. Copy of your health care directive, if you have one  3. Glasses and hearing aids (bring cases)  ? You can't wear contacts during  surgery  4. Inhaler and eye drops, if you use them (tell us about these when you arrive)  5. CPAP machine or breathing device, if you use them  6. A few personal items, if spending the night  7. If you have . . .  ? A pacemaker, ICD (cardiac defibrillator) or other implant: Bring the ID card.  ? An implanted stimulator: Bring the remote control.  ? A legal guardian: Bring a copy of the certified (court-stamped) guardianship papers.  Please remove any jewelry, including body piercings. Leave jewelry and other valuables at home.  If you're going home the day of surgery    You must have a responsible adult drive you home. They should stay with you overnight as well.    If you don't have someone to stay with you, and you aren't safe to go home alone, we may keep you overnight. Insurance often won't pay for this.  After surgery  If it's hard to control your pain or you need more pain medicine, please call your surgeon's office.  Questions?   If you have any questions for your care team, list them here: ________________________________________________________________________________________________________________________________________________________________________________________________________________________________________________________________________________________  For informational purposes only. Not to replace the advice of your health care provider. Copyright   2003, 2019 [facility]. All rights reserved. Clinically reviewed by Mary Woody MD. [employer] [id number] - REV 12/22.
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
## 2023-03-09 ENCOUNTER — OFFICE VISIT (OUTPATIENT)
Dept: OBGYN | Facility: CLINIC | Age: 33
End: 2023-03-09
Attending: OBSTETRICS & GYNECOLOGY
Payer: COMMERCIAL

## 2023-03-09 VITALS
HEART RATE: 79 BPM | DIASTOLIC BLOOD PRESSURE: 77 MMHG | BODY MASS INDEX: 26.98 KG/M2 | HEIGHT: 68 IN | SYSTOLIC BLOOD PRESSURE: 113 MMHG | WEIGHT: 178 LBS

## 2023-03-09 DIAGNOSIS — O30.042 DICHORIONIC DIAMNIOTIC TWIN PREGNANCY IN SECOND TRIMESTER: ICD-10-CM

## 2023-03-09 DIAGNOSIS — O09.93 SUPERVISION OF HIGH RISK PREGNANCY IN THIRD TRIMESTER: Primary | ICD-10-CM

## 2023-03-09 DIAGNOSIS — O92.70 LACTATION DISORDER: ICD-10-CM

## 2023-03-09 PROCEDURE — G0463 HOSPITAL OUTPT CLINIC VISIT: HCPCS | Performed by: OBSTETRICS & GYNECOLOGY

## 2023-03-09 PROCEDURE — 99207 PR PRENATAL VISIT: CPT | Performed by: OBSTETRICS & GYNECOLOGY

## 2023-03-09 ASSESSMENT — ANXIETY QUESTIONNAIRES
1. FEELING NERVOUS, ANXIOUS, OR ON EDGE: NOT AT ALL
GAD7 TOTAL SCORE: 0
5. BEING SO RESTLESS THAT IT IS HARD TO SIT STILL: NOT AT ALL
3. WORRYING TOO MUCH ABOUT DIFFERENT THINGS: NOT AT ALL
2. NOT BEING ABLE TO STOP OR CONTROL WORRYING: NOT AT ALL
6. BECOMING EASILY ANNOYED OR IRRITABLE: NOT AT ALL
7. FEELING AFRAID AS IF SOMETHING AWFUL MIGHT HAPPEN: NOT AT ALL
GAD7 TOTAL SCORE: 0

## 2023-03-09 ASSESSMENT — PATIENT HEALTH QUESTIONNAIRE - PHQ9
SUM OF ALL RESPONSES TO PHQ QUESTIONS 1-9: 0
5. POOR APPETITE OR OVEREATING: NOT AT ALL

## 2023-03-09 ASSESSMENT — PAIN SCALES - GENERAL: PAINLEVEL: NO PAIN (0)
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
## 2023-03-09 NOTE — PATIENT INSTRUCTIONS
Thank you for trusting us with your care!     If you need to contact us for questions about:  Symptoms, Scheduling & Medical Questions; Non-urgent (2-3 day response) No message, Urgent (needing response today) [phone] (if after 3:30pm next day response)   Prescriptions: Please call your Pharmacy   Billing: Erin [phone] or [facility]:[phone]

## 2023-03-09 NOTE — LETTER
Date:March 10, 2023      Provider requested that no letter be sent. Do not send.       [facility]
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
## 2023-03-16 ENCOUNTER — HOSPITAL ENCOUNTER (OUTPATIENT)
Dept: ULTRASOUND IMAGING | Facility: CLINIC | Age: 33
Discharge: HOME | End: 2023-03-16
Attending: OBSTETRICS & GYNECOLOGY
Payer: COMMERCIAL

## 2023-03-16 ENCOUNTER — OFFICE VISIT (OUTPATIENT)
Dept: OBGYN | Facility: CLINIC | Age: 33
End: 2023-03-16
Attending: OBSTETRICS & GYNECOLOGY
Payer: COMMERCIAL

## 2023-03-16 ENCOUNTER — OFFICE VISIT (OUTPATIENT)
Dept: MATERNAL FETAL MEDICINE | Facility: CLINIC | Age: 33
End: 2023-03-16
Attending: OBSTETRICS & GYNECOLOGY
Payer: COMMERCIAL

## 2023-03-16 VITALS
DIASTOLIC BLOOD PRESSURE: 74 MMHG | HEIGHT: 68 IN | WEIGHT: 182.9 LBS | BODY MASS INDEX: 27.72 KG/M2 | HEART RATE: 66 BPM | SYSTOLIC BLOOD PRESSURE: 109 MMHG

## 2023-03-16 DIAGNOSIS — O30.042 DICHORIONIC DIAMNIOTIC TWIN PREGNANCY IN SECOND TRIMESTER: Primary | ICD-10-CM

## 2023-03-16 DIAGNOSIS — O30.043 DICHORIONIC DIAMNIOTIC TWIN PREGNANCY IN THIRD TRIMESTER: ICD-10-CM

## 2023-03-16 DIAGNOSIS — O30.049 TWIN DICHORIONIC DIAMNIOTIC PLACENTA: Primary | ICD-10-CM

## 2023-03-16 PROCEDURE — G0463 HOSPITAL OUTPT CLINIC VISIT: HCPCS | Mod: 25 | Performed by: OBSTETRICS & GYNECOLOGY

## 2023-03-16 PROCEDURE — 76819 FETAL BIOPHYS PROFIL W/O NST: CPT

## 2023-03-16 PROCEDURE — 99207 PR PRENATAL VISIT: CPT | Performed by: OBSTETRICS & GYNECOLOGY

## 2023-03-16 PROCEDURE — 76819 FETAL BIOPHYS PROFIL W/O NST: CPT | Mod: 26 | Performed by: OBSTETRICS & GYNECOLOGY
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
## 2023-03-16 NOTE — PATIENT INSTRUCTIONS
Thank you for trusting us with your care!     If you need to contact us for questions about:  Symptoms, Scheduling & Medical Questions; Non-urgent (2-3 day response) No message, Urgent (needing response today) [phone] (if after 3:30pm next day response)   Prescriptions: Please call your Pharmacy   Billing: Erin [phone] or [facility]:[phone]

## 2023-03-16 NOTE — LETTER
Date:March 17, 2023      Provider requested that no letter be sent. Do not send.       [facility]
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
## 2023-03-21 ENCOUNTER — ANESTHESIA EVENT (OUTPATIENT)
Dept: OBGYN | Facility: CLINIC | Age: 33
End: 2023-03-21
Payer: COMMERCIAL
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
## 2023-03-22 ENCOUNTER — TELEPHONE (OUTPATIENT)
Dept: OBGYN | Facility: CLINIC | Age: 33
End: 2023-03-22

## 2023-03-22 ENCOUNTER — HOSPITAL ENCOUNTER (OUTPATIENT)
Dept: ULTRASOUND IMAGING | Facility: CLINIC | Age: 33
Discharge: HOME | End: 2023-03-22
Attending: OBSTETRICS & GYNECOLOGY
Payer: COMMERCIAL

## 2023-03-22 ENCOUNTER — OFFICE VISIT (OUTPATIENT)
Dept: OBGYN | Facility: CLINIC | Age: 33
End: 2023-03-22
Attending: OBSTETRICS & GYNECOLOGY
Payer: COMMERCIAL

## 2023-03-22 ENCOUNTER — OFFICE VISIT (OUTPATIENT)
Dept: MATERNAL FETAL MEDICINE | Facility: CLINIC | Age: 33
End: 2023-03-22
Attending: OBSTETRICS & GYNECOLOGY
Payer: COMMERCIAL

## 2023-03-22 ENCOUNTER — LAB (OUTPATIENT)
Dept: LAB | Facility: CLINIC | Age: 33
End: 2023-03-22
Attending: OBSTETRICS & GYNECOLOGY
Payer: COMMERCIAL

## 2023-03-22 VITALS
WEIGHT: 182.4 LBS | DIASTOLIC BLOOD PRESSURE: 71 MMHG | HEART RATE: 82 BPM | SYSTOLIC BLOOD PRESSURE: 102 MMHG | HEIGHT: 68 IN | BODY MASS INDEX: 27.65 KG/M2

## 2023-03-22 DIAGNOSIS — O30.042 DICHORIONIC DIAMNIOTIC TWIN PREGNANCY IN SECOND TRIMESTER: Primary | ICD-10-CM

## 2023-03-22 DIAGNOSIS — O30.042 DICHORIONIC DIAMNIOTIC TWIN PREGNANCY IN SECOND TRIMESTER: ICD-10-CM

## 2023-03-22 DIAGNOSIS — O09.93 SUPERVISION OF HIGH RISK PREGNANCY IN THIRD TRIMESTER: Primary | ICD-10-CM

## 2023-03-22 DIAGNOSIS — O30.043 DICHORIONIC DIAMNIOTIC TWIN PREGNANCY IN THIRD TRIMESTER: ICD-10-CM

## 2023-03-22 LAB — SARS-COV-2 RNA RESP QL NAA+PROBE: NEGATIVE

## 2023-03-22 PROCEDURE — 76819 FETAL BIOPHYS PROFIL W/O NST: CPT | Mod: 26 | Performed by: OBSTETRICS & GYNECOLOGY

## 2023-03-22 PROCEDURE — 99207 PR PRENATAL VISIT: CPT | Performed by: OBSTETRICS & GYNECOLOGY

## 2023-03-22 PROCEDURE — U0005 INFEC AGEN DETEC AMPLI PROBE: HCPCS

## 2023-03-22 PROCEDURE — G0463 HOSPITAL OUTPT CLINIC VISIT: HCPCS | Mod: 25 | Performed by: OBSTETRICS & GYNECOLOGY

## 2023-03-22 PROCEDURE — 76819 FETAL BIOPHYS PROFIL W/O NST: CPT | Mod: 59

## 2023-03-22 PROCEDURE — U0003 INFECTIOUS AGENT DETECTION BY NUCLEIC ACID (DNA OR RNA); SEVERE ACUTE RESPIRATORY SYNDROME CORONAVIRUS 2 (SARS-COV-2) (CORONAVIRUS DISEASE [COVID-19]), AMPLIFIED PROBE TECHNIQUE, MAKING USE OF HIGH THROUGHPUT TECHNOLOGIES AS DESCRIBED BY CMS-2020-01-R: HCPCS

## 2023-03-22 NOTE — NURSING NOTE
Patient reports positive fetal movement, no pain, no contractions, leaking of fluid, or bleeding.   SBAR given to REBECCA MADDEN, see their note in Epic.

## 2023-03-22 NOTE — PATIENT INSTRUCTIONS
Thank you for trusting us with your care!     If you need to contact us for questions about:  Symptoms, Scheduling & Medical Questions; Non-urgent (2-3 day response) No message, Urgent (needing response today) [phone] (if after 3:30pm next day response)   Prescriptions: Please call your Pharmacy   Billing: Erin [phone] or [facility]:[phone]

## 2023-03-22 NOTE — TELEPHONE ENCOUNTER
Called patient back and relayed message from Dr. Valentin: not imperative that she have this drink. Able to drink water up until 2 hours preop.     Patient stated understanding. No further questions at this time.

## 2023-03-22 NOTE — TELEPHONE ENCOUNTER
Patient calling stating she left her pre-surgery drink at her US and was wondering if it is really important to have and how to get another one. She is 40 min from the clinic and does not want to come back today.     Will route to Dr. Valentin as that is who she saw today for advise.
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
## 2023-03-22 NOTE — LETTER
Date:March 23, 2023      Provider requested that no letter be sent. Do not send.       [facility]

## 2023-03-23 RX ORDER — SODIUM CHLORIDE, SODIUM LACTATE, POTASSIUM CHLORIDE, CALCIUM CHLORIDE 600; 310; 30; 20 MG/100ML; MG/100ML; MG/100ML; MG/100ML
INJECTION, SOLUTION INTRAVENOUS CONTINUOUS
Status: CANCELLED | OUTPATIENT
Start: 2023-03-23

## 2023-03-23 RX ORDER — NALBUPHINE HYDROCHLORIDE 10 MG/ML
2.5-5 INJECTION, SOLUTION INTRAMUSCULAR; INTRAVENOUS; SUBCUTANEOUS EVERY 6 HOURS PRN
Status: CANCELLED | OUTPATIENT
Start: 2023-03-23

## 2023-03-23 RX ORDER — FENTANYL CITRATE 50 UG/ML
25 INJECTION, SOLUTION INTRAMUSCULAR; INTRAVENOUS EVERY 5 MIN PRN
Status: CANCELLED | OUTPATIENT
Start: 2023-03-23

## 2023-03-23 RX ORDER — HYDRALAZINE HYDROCHLORIDE 20 MG/ML
2.5-5 INJECTION INTRAMUSCULAR; INTRAVENOUS EVERY 10 MIN PRN
Status: CANCELLED | OUTPATIENT
Start: 2023-03-23

## 2023-03-23 RX ORDER — FENTANYL CITRATE-0.9 % NACL/PF 10 MCG/ML
100 PLASTIC BAG, INJECTION (ML) INTRAVENOUS EVERY 5 MIN PRN
Status: CANCELLED | OUTPATIENT
Start: 2023-03-23

## 2023-03-23 RX ORDER — HYDROMORPHONE HCL IN WATER/PF 6 MG/30 ML
0.2 PATIENT CONTROLLED ANALGESIA SYRINGE INTRAVENOUS EVERY 5 MIN PRN
Status: CANCELLED | OUTPATIENT
Start: 2023-03-23

## 2023-03-23 RX ORDER — HALOPERIDOL 5 MG/ML
1 INJECTION INTRAMUSCULAR
Status: CANCELLED | OUTPATIENT
Start: 2023-03-23

## 2023-03-23 RX ORDER — LABETALOL HYDROCHLORIDE 5 MG/ML
10 INJECTION, SOLUTION INTRAVENOUS
Status: CANCELLED | OUTPATIENT
Start: 2023-03-23

## 2023-03-23 RX ORDER — ONDANSETRON 4 MG/1
4 TABLET, ORALLY DISINTEGRATING ORAL EVERY 30 MIN PRN
Status: CANCELLED | OUTPATIENT
Start: 2023-03-23

## 2023-03-23 RX ORDER — ONDANSETRON 2 MG/ML
4 INJECTION INTRAMUSCULAR; INTRAVENOUS EVERY 30 MIN PRN
Status: CANCELLED | OUTPATIENT
Start: 2023-03-23

## 2023-03-23 RX ORDER — FENTANYL CITRATE 50 UG/ML
50 INJECTION, SOLUTION INTRAMUSCULAR; INTRAVENOUS EVERY 5 MIN PRN
Status: CANCELLED | OUTPATIENT
Start: 2023-03-23

## 2023-03-23 RX ORDER — HYDROMORPHONE HCL IN WATER/PF 6 MG/30 ML
0.4 PATIENT CONTROLLED ANALGESIA SYRINGE INTRAVENOUS EVERY 5 MIN PRN
Status: CANCELLED | OUTPATIENT
Start: 2023-03-23

## 2023-03-24 ENCOUNTER — HOSPITAL ENCOUNTER (INPATIENT)
Facility: CLINIC | Age: 33
LOS: 2 days | Discharge: HOME HEALTH | End: 2023-03-26
Attending: STUDENT IN AN ORGANIZED HEALTH CARE EDUCATION/TRAINING PROGRAM | Admitting: STUDENT IN AN ORGANIZED HEALTH CARE EDUCATION/TRAINING PROGRAM
Payer: COMMERCIAL

## 2023-03-24 ENCOUNTER — ANESTHESIA (OUTPATIENT)
Dept: OBGYN | Facility: CLINIC | Age: 33
End: 2023-03-24
Payer: COMMERCIAL

## 2023-03-24 DIAGNOSIS — O30.042 DICHORIONIC DIAMNIOTIC TWIN PREGNANCY IN SECOND TRIMESTER: ICD-10-CM

## 2023-03-24 DIAGNOSIS — O09.91 SUPERVISION OF HIGH RISK PREGNANCY IN FIRST TRIMESTER: ICD-10-CM

## 2023-03-24 LAB
ABO/RH(D): NORMAL
ANTIBODY SCREEN: NEGATIVE
ERYTHROCYTE [DISTWIDTH] IN BLOOD BY AUTOMATED COUNT: 13.4 % (ref 10–15)
HCT VFR BLD AUTO: 34.4 % (ref 35–47)
HGB BLD-MCNC: 10.9 G/DL (ref 11.7–15.7)
MCH RBC QN AUTO: 26.8 PG (ref 26.5–33)
MCHC RBC AUTO-ENTMCNC: 31.7 G/DL (ref 31.5–36.5)
MCV RBC AUTO: 85 FL (ref 78–100)
PLATELET # BLD AUTO: 186 10E3/UL (ref 150–450)
RBC # BLD AUTO: 4.06 10E6/UL (ref 3.8–5.2)
SPECIMEN EXPIRATION DATE: NORMAL
T PALLIDUM AB SER QL: NONREACTIVE
WBC # BLD AUTO: 10 10E3/UL (ref 4–11)

## 2023-03-24 PROCEDURE — 86850 RBC ANTIBODY SCREEN: CPT | Performed by: OBSTETRICS & GYNECOLOGY

## 2023-03-24 PROCEDURE — 59510 CESAREAN DELIVERY: CPT | Mod: 22 | Performed by: STUDENT IN AN ORGANIZED HEALTH CARE EDUCATION/TRAINING PROGRAM

## 2023-03-24 PROCEDURE — 86780 TREPONEMA PALLIDUM: CPT | Performed by: OBSTETRICS & GYNECOLOGY

## 2023-03-24 PROCEDURE — 271N000001 HC OR GENERAL SUPPLY NON-STERILE: Performed by: STUDENT IN AN ORGANIZED HEALTH CARE EDUCATION/TRAINING PROGRAM

## 2023-03-24 PROCEDURE — 250N000013 HC RX MED GY IP 250 OP 250 PS 637

## 2023-03-24 PROCEDURE — 85027 COMPLETE CBC AUTOMATED: CPT | Performed by: OBSTETRICS & GYNECOLOGY

## 2023-03-24 PROCEDURE — 250N000011 HC RX IP 250 OP 636

## 2023-03-24 PROCEDURE — 250N000011 HC RX IP 250 OP 636: Performed by: STUDENT IN AN ORGANIZED HEALTH CARE EDUCATION/TRAINING PROGRAM

## 2023-03-24 PROCEDURE — 999N000141 HC STATISTIC PRE-PROCEDURE NURSING ASSESSMENT: Performed by: STUDENT IN AN ORGANIZED HEALTH CARE EDUCATION/TRAINING PROGRAM

## 2023-03-24 PROCEDURE — 258N000003 HC RX IP 258 OP 636: Performed by: STUDENT IN AN ORGANIZED HEALTH CARE EDUCATION/TRAINING PROGRAM

## 2023-03-24 PROCEDURE — 250N000009 HC RX 250: Performed by: OBSTETRICS & GYNECOLOGY

## 2023-03-24 PROCEDURE — 250N000013 HC RX MED GY IP 250 OP 250 PS 637: Performed by: OBSTETRICS & GYNECOLOGY

## 2023-03-24 PROCEDURE — 272N000001 HC OR GENERAL SUPPLY STERILE: Performed by: STUDENT IN AN ORGANIZED HEALTH CARE EDUCATION/TRAINING PROGRAM

## 2023-03-24 PROCEDURE — 120N000002 HC R&B MED SURG/OB UMMC

## 2023-03-24 PROCEDURE — 360N000076 HC SURGERY LEVEL 3, PER MIN: Performed by: STUDENT IN AN ORGANIZED HEALTH CARE EDUCATION/TRAINING PROGRAM

## 2023-03-24 PROCEDURE — 258N000003 HC RX IP 258 OP 636: Performed by: OBSTETRICS & GYNECOLOGY

## 2023-03-24 PROCEDURE — 250N000011 HC RX IP 250 OP 636: Performed by: OBSTETRICS & GYNECOLOGY

## 2023-03-24 PROCEDURE — 370N000017 HC ANESTHESIA TECHNICAL FEE, PER MIN: Performed by: STUDENT IN AN ORGANIZED HEALTH CARE EDUCATION/TRAINING PROGRAM

## 2023-03-24 PROCEDURE — 86901 BLOOD TYPING SEROLOGIC RH(D): CPT | Performed by: OBSTETRICS & GYNECOLOGY

## 2023-03-24 PROCEDURE — 710N000009 HC RECOVERY PHASE 1, LEVEL 1, PER MIN: Performed by: STUDENT IN AN ORGANIZED HEALTH CARE EDUCATION/TRAINING PROGRAM

## 2023-03-24 PROCEDURE — C9290 INJ, BUPIVACAINE LIPOSOME: HCPCS | Performed by: STUDENT IN AN ORGANIZED HEALTH CARE EDUCATION/TRAINING PROGRAM

## 2023-03-24 PROCEDURE — 999N000016 HC STATISTIC ATTENDANCE AT DELIVERY

## 2023-03-24 RX ORDER — NALOXONE HYDROCHLORIDE 1 MG/ML
0.4 INJECTION INTRAMUSCULAR; INTRAVENOUS; SUBCUTANEOUS
Status: DISCONTINUED | OUTPATIENT
Start: 2023-03-24 | End: 2023-03-26 | Stop reason: HOSPADM

## 2023-03-24 RX ORDER — CEFAZOLIN SODIUM/WATER 2 G/20 ML
2 SYRINGE (ML) INTRAVENOUS SEE ADMIN INSTRUCTIONS
Status: DISCONTINUED | OUTPATIENT
Start: 2023-03-24 | End: 2023-03-24

## 2023-03-24 RX ORDER — SODIUM CHLORIDE, SODIUM LACTATE, POTASSIUM CHLORIDE, CALCIUM CHLORIDE 600; 310; 30; 20 MG/100ML; MG/100ML; MG/100ML; MG/100ML
INJECTION, SOLUTION INTRAVENOUS CONTINUOUS
Status: DISCONTINUED | OUTPATIENT
Start: 2023-03-24 | End: 2023-03-24

## 2023-03-24 RX ORDER — MISOPROSTOL 200 UG/1
800 TABLET ORAL
Status: DISCONTINUED | OUTPATIENT
Start: 2023-03-24 | End: 2023-03-24

## 2023-03-24 RX ORDER — CEFAZOLIN SODIUM/WATER 2 G/20 ML
2 SYRINGE (ML) INTRAVENOUS
Status: COMPLETED | OUTPATIENT
Start: 2023-03-24 | End: 2023-03-24

## 2023-03-24 RX ORDER — OXYTOCIN/0.9 % SODIUM CHLORIDE 30/500 ML
340 PLASTIC BAG, INJECTION (ML) INTRAVENOUS CONTINUOUS PRN
Status: DISCONTINUED | OUTPATIENT
Start: 2023-03-24 | End: 2023-03-26 | Stop reason: HOSPADM

## 2023-03-24 RX ORDER — CITRIC ACID/SODIUM CITRATE 334-500MG
30 SOLUTION, ORAL ORAL
Status: COMPLETED | OUTPATIENT
Start: 2023-03-24 | End: 2023-03-24

## 2023-03-24 RX ORDER — ACETAMINOPHEN 325 MG/1
975 TABLET ORAL ONCE
Status: COMPLETED | OUTPATIENT
Start: 2023-03-24 | End: 2023-03-24

## 2023-03-24 RX ORDER — OXYTOCIN/0.9 % SODIUM CHLORIDE 30/500 ML
100-340 PLASTIC BAG, INJECTION (ML) INTRAVENOUS CONTINUOUS PRN
Status: DISCONTINUED | OUTPATIENT
Start: 2023-03-24 | End: 2023-03-26 | Stop reason: HOSPADM

## 2023-03-24 RX ORDER — MISOPROSTOL 200 UG/1
800 TABLET ORAL
Status: DISCONTINUED | OUTPATIENT
Start: 2023-03-24 | End: 2023-03-26 | Stop reason: HOSPADM

## 2023-03-24 RX ORDER — KETOROLAC TROMETHAMINE 30 MG/ML
30 INJECTION, SOLUTION INTRAMUSCULAR; INTRAVENOUS EVERY 6 HOURS
Status: COMPLETED | OUTPATIENT
Start: 2023-03-24 | End: 2023-03-25

## 2023-03-24 RX ORDER — AMOXICILLIN 250 MG
2 CAPSULE ORAL 2 TIMES DAILY
Status: DISCONTINUED | OUTPATIENT
Start: 2023-03-24 | End: 2023-03-26 | Stop reason: HOSPADM

## 2023-03-24 RX ORDER — MISOPROSTOL 200 UG/1
400 TABLET ORAL
Status: DISCONTINUED | OUTPATIENT
Start: 2023-03-24 | End: 2023-03-24

## 2023-03-24 RX ORDER — IBUPROFEN 800 MG/1
800 TABLET, FILM COATED ORAL EVERY 6 HOURS
Status: DISCONTINUED | OUTPATIENT
Start: 2023-03-25 | End: 2023-03-26 | Stop reason: HOSPADM

## 2023-03-24 RX ORDER — NALOXONE HYDROCHLORIDE 1 MG/ML
0.2 INJECTION INTRAMUSCULAR; INTRAVENOUS; SUBCUTANEOUS
Status: DISCONTINUED | OUTPATIENT
Start: 2023-03-24 | End: 2023-03-26 | Stop reason: HOSPADM

## 2023-03-24 RX ORDER — PROCHLORPERAZINE MALEATE 10 MG
10 TABLET ORAL EVERY 6 HOURS PRN
Status: DISCONTINUED | OUTPATIENT
Start: 2023-03-24 | End: 2023-03-26 | Stop reason: HOSPADM

## 2023-03-24 RX ORDER — ONDANSETRON 2 MG/ML
4 INJECTION INTRAMUSCULAR; INTRAVENOUS EVERY 6 HOURS PRN
Status: DISCONTINUED | OUTPATIENT
Start: 2023-03-24 | End: 2023-03-26 | Stop reason: HOSPADM

## 2023-03-24 RX ORDER — HYDROCORTISONE 25 MG/G
CREAM TOPICAL 3 TIMES DAILY PRN
Status: DISCONTINUED | OUTPATIENT
Start: 2023-03-24 | End: 2023-03-26 | Stop reason: HOSPADM

## 2023-03-24 RX ORDER — LIDOCAINE 40 MG/G
CREAM TOPICAL
Status: DISCONTINUED | OUTPATIENT
Start: 2023-03-24 | End: 2023-03-24

## 2023-03-24 RX ORDER — ONDANSETRON 4 MG/1
4 TABLET, ORALLY DISINTEGRATING ORAL EVERY 6 HOURS PRN
Status: DISCONTINUED | OUTPATIENT
Start: 2023-03-24 | End: 2023-03-26 | Stop reason: HOSPADM

## 2023-03-24 RX ORDER — BUPIVACAINE HYDROCHLORIDE 7.5 MG/ML
INJECTION, SOLUTION INTRASPINAL
Status: COMPLETED | OUTPATIENT
Start: 2023-03-24 | End: 2023-03-24

## 2023-03-24 RX ORDER — FENTANYL CITRATE 50 UG/ML
INJECTION, SOLUTION INTRAMUSCULAR; INTRAVENOUS
Status: COMPLETED | OUTPATIENT
Start: 2023-03-24 | End: 2023-03-24

## 2023-03-24 RX ORDER — METHYLERGONOVINE MALEATE 0.2 MG/ML
200 INJECTION INTRAVENOUS
Status: DISCONTINUED | OUTPATIENT
Start: 2023-03-24 | End: 2023-03-26 | Stop reason: HOSPADM

## 2023-03-24 RX ORDER — OXYTOCIN 10 [USP'U]/ML
10 INJECTION, SOLUTION INTRAMUSCULAR; INTRAVENOUS
Status: DISCONTINUED | OUTPATIENT
Start: 2023-03-24 | End: 2023-03-26 | Stop reason: HOSPADM

## 2023-03-24 RX ORDER — MODIFIED LANOLIN
OINTMENT (GRAM) TOPICAL
Status: DISCONTINUED | OUTPATIENT
Start: 2023-03-24 | End: 2023-03-26 | Stop reason: HOSPADM

## 2023-03-24 RX ORDER — BUPIVACAINE HYDROCHLORIDE 2.5 MG/ML
INJECTION, SOLUTION EPIDURAL; INFILTRATION; INTRACAUDAL
Status: DISCONTINUED | OUTPATIENT
Start: 2023-03-24 | End: 2023-03-24

## 2023-03-24 RX ORDER — METOCLOPRAMIDE 10 MG/1
10 TABLET ORAL EVERY 6 HOURS PRN
Status: DISCONTINUED | OUTPATIENT
Start: 2023-03-24 | End: 2023-03-26 | Stop reason: HOSPADM

## 2023-03-24 RX ORDER — TRANEXAMIC ACID 10 MG/ML
1 INJECTION, SOLUTION INTRAVENOUS EVERY 30 MIN PRN
Status: DISCONTINUED | OUTPATIENT
Start: 2023-03-24 | End: 2023-03-26 | Stop reason: HOSPADM

## 2023-03-24 RX ORDER — ACETAMINOPHEN 325 MG/1
975 TABLET ORAL ONCE
Status: DISCONTINUED | OUTPATIENT
Start: 2023-03-24 | End: 2023-03-24 | Stop reason: HOSPADM

## 2023-03-24 RX ORDER — METHYLERGONOVINE MALEATE 0.2 MG/ML
200 INJECTION INTRAVENOUS
Status: DISCONTINUED | OUTPATIENT
Start: 2023-03-24 | End: 2023-03-24

## 2023-03-24 RX ORDER — CARBOPROST TROMETHAMINE 250 UG/ML
250 INJECTION, SOLUTION INTRAMUSCULAR
Status: DISCONTINUED | OUTPATIENT
Start: 2023-03-24 | End: 2023-03-26 | Stop reason: HOSPADM

## 2023-03-24 RX ORDER — SIMETHICONE 80 MG
80 TABLET,CHEWABLE ORAL 4 TIMES DAILY PRN
Status: DISCONTINUED | OUTPATIENT
Start: 2023-03-24 | End: 2023-03-26 | Stop reason: HOSPADM

## 2023-03-24 RX ORDER — OXYTOCIN 10 [USP'U]/ML
10 INJECTION, SOLUTION INTRAMUSCULAR; INTRAVENOUS
Status: DISCONTINUED | OUTPATIENT
Start: 2023-03-24 | End: 2023-03-24

## 2023-03-24 RX ORDER — AMOXICILLIN 250 MG
1 CAPSULE ORAL 2 TIMES DAILY
Status: DISCONTINUED | OUTPATIENT
Start: 2023-03-24 | End: 2023-03-26 | Stop reason: HOSPADM

## 2023-03-24 RX ORDER — CARBOPROST TROMETHAMINE 250 UG/ML
250 INJECTION, SOLUTION INTRAMUSCULAR
Status: DISCONTINUED | OUTPATIENT
Start: 2023-03-24 | End: 2023-03-24

## 2023-03-24 RX ORDER — LIDOCAINE 40 MG/G
CREAM TOPICAL
Status: DISCONTINUED | OUTPATIENT
Start: 2023-03-24 | End: 2023-03-26 | Stop reason: HOSPADM

## 2023-03-24 RX ORDER — ACETAMINOPHEN 325 MG/1
975 TABLET ORAL EVERY 6 HOURS
Status: DISCONTINUED | OUTPATIENT
Start: 2023-03-24 | End: 2023-03-26 | Stop reason: HOSPADM

## 2023-03-24 RX ORDER — MORPHINE SULFATE 1 MG/ML
INJECTION, SOLUTION EPIDURAL; INTRATHECAL; INTRAVENOUS
Status: COMPLETED | OUTPATIENT
Start: 2023-03-24 | End: 2023-03-24

## 2023-03-24 RX ORDER — OXYTOCIN/0.9 % SODIUM CHLORIDE 30/500 ML
340 PLASTIC BAG, INJECTION (ML) INTRAVENOUS CONTINUOUS PRN
Status: COMPLETED | OUTPATIENT
Start: 2023-03-24 | End: 2023-03-24

## 2023-03-24 RX ORDER — DEXTROSE, SODIUM CHLORIDE, SODIUM LACTATE, POTASSIUM CHLORIDE, AND CALCIUM CHLORIDE 5; .6; .31; .03; .02 G/100ML; G/100ML; G/100ML; G/100ML; G/100ML
INJECTION, SOLUTION INTRAVENOUS CONTINUOUS
Status: DISCONTINUED | OUTPATIENT
Start: 2023-03-24 | End: 2023-03-26 | Stop reason: HOSPADM

## 2023-03-24 RX ORDER — TRANEXAMIC ACID 10 MG/ML
1 INJECTION, SOLUTION INTRAVENOUS EVERY 30 MIN PRN
Status: DISCONTINUED | OUTPATIENT
Start: 2023-03-24 | End: 2023-03-24

## 2023-03-24 RX ORDER — KETOROLAC TROMETHAMINE 30 MG/ML
INJECTION, SOLUTION INTRAMUSCULAR; INTRAVENOUS PRN
Status: DISCONTINUED | OUTPATIENT
Start: 2023-03-24 | End: 2023-03-24

## 2023-03-24 RX ORDER — MISOPROSTOL 200 UG/1
400 TABLET ORAL
Status: DISCONTINUED | OUTPATIENT
Start: 2023-03-24 | End: 2023-03-26 | Stop reason: HOSPADM

## 2023-03-24 RX ORDER — BISACODYL 10 MG
10 SUPPOSITORY, RECTAL RECTAL DAILY PRN
Status: DISCONTINUED | OUTPATIENT
Start: 2023-03-26 | End: 2023-03-26 | Stop reason: HOSPADM

## 2023-03-24 RX ORDER — PROCHLORPERAZINE 25 MG
25 SUPPOSITORY, RECTAL RECTAL EVERY 12 HOURS PRN
Status: DISCONTINUED | OUTPATIENT
Start: 2023-03-24 | End: 2023-03-26 | Stop reason: HOSPADM

## 2023-03-24 RX ORDER — OXYCODONE HYDROCHLORIDE 5 MG/1
5 TABLET ORAL EVERY 4 HOURS PRN
Status: DISCONTINUED | OUTPATIENT
Start: 2023-03-24 | End: 2023-03-26 | Stop reason: HOSPADM

## 2023-03-24 RX ORDER — METOCLOPRAMIDE HYDROCHLORIDE 5 MG/ML
10 INJECTION INTRAMUSCULAR; INTRAVENOUS EVERY 6 HOURS PRN
Status: DISCONTINUED | OUTPATIENT
Start: 2023-03-24 | End: 2023-03-26 | Stop reason: HOSPADM

## 2023-03-24 RX ADMIN — BUPIVACAINE HYDROCHLORIDE 20 ML: 2.5 INJECTION, SOLUTION EPIDURAL; INFILTRATION; INTRACAUDAL at 09:52

## 2023-03-24 RX ADMIN — KETOROLAC TROMETHAMINE 30 MG: 30 INJECTION, SOLUTION INTRAMUSCULAR; INTRAVENOUS at 15:26

## 2023-03-24 RX ADMIN — BUPIVACAINE 20 ML: 13.3 INJECTION, SUSPENSION, LIPOSOMAL INFILTRATION at 09:52

## 2023-03-24 RX ADMIN — MORPHINE SULFATE 0.15 MG: 1 INJECTION EPIDURAL; INTRATHECAL; INTRAVENOUS at 08:43

## 2023-03-24 RX ADMIN — KETOROLAC TROMETHAMINE 30 MG: 30 INJECTION, SOLUTION INTRAMUSCULAR; INTRAVENOUS at 22:01

## 2023-03-24 RX ADMIN — SODIUM CITRATE AND CITRIC ACID MONOHYDRATE 30 ML: 500; 334 SOLUTION ORAL at 07:58

## 2023-03-24 RX ADMIN — ACETAMINOPHEN 975 MG: 325 TABLET ORAL at 20:11

## 2023-03-24 RX ADMIN — OXYTOCIN-SODIUM CHLORIDE 0.9% IV SOLN 30 UNIT/500ML 600 ML/HR: 30-0.9/5 SOLUTION at 09:08

## 2023-03-24 RX ADMIN — SODIUM CHLORIDE, POTASSIUM CHLORIDE, SODIUM LACTATE AND CALCIUM CHLORIDE: 600; 310; 30; 20 INJECTION, SOLUTION INTRAVENOUS at 07:57

## 2023-03-24 RX ADMIN — ACETAMINOPHEN 975 MG: 325 TABLET ORAL at 14:01

## 2023-03-24 RX ADMIN — SENNOSIDES AND DOCUSATE SODIUM 2 TABLET: 50; 8.6 TABLET ORAL at 20:11

## 2023-03-24 RX ADMIN — SODIUM CHLORIDE, POTASSIUM CHLORIDE, SODIUM LACTATE AND CALCIUM CHLORIDE: 600; 310; 30; 20 INJECTION, SOLUTION INTRAVENOUS at 08:33

## 2023-03-24 RX ADMIN — KETOROLAC TROMETHAMINE 30 MG: 30 INJECTION, SOLUTION INTRAMUSCULAR at 09:37

## 2023-03-24 RX ADMIN — FENTANYL CITRATE 15 MCG: 50 INJECTION, SOLUTION INTRAMUSCULAR; INTRAVENOUS at 08:43

## 2023-03-24 RX ADMIN — BUPIVACAINE HYDROCHLORIDE IN DEXTROSE 1.4 ML: 7.5 INJECTION, SOLUTION SUBARACHNOID at 08:43

## 2023-03-24 RX ADMIN — ACETAMINOPHEN 975 MG: 325 TABLET ORAL at 07:57

## 2023-03-24 RX ADMIN — PHENYLEPHRINE HYDROCHLORIDE 50 MCG/MIN: 10 INJECTION INTRAVENOUS at 08:44

## 2023-03-24 RX ADMIN — Medication 2 G: at 08:45

## 2023-03-24 ASSESSMENT — ACTIVITIES OF DAILY LIVING (ADL)
ADLS_ACUITY_SCORE: 22
DOING_ERRANDS_INDEPENDENTLY_DIFFICULTY: NO
FALL_HISTORY_WITHIN_LAST_SIX_MONTHS: NO
DRESSING/BATHING_DIFFICULTY: NO
CONCENTRATING,_REMEMBERING_OR_MAKING_DECISIONS_DIFFICULTY: NO
CHANGE_IN_FUNCTIONAL_STATUS_SINCE_ONSET_OF_CURRENT_ILLNESS/INJURY: NO
ADLS_ACUITY_SCORE: 18
ADLS_ACUITY_SCORE: 18
ADLS_ACUITY_SCORE: 31
ADLS_ACUITY_SCORE: 18
ADLS_ACUITY_SCORE: 18
ADLS_ACUITY_SCORE: 22
WEAR_GLASSES_OR_BLIND: NO
ADLS_ACUITY_SCORE: 18
WALKING_OR_CLIMBING_STAIRS_DIFFICULTY: NO
DIFFICULTY_EATING/SWALLOWING: NO
TOILETING_ISSUES: NO
ADLS_ACUITY_SCORE: 18

## 2023-03-24 NOTE — OP NOTE
[facility]   OPERATIVE NOTE:  SECTION     Surgery Date:  2023  Surgeon(s): Kristal Arciniega MD  Assistants:  Nova Simon MD MPH PGY-2    Preoperative Diagnoses:  -  at 37w5d   - Di/di twin gestation  - Breech presentation of Twin B    Postoperative diagnoses:  -  at 37w5d, now delivered    Procedure performed:  Primary low segment transverse  section via Pfannenstiel skin incision with single layer uterine closure    Anesthesia:  Spinal   Est Blood Loss (mL): 879 mL  Fluid replacement: 600 mL crystalloid  Urine Output: see I&Os  Specimens: None  Complications: None apparent    Operative findings:   - Di/di twin gestation. Twin A was liveborn female infant at 0904 hours on 3/24/2023. Good tone and immediate cry.  Birth weight: 2.82 kg (6 lb 3.5 oz). Fetal presentation: cephalic. Amniotic fluid: clear.   Twin A was liveborn male infant at 0907 hours on 3/24/2023. Good tone and immediate cry. Birth weight: 3.14 kg (6 lb 14.8 oz).  Fetal presentation: breech. Amniotic fluid: clear.    - Two placentas, intact with 3 vessel cords.     - Normal appearing uterus, fallopian tubes, ovaries.   - No intraabdominal adhesions. No abdominal wall adhesions    Indication: Nova Matthews is a 32 year old, , who was admitted at 37w5 for scheduled primary  section for di/di twins in the setting of breech presentation of twin B. She was counseled on her options and declined a breech vaginal delivery. Recommendation was made for primary  section. The risks, benefits, and alternatives of  delivery were explained and the patient agreed to proceed.     Procedure details:  After obtaining informed consent, the patient was taken to the operating room. She was placed in the dorsal supine position with a leftward tilt and prepped and draped in the usual sterile fashion.     Following test of adequate spinal anesthesia, the abdomen was entered  through a transverse skin incision. The skin incision was made sharply and carried through the subcutaneous tissue to the fascia.  Fascia was incised in the midline and extended laterally with blunt dissecton.  The muscle was  in the midline.      The peritoneum was entered bluntly and the opening extended by digital dissection with care to avoid the bladder. A bladder blade was placed. An Junior O retractor was placed into the abdomen for visualization.The lower segment of the uterus was opened sharply in a transverse fashion and extended with digital pressure. The presenting twin was cephalic and noted to be in BHAVANI position. It was elevated to the level of the hysterotomy and was delivered atraumatically, shoulders delivered easily thereafter. The cord was doubly clamped with straight clamps after 60 seconds and cut and the infant was handed off to the waiting SCN staff. Baby B was noted to be in breech position. The infant's pelvis was elevated to the level of the hysterotomy and rupture of membranes was performed. The infant ws delivered using standard breech maneuvers. The cord was doubly clamped with curved clamps after 60 seconds and cut and the infant was handed off to the waiting SCN staff. The placenta was expressed from the uterus.  The uterus was left en situ and cleared of all clots and debris. The uterus was massaged. Pitocin was given through the running IV. The hysterotomy was repaired with 0-vicryl suture in a running locked fashion and good hemostasis was achieved. The uterus was rotated and there were bilateral normal fallopian tubes and ovaries.     The hysterotomy was again inspected and found to be hemostatic. The abdominal wall was examined and also found to be hemostatic. The fascia was closed with a running suture of 0-vicryl.  Subcutaneous tissue was irrigated. Areas that were not hemostatic were controlled with cautery. The skin was closed with 4-0 monocryl in a subcuticular  fashion. The patient tolerated the procedure well and was taken to the recovery room in stable condition. All sponge, needle and instrument counts were correct x2.     Dr. Arciniega was present for the entire procedure.     Nova Simon MD, MPH  Obstetrics and Gyncology, PGY-2  March 24, 2023, 9:47 AM      I participated in all aspects of Nova Matthews's case with Dr. Chan Simon on 3/24/2023 and agree with the operative details in this note with edits by me.     Kristal Arciniega MD

## 2023-03-24 NOTE — PROGRESS NOTES
Data: Nova Matthews transferred to [id number] via Zoom Cart at 11:45 AM. Baby transferred via parent's arms.  Action: Receiving unit notified of transfer: Yes. Patient and family notified of room change. Belongings sent to receiving unit. Accompanied by Registered Nurse. Oriented patient to surroundings. Call light within reach. ID bands double-checked with receiving RN.  Response: Patient tolerated transfer and is stable.

## 2023-03-24 NOTE — ANESTHESIA CARE TRANSFER NOTE
Patient: Nova Matthews    Procedure: Procedure(s):  PRIMARY  SECTION       Diagnosis: Dichorionic diamniotic twin pregnancy in second trimester [O30.042]  Diagnosis Additional Information: No value filed.    Anesthesia Type:   Spinal     Note:    Oropharynx: oropharynx clear of all foreign objects and spontaneously breathing  Level of Consciousness: awake  Oxygen Supplementation: room air    Independent Airway: airway patency satisfactory and stable  Dentition: dentition unchanged  Vital Signs Stable: post-procedure vital signs reviewed and stable  Report to RN Given: handoff report given  Patient transferred to: PACU    Handoff Report: Identifed the Patient, Identified the Reponsible Provider, Reviewed the pertinent medical history, Discussed the surgical course, Reviewed Intra-OP anesthesia mangement and issues during anesthesia, Set expectations for post-procedure period and Allowed opportunity for questions and acknowledgement of understanding      Vitals:  Vitals Value Taken Time   /72 23 1130   Temp 36.6  C (97.9  F) 23 1000   Pulse 67 23 1130   Resp 16 23 1045   SpO2 98 % 23 1130       Electronically Signed By: Diego Chavez MD  2023  12:51 PM

## 2023-03-24 NOTE — ANESTHESIA POSTPROCEDURE EVALUATION
Patient: Nova Matthews    Procedure: Procedure(s):  PRIMARY  SECTION       Anesthesia Type:  Spinal    Note:  Disposition: Inpatient   Postop Pain Control: Uneventful            Sign Out: Well controlled pain   PONV: No   Neuro/Psych: Uneventful            Sign Out: Acceptable/Baseline neuro status   Airway/Respiratory: Uneventful            Sign Out: Acceptable/Baseline resp. status   CV/Hemodynamics: Uneventful            Sign Out: Acceptable CV status; No obvious hypovolemia; No obvious fluid overload   Other NRE: NONE   DID A NON-ROUTINE EVENT OCCUR? No    Event details/Postop Comments:  Denies nausea. Questions re anesthesia answered           Last vitals:  Vitals Value Taken Time   /72 23 1130   Temp 36.6  C (97.9  F) 23 1000   Pulse 67 23 1130   Resp 16 23 1045   SpO2 98 % 23 1130       Electronically Signed By: Nova Gamble MD  2023  1:43 PM

## 2023-03-24 NOTE — PLAN OF CARE
Goal Outcome Evaluation:  Shift 4234-3515  Afebrile. VSS, except 2/10 incisional soreness. Fundus U2, scant, rubra lochia. LS clear on RA. Good PO intake, good appetite. Incision site is covered. Patient is using belly band and tolerating well. Koehler removed at 1612 and will continue to attempt to void, passing gas. Taking tylenol and toradol as needed. Bonding well with infants in room. Helping with infant cares. Patient is breast feeding every 2-3 hours. Plan to continue to monitor. Hourly monitoring completed, will continue to monitor.     Problem: Plan of Care - These are the overarching goals to be used throughout the patient stay.    Goal: Plan of Care Review  Description: The Plan of Care Review/Shift note should be completed every shift.  The Outcome Evaluation is a brief statement about your assessment that the patient is improving, declining, or no change.  This information will be displayed automatically on your shift note.  Outcome: Progressing  Goal: Absence of Hospital-Acquired Illness or Injury  Intervention: Prevent Skin Injury  Recent Flowsheet Documentation  Taken 3/24/2023 1155 by Saundra Rockwell RN  Body Position: position changed independently  Intervention: Prevent and Manage VTE (Venous Thromboembolism) Risk  Recent Flowsheet Documentation  Taken 3/24/2023 1155 by Saundra Rockwell RN  VTE Prevention/Management: SCDs (sequential compression devices) on  Goal: Optimal Comfort and Wellbeing  Outcome: Progressing  Intervention: Provide Person-Centered Care  Recent Flowsheet Documentation  Taken 3/24/2023 1155 by Saundra Rockwell RN  Trust Relationship/Rapport:    care explained    choices provided    emotional support provided    empathic listening provided    questions answered    questions encouraged    reassurance provided    thoughts/feelings acknowledged  Goal: Readiness for Transition of Care  Outcome: Progressing     Problem: Postpartum ( Delivery)  Goal: Successful Maternal Role  Transition  Outcome: Progressing  Goal: Hemostasis  Outcome: Progressing  Goal: Anesthesia/Sedation Recovery  Outcome: Progressing  Goal: Optimal Pain Control and Function  Outcome: Progressing  Goal: Nausea and Vomiting Relief  Outcome: Met  Goal: Effective Urinary Elimination  Outcome: Progressing  Goal: Effective Oxygenation and Ventilation  Outcome: Met  Intervention: Optimize Oxygenation and Ventilation  Recent Flowsheet Documentation  Taken 3/24/2023 1155 by Saundra Rockwell, RN  Head of Bed (HOB) Positioning: HOB at 45 degrees

## 2023-03-24 NOTE — H&P
History and Physical     Nova Matthews MRN# [id number]   YOB: 1990 Age: 32 year old      Date of Admission: 3/24/2023    Primary care provider: No Ref-Primary, Physician      Assessment and Plan:       32 year old  at 37w5d by ETD with di/di twins, here for elective primary  section for di/di twins and breech presentation of of Twin B. 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     # Scheduled Primary  Section  Indicated due to di/di twin gestation with breech presentation of twin B, declines breech extraction. - Labs: CBC, T&S, RPR   - Pre-op Hgb 10.9, Plts 186  - Anesthesia: Spinal   - 2g Ancef   - PPH Meds/Ppx: no contraindications  - Diet: NPO  - PPx: SCDs  - Consent: Discussed risks and benefits of procedure, including but not limited to bleeding, infection, injury to surrounding organs, injury to infant, and the potential need for another surgery should some injury go unrecognized or patient were to have continued bleeding. Patient had time to ask questions and expressed understanding of procedure and associated risks. Agreed to blood transfusion if necessary. Consent signed.    # H/o gHTN  No diagnosis in this pregnancy.   - Serial BP monitoring   - IV Antihypertensives prn for sustained severe range blood pressures (>160/>110)  - HELLP labs prn    # PNC  1) PNC: Rh positive, Kimmie negative, Rubella immune, Infectious labs wnl, early GCT 92. 28 week .   2) Genetic screening: NT normal x 2.  NIPT with no result for sex only (declined other testing).      # FWB:   Cat 1 tracing, reactive; cephalic and breech by BSUS  - Continuous Fetal Monitoring prior to OR   - NICU for delivery   - Intrauterine resuscitative measures prn    oNva Simon MD MPH  OB/Gyn Resident PGY-2  3/24/2023  8:28 AM      Patient discussed with Dr. Suze SANDS personally examined and evaluated Nova Matthews on 3/24/2023.  I  discussed the patient with Dr. Chan Simon and agree with the presentation, exam and plan of care documented in this note with edits by me.   Kristal Arciniega MD      HPI:     Rashad Anderson is a 32 year old  at 37w5d by ETD with di/di twins who presents today for scheduled primary  section.    Pregnancy notable for:   -IVF pregnancy  -di/di twins   -h/o PPH  -h/o gHTN  - Fetus B with mid muscular VSD     No h/o asthma     She reports good movement of both infants. Denies LOF, vaginal bleeding. Some intermittent contractions.  She denies fever, chills, SOB, chest pain, palpitations, N/V, LE swelling/tenderness.  No concerns for headache, vision changes, RUQ or epigastric pain      Patient has been NPO since midnight    OB History:    OB History    Para Term  AB Living   2 1 1 0 0 1   SAB IAB Ectopic Multiple Live Births   0 0 0 0 1      # Outcome Date GA Lbr Mejia/2nd Weight Sex Delivery Anes PTL Lv   2 Current            1 Term 21 38w6d 03:30 / 02:15 3.487 kg (7 lb 11 oz) M Vag-Spont EPI N AMADOR      Complications: Abruptio Placenta      Name: NICOLLE ANDERSON-RASHAD      Apgar1: 9  Apgar5: 9        Prenatal Lab Results:  Lab Results   Component Value Date    ABO O 2021    RH Pos 2021    AS Negative 2023    HEPBANG Nonreactive 2022    CHPCRT Negative 10/12/2020    GCPCRT Negative 10/12/2020    HGB 10.9 (L) 2023       GBS Status:   Lab Results   Component Value Date    GBS Negative 2021                Past Medical History:     Past Medical History:   Diagnosis Date     NO ACTIVE PROBLEMS              Past Surgical History:     Past Surgical History:   Procedure Laterality Date     EXTRACTION(S) DENTAL      as a child, never as an adult     HC HYSTEROSCOPY W ENDOMETRIAL BX/POLYPECTOMY W/WO D&C       HERNIA REPAIR Bilateral 1994    bilateral inguinal      KNEE SURGERY  2016    left knee surgery             Social History:     Social History      Tobacco Use     Smoking status: Never     Passive exposure: Never     Smokeless tobacco: Never   Substance Use Topics     Alcohol use: Not Currently             Family History:     Family History   Problem Relation Age of Onset     Hypertension Mother 50     Hyperthyroidism Mother 13     Skin Cancer Father 50     No Known Problems Sister      Alzheimer Disease Maternal Grandmother 84     Hypertension Maternal Grandfather 60     Alzheimer Disease Paternal Grandmother 80             Immunizations:     Immunization History   Administered Date(s) Administered     COVID-19 Vaccine 12+ (Pfizer) 01/13/2021, 02/01/2021, 11/18/2021     Flu, Unspecified 10/21/2016     HPV Quadrivalent 03/18/2008, 05/20/2008, 10/02/2008     Influenza (IIV3) PF 09/26/2012     Influenza Vaccine >6 months (Alfuria,Fluzone) 10/01/2014, 10/01/2015, 09/28/2020, 09/21/2021, 10/14/2022     Meningococcal ACWY (Menactra ) 04/05/2007     TDAP (Adacel,Boostrix) 08/23/2011, 02/24/2021, 01/12/2023     Varicella 02/11/2020, 03/17/2020            Allergies:   No Known Allergies          Medications:     Medications Prior to Admission   Medication Sig Dispense Refill Last Dose     aspirin (ASA) 81 MG EC tablet Take 1 tablet by mouth daily   Past Week     Cholecalciferol (VITAMIN D-3) 125 MCG (5000 UT) TABS    3/23/2023     cyclobenzaprine (FLEXERIL) 5 MG tablet Take 1 tablet (5 mg) by mouth 3 times daily as needed (pain) 20 tablet 0 More than a month     folic acid (FOLVITE) 1 MG tablet Take 1 tablet (1 mg) by mouth daily 90 tablet 3 3/23/2023     hydrOXYzine (VISTARIL) 25 MG capsule Take 1-2 capsules (25-50 mg) by mouth 3 times daily as needed for other (pain, sleep) 20 capsule 0 Past Week     metoclopramide (REGLAN) 5 MG tablet Take 1 tablet (5 mg) by mouth 3 times daily as needed (nausea) 30 tablet 1 More than a month     omeprazole (PRILOSEC) 20 MG DR capsule Take 1 capsule (20 mg) by mouth daily for 60 days 90 capsule 1 3/23/2023     Prenatal Vit-Fe  Fumarate-FA (PRENATAL MULTIVITAMIN W/IRON) 27-0.8 MG tablet Take 1 tablet by mouth daily   More than a month     pyridOXINE (VITAMIN B6) 25 MG tablet Take 1 tablet (25 mg) by mouth daily 90 tablet 3 More than a month             Review of Systems & Physical Exam:     The Review of Systems is negative other than noted in the HPI      /78   Pulse 78   Temp 98.4  F (36.9  C) (Oral)   Breastfeeding No   Gen: NAD  CV: well perfused   Lungs: nasal congestion, no increased work of breathing  Abd: Gravid, non-tender, non-distended  Ext: traceperipheral extremity edema    Presentation: Baby A cephalic, Baby B breech by BSUS     FHT:  Monitoring External  FHT: Baby A: Baseline 140 bpm; moderate variability; accels present; no decelerations; Baby b: Baseline 145 bpm; moderate variability; accels present; no decelerations  TOCO 1 contractions in 10 minutes         Data:      Level II US: Fetus 1: Cephalic, R presenting, Normal with some suboptimal cardiac views, EFW 67%ile AC 65%ile, GIRL. Fetus 2 Cephalic, Left, superior, Normal anatomy, EFW 65%ile, AC 69%ile. 0.8% discordance, BOY.  Repeat 12/8/22 with all normal views.    Lab Results   Component Value Date    WBC 10.0 03/24/2023    WBC 10.9 04/05/2021     Lab Results   Component Value Date    RBC 4.06 03/24/2023    RBC 3.08 04/05/2021     Lab Results   Component Value Date    HGB 10.9 03/24/2023    HGB 8.3 04/05/2021     Lab Results   Component Value Date    HCT 34.4 03/24/2023    HCT 26.7 04/05/2021     No components found for: MCT  Lab Results   Component Value Date    MCV 85 03/24/2023    MCV 87 04/05/2021     Lab Results   Component Value Date    MCH 26.8 03/24/2023    MCH 26.9 04/05/2021     Lab Results   Component Value Date    MCHC 31.7 03/24/2023    MCHC 31.1 04/05/2021     Lab Results   Component Value Date    RDW 13.4 03/24/2023    RDW 15.7 04/05/2021     Lab Results   Component Value Date     03/24/2023     04/05/2021

## 2023-03-24 NOTE — DISCHARGE SUMMARY
[facility]   Discharge Summary    Nova Matthews MRN# [id number]   Age: 32 year old YOB: 1990     Date of Admission:  3/24/2023  Date of Discharge::  3/26/23  Admitting Physician:  Kristal Arciniega MD  Discharge Physician:  Rhoda Beckford MD             Admission Diagnoses:   -   - di/di twin pregnancy at 37w5d by ETD  - Breech position of Twin B with mid-muscular VSD  - h/o PPH  - h/o gHTN            Discharge Diagnosis:     Same, delivered             Procedures:     Procedure(s): Primary low transverse  section with single layer closure via Pfannenstiel skin incision  Spinal anesthesia, TAP block                Medications Prior to Admission:     Medications Prior to Admission   Medication Sig Dispense Refill Last Dose     Cholecalciferol (VITAMIN D-3) 125 MCG (5000 UT) TABS    3/23/2023     hydrOXYzine (VISTARIL) 25 MG capsule Take 1-2 capsules (25-50 mg) by mouth 3 times daily as needed for other (pain, sleep) 20 capsule 0 Past Week     omeprazole (PRILOSEC) 20 MG DR capsule Take 1 capsule (20 mg) by mouth daily for 60 days 90 capsule 1 3/23/2023     Prenatal Vit-Fe Fumarate-FA (PRENATAL MULTIVITAMIN W/IRON) 27-0.8 MG tablet Take 1 tablet by mouth daily   More than a month     [DISCONTINUED] aspirin (ASA) 81 MG EC tablet Take 1 tablet by mouth daily   Past Week     [DISCONTINUED] cyclobenzaprine (FLEXERIL) 5 MG tablet Take 1 tablet (5 mg) by mouth 3 times daily as needed (pain) 20 tablet 0 More than a month     [DISCONTINUED] folic acid (FOLVITE) 1 MG tablet Take 1 tablet (1 mg) by mouth daily 90 tablet 3 3/23/2023     [DISCONTINUED] metoclopramide (REGLAN) 5 MG tablet Take 1 tablet (5 mg) by mouth 3 times daily as needed (nausea) 30 tablet 1 More than a month     [DISCONTINUED] pyridOXINE (VITAMIN B6) 25 MG tablet Take 1 tablet (25 mg) by mouth daily 90 tablet 3 More than a month             Discharge Medications:           Review of your medicines       START taking      Dose / Directions   acetaminophen 325 MG tablet  Commonly known as: TYLENOL  Used for:  delivery delivered      Dose: 975 mg  Take 3 tablets (975 mg) by mouth every 6 hours  Quantity: 60 tablet  Refills: 0     ibuprofen 800 MG tablet  Commonly known as: ADVIL/MOTRIN  Used for:  delivery delivered      Dose: 800 mg  Take 1 tablet (800 mg) by mouth every 6 hours  Quantity: 60 tablet  Refills: 0     oxyCODONE 5 MG tablet  Commonly known as: ROXICODONE  Used for:  delivery delivered      Dose: 5 mg  Take 1 tablet (5 mg) by mouth every 4 hours as needed for moderate pain (4-6) or moderate to severe pain  Quantity: 8 tablet  Refills: 0     senna-docusate 8.6-50 MG tablet  Commonly known as: SENOKOT-S/PERICOLACE  Used for:  delivery delivered      Dose: 1 tablet  Take 1 tablet by mouth 2 times daily  Quantity: 60 tablet  Refills: 0        CONTINUE these medicines which have NOT CHANGED      Dose / Directions   hydrOXYzine 25 MG capsule  Commonly known as: VISTARIL  Used for: Rib pain on left side      Dose: 25-50 mg  Take 1-2 capsules (25-50 mg) by mouth 3 times daily as needed for other (pain, sleep)  Quantity: 20 capsule  Refills: 0     omeprazole 20 MG DR capsule  Commonly known as: priLOSEC  Used for: Gastroesophageal reflux disease, unspecified whether esophagitis present      Dose: 20 mg  Take 1 capsule (20 mg) by mouth daily for 60 days  Quantity: 90 capsule  Refills: 1     prenatal multivitamin w/iron 27-0.8 MG tablet      Dose: 1 tablet  Take 1 tablet by mouth daily  Refills: 0     Vitamin D-3 125 MCG (5000 UT) Tabs      Refills: 0        STOP taking    aspirin 81 MG EC tablet  Commonly known as: ASA        cyclobenzaprine 5 MG tablet  Commonly known as: FLEXERIL        folic acid 1 MG tablet  Commonly known as: FOLVITE        metoclopramide 5 MG tablet  Commonly known as: REGLAN        pyridOXINE 25 MG tablet  Commonly known as: VITAMIN B6              Where  to get your medicines      These medications were sent to [facility] [place], [place] - [address]  [address], [facility] [address]    Phone: [phone]     acetaminophen 325 MG tablet    ibuprofen 800 MG tablet    oxyCODONE 5 MG tablet    senna-docusate 8.6-50 MG tablet            Consultations:   Anesthesia  NICU for delivery          Brief Admission History:   Ms. Nova Matthews is a 32 year old now  who initially presented at #7w5d by ETD for scheduled primary  section for di/di twins and breech presentation of Twin B with mid-muscular VSD. Pregnancy is notable for IVF pregnancy and h/o PPH and gHTN.       Intraoperative course   The procedure was uncomplicated.   mL.  See operative report for details.     Operative findings:   - Di/di twin gestation. Twin A was liveborn female infant at 0904 hours on 3/24/2023. Good tone and immediate cry.  Birth weight: 2.82 kg (6 lb 3.5 oz). Fetal presentation: cephalic. Amniotic fluid: clear.   Twin A was liveborn male infant at 0907 hours on 3/24/2023. Good tone and immediate cry. Birth weight: 3.14 kg (6 lb 14.8 oz).  Fetal presentation: breech. Amniotic fluid: clear.    - Two placentas, intact with 3 vessel cords.     - Normal appearing uterus, fallopian tubes, ovaries.   - No intraabdominal adhesions. No abdominal wall adhesions       Postpartum Course   The patient's hospital course was unremarkable.  She recovered as anticipated and experienced no post-operative complications.  On discharge, her pain was well controlled. Vaginal bleeding is similar to peak menstrual flow.  Voiding without difficulty.  Ambulating well, tolerating a normal diet, and has resumption of bowel function.  No fever or significant wound drainage.  Breastfeeding well.  Infant is stable. She was discharged on post-partum day #2.    Post-partum hemoglobin:   Hemoglobin   Date Value Ref Range Status   2023 10.0 (L) 11.7 - 15.7 g/dL  Final   04/05/2021 8.3 (L) 11.7 - 15.7 g/dL Final     Contraception: aware of spacing, not interested in medications  Rh positive, no Rhogam indicated.  Rubella immune, no MMR indicated.          Discharge Instructions and Follow-Up:     Discharge diet: Regular   Discharge activity: No lifting greater than 20 lbs, pushing, pulling, or other strenuous activity for 6 weeks. Pelvic rest for 6 weeks including no sexual intercourse, tampons, or douching. No driving until you can slam on the breaks without pain or while on narcotic pain medications.    Discharge follow-up: Follow up with primary OB for routine postpartum visit in 6 weeks     Wound care: Keep incision clean and dry           Discharge Disposition:     Discharged to home     Edi Pickens DO, MA  [email or web address]

## 2023-03-24 NOTE — CARE PLAN
Data: Noav Matthews transferred to [id number] via cart at 1130. Baby transferred via parent's arms.  Action: Receiving unit notified of transfer: Yes. Patient and family notified of room change. Report given to Saundra AYALA RN at 1200. Belongings sent to receiving unit. Accompanied by Registered Nurse. Oriented patient to surroundings. Call light within reach. ID bands double-checked with receiving RN.  Response: Patient tolerated transfer and is stable.

## 2023-03-24 NOTE — DISCHARGE INSTRUCTIONS
Postop  Birth Instructions    Activity     Do not lift more than 10-15 pounds for 6 weeks after surgery.  Ask family and friends for help when you need it.  No driving until you have stopped taking your pain medications (usually two weeks after surgery).  No heavy exercise or activity for 6 weeks.  Don't do anything that will put a strain on your surgery site.  Don't strain when using the toilet.  Your care team may prescribe a stool softener if you have problems with your bowel movements.     To care for your incision:     Keep the incision clean and dry.  Do not soak your incision in water. No swimming or hot tubs until it has fully healed. You may soak in the bathtub if the water level is below your incision.  Do not use peroxide, gel, cream, lotion, or ointment on your incision.  Adjust your clothes to avoid pressure on your surgery site (check the elastic in your underwear for example).     You may see a small amount of clear or pink drainage and this is normal.  Check with your health care provider:     If the drainage increases or has an odor.  If the incision reddens, you have swelling, or develop a rash.  If you have increased pain and the medicine we prescribed doesn't help.  If you have a fever above 100.4 F (38 C) with or without chills when placing thermometer under your tongue.   The area around your incision (surgery wound), will feel numb.  This is normal. The numbness should go away in less than a year.     Keep your hands clean:  Always wash your hands before touching your incision (surgery wound). This helps reduce your risk of infection. If your hands aren't dirty, you may use an alcohol hand-rub to clean your hands. Keep your nails clean and short.    Call your healthcare provider if you have any of these symptoms:     You soak a sanitary pad with blood within 1 hour, or you see blood clots larger than a golf ball.  Bleeding that lasts more than 6 weeks.  Vaginal discharge that smells  bad.  Severe pain, cramping or tenderness in your lower belly area.  A need to urinate more frequently (use the toilet more often), more urgently (use the toilet very quickly), or it burns when you urinate.  Nausea and vomiting.  Redness, swelling or pain around a vein in your leg.  Problems breastfeeding or a red or painful area on your breast.  Chest pain and cough or are gasping for air.  Problems with coping with sadness, anxiety or depression. If you have concerns about hurting yourself or the baby, call your provider immediately.    You have questions or concerns after you return home.       Discharge Instructions for  Section ()   You had a  section, also called a . During the , your baby was delivered through an incision in your stomach and uterus. Full recovery after a  can take time. It s important to take care of yourself -- for your own sake and because your new baby needs you. Here are some guidelines to follow at home.   Incision care  Here's how to take care of your incision:   Shower as needed. Pat your incision dry.  Watch your incision for signs of infection, such as more redness or drainage.  Hold a pillow against the incision when you laugh or cough and when you get up from a lying or sitting position.  Remember, it can take as long as  6 weeks for your incision to heal.  Activity  Here are some suggestions:   Don t try to take care of anyone other than your baby and yourself.  Remember, the more active you are, the more likely you are to have an increase in your bleeding.  Get lots of rest. Take naps in the afternoon.  Increase your activities bit by bit.  Plan your activities so that you don t have to go up or down stairs more than needed.  Do postsurgical deep breathing and coughing exercises. Ask your healthcare provider for instructions.  Don t lift anything heavier than your baby until your healthcare provider tells you it s OK.  Don t  drive until your healthcare provider says it s OK.  Don t have sex until after you ve had a checkup with your healthcare provider and you have decided on a birth control method.  Let others do things for you. Don't hesitate to ask for help.  Follow-up  Make a follow-up appointment as directed by our staff.   When to call your healthcare provider  Call your healthcare provider right away if you have any of these:   Fever of  100.4  F ( 38 C) or higher  Redness, pain, or drainage at your incision site  Bleeding that requires a new sanitary pad every hour. Heavy vaginal bleeding may be a sign of postpartum hemorrhage. It needs medical care right away.  Severe belly pain  Pain or urgency with urination  Foul odor from vaginal discharge  Trouble urinating or emptying your bladder  No bowel movement within 1 week after the birth of your baby  Swollen, red, painful area in the leg  Appearance of rash or hives  Sore, red, painful area on the breasts that may come with flu-like symptoms  Feelings of anxiety, panic, or depression  [employer] last reviewed this educational content on 7/1/2021 2000-2022 [employer]. All rights reserved. This information is not intended as a substitute for professional medical care. Always follow your healthcare professional's instructions.

## 2023-03-24 NOTE — ANESTHESIA PROCEDURE NOTES
"TAP Procedure Note    Pre-Procedure   Staff -        Anesthesiologist:  Nova Gamble MD       Resident/Fellow: Diego Chavez MD       Performed By: resident       Location: pre-op       Pre-Anesthestic Checklist: patient identified, IV checked, site marked, risks and benefits discussed, informed consent, monitors and equipment checked, pre-op evaluation, at physician/surgeon's request and post-op pain management  Timeout:       Correct Patient: Yes        Correct Procedure: Yes        Correct Site: Yes        Correct Position: Yes        Correct Laterality: Yes        Site Marked: Yes  Procedure Documentation  Procedure: TAP       Diagnosis: POST OP PAIN CONTROL       Laterality: bilateral       Patient Position: supine       Skin prep: Chloraprep       Insertion Site: L3-4.       Needle Type: insulated       Needle Gauge: 21.        Needle Length (Inches): 3.13        Ultrasound guided       1. Ultrasound was used to identify targeted nerve, plexus, vascular marker, or fascial plane and place a needle adjacent to it in real-time.       2. Ultrasound was used to visualize the spread of anesthetic in close proximity to the above referenced structure.       3. A permanent image is entered into the patient's record.       4. The visualized anatomic structures appeared normal.       5. There were no apparent abnormal pathologic findings.    Assessment/Narrative         The placement was negative for: blood aspirated, painful injection and site bleeding       Paresthesias: No.       Insertion/Infusion Method: Single Shot       Complications: none    Medication(s) Administered   Bupivacaine 0.25% PF (Infiltration) - Infiltration   20 mL - 3/24/2023 9:52:00 AM  Bupivacaine liposome (Exparel) 1.3% LA inj susp (Infiltration) - Infiltration   20 mL - 3/24/2023 9:52:00 AM    FOR [facility] ([facility]) ONLY:   Pain Team Contact information: please page the Pain Team Via [employer]. Search \"Pain\". During daytime hours, please " page the attending first. At night please page the resident first.

## 2023-03-24 NOTE — ANESTHESIA PROCEDURE NOTES
"Intrathecal injection Procedure Note    Pre-Procedure   Staff -        Anesthesiologist:  Nova Gamble MD       Resident/Fellow: Diego Chavez MD       Performed By: resident       Location: OR       Pre-Anesthestic Checklist: patient identified, IV checked, risks and benefits discussed, informed consent, monitors and equipment checked, pre-op evaluation, at physician/surgeon's request and post-op pain management  Timeout:       Correct Patient: Yes        Correct Procedure: Yes        Correct Site: Yes        Correct Position: Yes   Procedure Documentation  Procedure: intrathecal injection       Diagnosis: analgesia       Patient Position: sitting       Patient Prep/Sterile Barriers: sterile gloves, mask, patient draped       Skin prep: Chloraprep       Insertion Site: L3-4. (midline approach).       Needle Gauge: 25.        Needle Length (Inches): 3.5        Spinal Needle Type: Pencan       Introducer used       Introducer: 20 G       # of attempts: 1 and  # of redirects:  1    Assessment/Narrative         Paresthesias: Yes and Resolved.       Sensory Level: T6       CSF fluid: clear.       Opening pressure was cmH2O while  Sitting.      Medication(s) Administered   0.75% Hyperbaric Bupivacaine (Intrathecal) - Intrathecal   1.4 mL - 3/24/2023 8:43:00 AM  Fentanyl PF (Intrathecal) - Intrathecal   15 mcg - 3/24/2023 8:43:00 AM  Morphine PF 1 mg/mL (Intrathecal) - Intrathecal   0.15 mg - 3/24/2023 8:43:00 AM   Comments:  1.6cc of bupivicaine was drawn up but about 0.2cc was lost due to leak between syringe and spinal needle      FOR [facility] ([facility]) ONLY:   Pain Team Contact information: please page the Pain Team Via [employer]. Search \"Pain\". During daytime hours, please page the attending first. At night please page the resident first.    "

## 2023-03-25 LAB — HGB BLD-MCNC: 10 G/DL (ref 11.7–15.7)

## 2023-03-25 PROCEDURE — 85018 HEMOGLOBIN: CPT

## 2023-03-25 PROCEDURE — 250N000013 HC RX MED GY IP 250 OP 250 PS 637: Performed by: STUDENT IN AN ORGANIZED HEALTH CARE EDUCATION/TRAINING PROGRAM

## 2023-03-25 PROCEDURE — 250N000011 HC RX IP 250 OP 636

## 2023-03-25 PROCEDURE — 250N000013 HC RX MED GY IP 250 OP 250 PS 637

## 2023-03-25 PROCEDURE — 120N000002 HC R&B MED SURG/OB UMMC

## 2023-03-25 PROCEDURE — 36415 COLL VENOUS BLD VENIPUNCTURE: CPT

## 2023-03-25 RX ORDER — IBUPROFEN 800 MG/1
800 TABLET, FILM COATED ORAL EVERY 6 HOURS
Qty: 60 TABLET | Refills: 0 | Status: SHIPPED | OUTPATIENT
Start: 2023-03-25

## 2023-03-25 RX ORDER — AMOXICILLIN 250 MG
1 CAPSULE ORAL 2 TIMES DAILY
Qty: 60 TABLET | Refills: 0 | Status: SHIPPED | OUTPATIENT
Start: 2023-03-25 | End: 2023-12-13

## 2023-03-25 RX ORDER — OXYCODONE HYDROCHLORIDE 5 MG/1
5 TABLET ORAL EVERY 4 HOURS PRN
Qty: 8 TABLET | Refills: 0 | Status: SHIPPED | OUTPATIENT
Start: 2023-03-25 | End: 2023-12-13

## 2023-03-25 RX ORDER — ACETAMINOPHEN 325 MG/1
975 TABLET ORAL EVERY 6 HOURS
Qty: 60 TABLET | Refills: 0 | Status: SHIPPED | OUTPATIENT
Start: 2023-03-25 | End: 2023-12-13

## 2023-03-25 RX ORDER — LIDOCAINE 4 G/G
2 PATCH TOPICAL
Status: DISCONTINUED | OUTPATIENT
Start: 2023-03-25 | End: 2023-03-26 | Stop reason: HOSPADM

## 2023-03-25 RX ORDER — GUAIFENESIN 200 MG/1
200 TABLET ORAL EVERY 4 HOURS PRN
Status: DISCONTINUED | OUTPATIENT
Start: 2023-03-25 | End: 2023-03-26 | Stop reason: HOSPADM

## 2023-03-25 RX ADMIN — SIMETHICONE 80 MG: 80 TABLET, CHEWABLE ORAL at 20:20

## 2023-03-25 RX ADMIN — SIMETHICONE 80 MG: 80 TABLET, CHEWABLE ORAL at 03:04

## 2023-03-25 RX ADMIN — ACETAMINOPHEN 975 MG: 325 TABLET ORAL at 20:21

## 2023-03-25 RX ADMIN — LIDOCAINE PATCH 4% 2 PATCH: 40 PATCH TOPICAL at 20:18

## 2023-03-25 RX ADMIN — ACETAMINOPHEN 975 MG: 325 TABLET ORAL at 14:58

## 2023-03-25 RX ADMIN — ACETAMINOPHEN 975 MG: 325 TABLET ORAL at 08:45

## 2023-03-25 RX ADMIN — SENNOSIDES AND DOCUSATE SODIUM 2 TABLET: 50; 8.6 TABLET ORAL at 20:19

## 2023-03-25 RX ADMIN — IBUPROFEN 800 MG: 800 TABLET, FILM COATED ORAL at 11:28

## 2023-03-25 RX ADMIN — SENNOSIDES AND DOCUSATE SODIUM 2 TABLET: 50; 8.6 TABLET ORAL at 08:45

## 2023-03-25 RX ADMIN — GUAIFENESIN 200 MG: 200 TABLET ORAL at 18:43

## 2023-03-25 RX ADMIN — IBUPROFEN 800 MG: 800 TABLET, FILM COATED ORAL at 17:45

## 2023-03-25 RX ADMIN — GUAIFENESIN 200 MG: 200 TABLET ORAL at 23:04

## 2023-03-25 RX ADMIN — ACETAMINOPHEN 975 MG: 325 TABLET ORAL at 03:04

## 2023-03-25 RX ADMIN — SIMETHICONE 80 MG: 80 TABLET, CHEWABLE ORAL at 14:58

## 2023-03-25 RX ADMIN — IBUPROFEN 800 MG: 800 TABLET, FILM COATED ORAL at 23:04

## 2023-03-25 RX ADMIN — KETOROLAC TROMETHAMINE 30 MG: 30 INJECTION, SOLUTION INTRAMUSCULAR; INTRAVENOUS at 03:40

## 2023-03-25 RX ADMIN — SIMETHICONE 80 MG: 80 TABLET, CHEWABLE ORAL at 08:45

## 2023-03-25 ASSESSMENT — ACTIVITIES OF DAILY LIVING (ADL)
ADLS_ACUITY_SCORE: 18

## 2023-03-25 NOTE — PROGRESS NOTES
[facility]  Obstetrics Postpartum Note  POD#1 s/p scheduled PLTCS di/di twins at 37w5d    S: Patient doing well overall.  Pain well controlled.  Tolerating regular diet without nausea or vomiting.  Ambulating without dizziness.  Lochia similar to menses, slightly increased volume after feeding. Passing flatus, no BM yet. Voiding spontaneously. Breastfeeding, going well without concerns.  No symptoms of pre-e. No concerns about incision.    O:   Patient Vitals for the past 24 hrs:   BP Temp Temp src Pulse Resp SpO2   23 0837 108/73 97.7  F (36.5  C) Oral 67 16 100 %   23 0400 103/78 97.7  F (36.5  C) Oral 78 16 100 %   23 0005 106/72 97.6  F (36.4  C) Oral 62 16 100 %   23 2013 113/83 -- -- 62 16 100 %   23 1600 114/76 97.9  F (36.6  C) Oral 72 16 100 %   23 1457 115/78 97.8  F (36.6  C) Oral 71 16 100 %   23 1401 110/67 97.9  F (36.6  C) Oral 68 16 100 %   23 1300 112/81 -- -- 60 16 99 %     I/O last 3 completed shifts:  In: 16 [I.V.:16]  Out: 4233 [Urine:3250; Blood:983]    Gen:  Resting comfortably, NAD  CV:  RRR  Pulm:  CTAB, no wheezes  Abd:  Soft, appropriately TTP, non-distended. +BS.  Fundus firm  Inc: Bandage in place, no erythema or drainage  Ext:  non-tender, trace edema bilaterally    Hgb:   Hemoglobin   Date Value Ref Range Status   2023 10.0 (L) 11.7 - 15.7 g/dL Final   2023 10.9 (L) 11.7 - 15.7 g/dL Final   2021 8.3 (L) 11.7 - 15.7 g/dL Final   2021 7.7 (L) 11.7 - 15.7 g/dL Final     Assessment/Plan:  32 year old  on POD #1 s/p scheduled PLTCS for di/di breech twins.    PP: Continue with routine postpartum management   Rh positive, Rubella immune   S/p Tdap and influenza vaccination antenatally   Infant Nutrition: Breastfeeding   Contraception: TBD  Pain: Continue PO meds (tylenol 975mg q6, ibuprofen 800mg q6, also oxycodone available but pt not using at this time)  Heme: Hgb 10.9 >  > Hgb  10.0, VSS  FEN/GI: Tolerating regular diet, continue bowel regimen with Senna-docusate  :   Voiding spontaneously  PPX: Ambulation for DVT ppx     Dispo: Advancing well postoperatively, anticipate discharge POD#2    Angelica Driscoll, MS3  [employer]     I appreciate the note above by medical student, Angelica Driscoll.  I was present with the medical student who participated in the service and in the documentation of the note. I have verified the history and personally performed the physical exam and medical decision making. I agree with the assessment and plan of care as documented in the note. Doing well POD#1, will send discharge meds to pharmacy today in anticipation of discharge 3/26/23.  Ruthie Maria MD MPH

## 2023-03-25 NOTE — PROGRESS NOTES
Post  Anesthesia Follow Up Note    Patient: Nova Matthews    Patient location: Postpartum floor.      Procedure(s) Performed:  Procedure(s):  PRIMARY  SECTION    Anesthesia type: Spinal Block    Subjective:     Pain is well controlled.     Additional ROS:  She does not complain of pruritis at this time. She denies weakness, denies paresthesia, denies difficulties breathing or voiding, denies nausea or vomiting. She is able to ambulate and tolerates regular diet.    Objective:  Vitals stable  - Epidural site healing appropriately, clean/dry/intact.  - No swelling, erythema, drainage.   - Minimally tender to palpation.  - Moving BL LEs spontaneously.     Last Vitals: /73 (Patient Position: Sitting)   Pulse 67   Temp 36.5  C (97.7  F) (Oral)   Resp 16   SpO2 100%   Breastfeeding Unknown     Assessment and plan:   Nova Matthews is a 32 year old female  POD #1 s/p   and single shot TAP nerve block injections. There is no evidence of adverse side effects associated with spinal and nerve block injections.    Pain is well controlled.    Thank you for including us in the care for this patient.    Jeffrey Herzog MD  CA-2  Anesthesia

## 2023-03-25 NOTE — PROVIDER NOTIFICATION
03/25/23 1752   Provider Notification   Provider Name/Title LIAM Means   Method of Notification Electronic Page   Request Evaluate-Remote   Notification Reason Medication Request     pt has a productive cough that causes incisional pain.  Can she have lidocaine patches and something for the cough? thx

## 2023-03-25 NOTE — PLAN OF CARE
Goal Outcome Evaluation:    VSS. Denies chest pain, SOB, nausea, dizziness. Up ad brittny and voiding since duckworth removal. PP assessments WDL. Incision is covered, dressing CDI. Using abdominal binder for comfort. Taking tylenol and toradol for pain control. Pt is breast feeding the babies independently and is tandem feeding them as well. , Pro, is present and supportive. Great bonding observed with the twins. Anticipate discharge to home POD 2-3.     Continue to assess and assist as needed per POC.

## 2023-03-25 NOTE — PLAN OF CARE
Goal Outcome Evaluation:      Plan of Care Reviewed With: patient    Overall Patient Progress: improvingOverall Patient Progress: improving       Data: VSS, postpartum assessments WNL. She is voiding without difficulty, up ad brittny, eating and drinking without nausea. Incision appears to be healing well, lochia WNL, no s/s infection. Breastfeeding infants with very minimal assistance. Taking ibuprofen and tylenol for incisional/uterine pain and Pt reports good pain management.   Action: Education provided on plan of care  Response: Pt is agreeable with her plan of care. Positive attachment behaviors observed with infants. Support person, , present. Anticipate discharge per care plan.

## 2023-03-25 NOTE — PLAN OF CARE
Problem: Plan of Care - These are the overarching goals to be used throughout the patient stay.    Goal: Optimal Comfort and Wellbeing  Outcome: Progressing  Intervention: Monitor Pain and Promote Comfort  Recent Flowsheet Documentation  Taken 3/25/2023 4461 by Marita Barlow, RN  Pain Management Interventions: medication (see MAR)  Taken 3/25/2023 0837 by Marita Barlow, RN  Pain Management Interventions: (scheduled tylenol) medication (see MAR)   Goal Outcome Evaluation:      Plan of Care Reviewed With: patient    Overall Patient Progress: improvingOverall Patient Progress: improving  VSS. Pain managed with scheduled medications. Up independently in room. Tolerating regular diet. Up in chair most of shift. Independent with breastfeeding.

## 2023-03-26 VITALS
SYSTOLIC BLOOD PRESSURE: 106 MMHG | TEMPERATURE: 98.1 F | HEART RATE: 69 BPM | RESPIRATION RATE: 16 BRPM | OXYGEN SATURATION: 100 % | DIASTOLIC BLOOD PRESSURE: 75 MMHG

## 2023-03-26 PROCEDURE — 250N000013 HC RX MED GY IP 250 OP 250 PS 637

## 2023-03-26 PROCEDURE — 250N000013 HC RX MED GY IP 250 OP 250 PS 637: Performed by: STUDENT IN AN ORGANIZED HEALTH CARE EDUCATION/TRAINING PROGRAM

## 2023-03-26 RX ADMIN — IBUPROFEN 800 MG: 800 TABLET, FILM COATED ORAL at 06:02

## 2023-03-26 RX ADMIN — GUAIFENESIN 200 MG: 200 TABLET ORAL at 08:09

## 2023-03-26 RX ADMIN — SIMETHICONE 80 MG: 80 TABLET, CHEWABLE ORAL at 06:02

## 2023-03-26 RX ADMIN — SENNOSIDES AND DOCUSATE SODIUM 2 TABLET: 50; 8.6 TABLET ORAL at 08:09

## 2023-03-26 RX ADMIN — ACETAMINOPHEN 975 MG: 325 TABLET ORAL at 10:05

## 2023-03-26 RX ADMIN — ACETAMINOPHEN 975 MG: 325 TABLET ORAL at 03:50

## 2023-03-26 ASSESSMENT — ACTIVITIES OF DAILY LIVING (ADL)
ADLS_ACUITY_SCORE: 18

## 2023-03-26 NOTE — PLAN OF CARE
Goal Outcome Evaluation [id number]:    VSS. Pain is well managed with current medications - see MAR. PP assessment WDL. Fundus is midline and firm without massages. Lochia is scant. Pt is ambulating and voiding spontaneously. She tolerates fluids and regular diet without nausea and vomiting. Pt is doing well with breastfeeding and hand expressing - she is needing no help from RN. Discharge paperwork are all completed.  is at bedside and attentive.    Continue with plan of care.

## 2023-03-26 NOTE — PROGRESS NOTES
[facility]  Obstetrics Postpartum Note  POD#2 s/p scheduled PLTCS di/di twins at 37w5d    S: Patient doing well overall.  Pain well controlled.  Tolerating regular diet without nausea or vomiting.  Ambulating without dizziness.  Lochia similar to menses. Passing flatus, no BM yet. Voiding spontaneously. Breastfeeding, going well without concerns.  No symptoms of pre-e. No concerns about incision. Wants to go home    O:   Patient Vitals for the past 24 hrs:   BP Temp Temp src Pulse Resp SpO2   23 0605 106/75 98.1  F (36.7  C) Oral 69 16 --   23 2258 120/80 97.7  F (36.5  C) Oral 80 16 --   23 1640 118/72 98.9  F (37.2  C) Oral 84 16 --   23 0837 108/73 97.7  F (36.5  C) Oral 67 16 100 %     No intake/output data recorded.    Gen:  Resting comfortably, NAD  CV:  RRR  Pulm:  CTAB, no wheezes  Abd:  Soft, appropriately TTP, non-distended.  Fundus firm  Inc: C/D/I  Ext:  non-tender, trace edema bilaterally    Hgb:   Hemoglobin   Date Value Ref Range Status   2023 10.0 (L) 11.7 - 15.7 g/dL Final   2023 10.9 (L) 11.7 - 15.7 g/dL Final   2021 8.3 (L) 11.7 - 15.7 g/dL Final   2021 7.7 (L) 11.7 - 15.7 g/dL Final     Assessment/Plan:  32 year old  on POD #2 s/p scheduled PLTCS for di/di breech twins.    PP: Continue with routine postpartum management   Rh positive, Rubella immune   S/p Tdap and influenza vaccination antenatally   Infant Nutrition: Breastfeeding   Contraception: does not want to start, but aware of 12 months spacing  Pain: Continue PO meds (tylenol 975mg q6, ibuprofen 800mg q6, also oxycodone available but pt not using at this time)  Heme: Hgb 10.9 >  > Hgb 10.0, VSS  FEN/GI: Tolerating regular diet, continue bowel regimen with Senna-docusate  :   Voiding spontaneously  PPX: Ambulation for DVT ppx     Dispo: Advancing well postoperatively, anticipate discharge today    Edi NATHAN-PGY1

## 2023-03-26 NOTE — PLAN OF CARE
Goal Outcome Evaluation:      Plan of Care Reviewed With: patient    Overall Patient Progress: improvingOverall Patient Progress: improving     Data: Vital signs stable, postpartum assessments within normal limits.   Eating and drinking without nausea or vomiting.  Up ad brittny, and voiding without difficulty. Passing gas.  Feeding babies independently-  breastfeeding  Pain managed with tylenol and ibuprofen. Pt states she is comfortable.  Incision appears to be healing well, no s/s infection.  Discharge outcomes on care plan met.   Action: Review of care plan, teaching, and discharge instructions done.  Response: Patient states understanding and comfort with her discharge instructions and plan of care. All questions addressed. She will discharge home.

## 2023-03-31 ENCOUNTER — TELEPHONE (OUTPATIENT)
Dept: OBGYN | Facility: CLINIC | Age: 33
End: 2023-03-31
Payer: COMMERCIAL

## 2023-03-31 NOTE — TELEPHONE ENCOUNTER
Called patient regarding completed forms. Left message with call back number.    Patient returned call within 5 minutes. Confirmed preferred e-mail. Forms sent.     Asked patient about making 2 week postpartum visit. Patient states she feels comfortable with timing of appointments with Dr. Valentin. Encouraged patient to call clinic back for questions or concerns.

## 2023-04-11 NOTE — PROGRESS NOTES
"Acute postpartum visit  4/12/2023    Reason for visit: Incision check    S: Patient is a 33 yo  POD#19 s/p PLTCS for di/di twins with breech presentation of Twin B presents for incision check.  Since procedure she has been doing really well.  Stopped taking meds around 1.5 weeks after surgery.  Bleeding now minimal, only bleeding after activity or nursing really now.  Tolerating regular diet.  Urinating without issues.  Still taking stool softeners as helps bowels not be as hard.  Breastfeeding going well.  Twins are doing great.  Rett is adjusting to being big brother, doesn't like her not being able to pick her up.  Mood wise doing well, little hormonal but nothing concerning.  Really feels like things could not have gone better and is really thankful pregnancy and delivery went so well.    O:  BP 98/63 (BP Location: Left arm, Patient Position: Chair)   Pulse 71   Ht 1.727 m (5' 8\")   Wt 72.2 kg (159 lb 1.6 oz)   BMI 24.19 kg/m       General: NAD, A&OX3  Resp: Nonlabored breathing  Abdomen: Soft, NT, ND  Incision: Pfannenstiel, C/D/I    A/P: 33 yo  presents for incision check  1) Wound check: Well healed, no concerns for infection  2) Mood check: Appropriate, no issues at this time, aware she can reach out with any concerns  3) RTC in 4 weeks for full postpartum visit    Dorothea Valentin MD  "

## 2023-04-12 ENCOUNTER — OFFICE VISIT (OUTPATIENT)
Dept: OBGYN | Facility: CLINIC | Age: 33
End: 2023-04-12
Attending: OBSTETRICS & GYNECOLOGY
Payer: COMMERCIAL

## 2023-04-12 VITALS
BODY MASS INDEX: 24.11 KG/M2 | SYSTOLIC BLOOD PRESSURE: 98 MMHG | DIASTOLIC BLOOD PRESSURE: 63 MMHG | HEART RATE: 71 BPM | HEIGHT: 68 IN | WEIGHT: 159.1 LBS

## 2023-04-12 DIAGNOSIS — Z51.89 VISIT FOR WOUND CHECK: ICD-10-CM

## 2023-04-12 PROCEDURE — 99207 PR POST PARTUM EXAM: CPT | Performed by: OBSTETRICS & GYNECOLOGY

## 2023-04-12 PROCEDURE — G0463 HOSPITAL OUTPT CLINIC VISIT: HCPCS | Performed by: OBSTETRICS & GYNECOLOGY

## 2023-04-12 NOTE — PATIENT INSTRUCTIONS
Thank you for trusting us with your care!     If you need to contact us for questions about:  Symptoms, Scheduling & Medical Questions; Non-urgent (2-3 day response) No message, Urgent (needing response today) [phone] (if after 3:30pm next day response)   Prescriptions: Please call your Pharmacy   Billing: Erin [phone] or [facility]:[phone]

## 2023-04-12 NOTE — LETTER
"4/12/2023     RE: Nova Matthews  [address]  [place] [address]     Dear Colleague,    Thank you for referring your patient, Nova Matthews, to the [facility] [place] at . Please see a copy of my visit note below.    Acute postpartum visit  4/12/2023    Reason for visit: Incision check    S: Patient is a 33 yo  POD#19 s/p PLTCS for di/di twins with breech presentation of Twin B presents for incision check.  Since procedure she has been doing really well.  Stopped taking meds around 1.5 weeks after surgery.  Bleeding now minimal, only bleeding after activity or nursing really now.  Tolerating regular diet.  Urinating without issues.  Still taking stool softeners as helps bowels not be as hard.  Breastfeeding going well.  Twins are doing great.  Rett is adjusting to being big brother, doesn't like her not being able to pick her up.  Mood wise doing well, little hormonal but nothing concerning.  Really feels like things could not have gone better and is really thankful pregnancy and delivery went so well.    O:  BP 98/63 (BP Location: Left arm, Patient Position: Chair)   Pulse 71   Ht 1.727 m (5' 8\")   Wt 72.2 kg (159 lb 1.6 oz)   BMI 24.19 kg/m       General: NAD, A&OX3  Resp: Nonlabored breathing  Abdomen: Soft, NT, ND  Incision: Pfannenstiel, C/D/I    A/P: 33 yo  presents for incision check  1) Wound check: Well healed, no concerns for infection  2) Mood check: Appropriate, no issues at this time, aware she can reach out with any concerns  3) RTC in 4 weeks for full postpartum visit    Dorothea Valentin MD  "

## 2023-05-06 ENCOUNTER — HEALTH MAINTENANCE LETTER (OUTPATIENT)
Age: 33
End: 2023-05-06

## 2023-12-13 ENCOUNTER — VIRTUAL VISIT (OUTPATIENT)
Dept: PEDIATRICS | Facility: CLINIC | Age: 33
End: 2023-12-13
Payer: COMMERCIAL

## 2023-12-13 ENCOUNTER — APPOINTMENT (OUTPATIENT)
Dept: LAB | Facility: CLINIC | Age: 33
End: 2023-12-13
Payer: COMMERCIAL

## 2023-12-13 DIAGNOSIS — J02.9 SORE THROAT: Primary | ICD-10-CM

## 2023-12-13 DIAGNOSIS — J01.90 ACUTE SINUSITIS WITH SYMPTOMS > 10 DAYS: ICD-10-CM

## 2023-12-13 LAB — GROUP A STREP BY PCR: NOT DETECTED

## 2023-12-13 PROCEDURE — 87651 STREP A DNA AMP PROBE: CPT | Performed by: NURSE PRACTITIONER

## 2023-12-13 PROCEDURE — 99213 OFFICE O/P EST LOW 20 MIN: CPT | Mod: 95 | Performed by: NURSE PRACTITIONER

## 2023-12-13 ASSESSMENT — ENCOUNTER SYMPTOMS: SORE THROAT: 1

## 2023-12-13 NOTE — PROGRESS NOTES
"Nova is a 33 year old who is being evaluated via a billable telephone visit.      What phone number would you like to be contacted at? [phone]  How would you like to obtain your AVS? Phyllis    Distant Location (provider location):  On-site    Assessment & Plan   Acute sinusitis with symptoms > 10 days  Sore throat  Strep neg. Home COVID test neg. Due to duration of symptoms to treat with abx. No concern for PTA based on history (also had her ENT colleague assess her throat without concerns for PTA). Follow-up if not better in 3-4 days, sooner if worsening.  - Group A Streptococcus PCR Throat Swab  - amoxicillin-clavulanate (AUGMENTIN) 875-125 MG tablet; Take 1 tablet by mouth 2 times daily for 10 days             Taryn Alfredo NP  [facility]    Beni Bhatt is a 33 year old, presenting for the following health issues:  Pharyngitis        12/13/2023     2:52 PM   Additional Questions   Roomed by Mignon   Accompanied by Self         12/13/2023     2:52 PM   Patient Reported Additional Medications   Patient reports taking the following new medications no     History of Present Illness       Reason for visit:  Sore throat, head ache, nasal congestion  Symptom onset:  1-2 weeks ago  Symptoms include:  Sore throat, congestion, headache  Symptom intensity:  Moderate  Symptom progression:  Worsening  Had these symptoms before:  No  What makes it better:  Advil    She eats 2-3 servings of fruits and vegetables daily.She consumes 0 sweetened beverage(s) daily.She exercises with enough effort to increase her heart rate 30 to 60 minutes per day.  She exercises with enough effort to increase her heart rate 4 days per week.   She is taking medications regularly.     Onset of symptoms 1.5 weeks ago  Kids at home: daughter with RSV  Pain to swallow but able  Chills no fever  Feels \"terrible\" without ibuprofen  ENT colleague looked at her throat-no abscess  Pt is a NP in ENT    Review of Systems "   HENT:  Positive for sore throat.             Objective       Vitals:  No vitals were obtained today due to virtual visit.    Physical Exam   healthy, alert, and no distress  PSYCH: Alert and oriented times 3; coherent speech, normal   rate and volume, able to articulate logical thoughts, able   to abstract reason, no tangential thoughts, no hallucinations   or delusions  Her affect is normal  RESP: No cough, no audible wheezing, able to talk in full sentences  Remainder of exam unable to be completed due to telephone visits                Phone call duration: 6 minutes

## 2023-12-13 NOTE — PROGRESS NOTES
Answers submitted by the patient for this visit:  General Questionnaire (Submitted on 12/13/2023)  Chief Complaint: Chronic problems general questions HPI Form  How many servings of fruits and vegetables do you eat daily?: 2-3  On average, how many sweetened beverages do you drink each day (Examples: soda, juice, sweet tea, etc.  Do NOT count diet or artificially sweetened beverages)?: 0  How many minutes a day do you exercise enough to make your heart beat faster?: 30 to 60  How many days a week do you exercise enough to make your heart beat faster?: 4  How many days per week do you miss taking your medication?: 0  General Concern (Submitted on 12/13/2023)  Chief Complaint: Chronic problems general questions HPI Form  What is the reason for your visit today?: Sore throat, head ache, nasal congestion  When did your symptoms begin?: 1-2 weeks ago  What are your symptoms?: Sore throat, congestion, headache  How would you describe these symptoms?: Moderate  Are your symptoms:: Worsening  Have you had these symptoms before?: No  Is there anything that makes you feel better?: Advil

## 2024-07-13 ENCOUNTER — HEALTH MAINTENANCE LETTER (OUTPATIENT)
Age: 34
End: 2024-07-13

## 2025-02-22 NOTE — PROGRESS NOTES
Continued Stay SW/CM Assessment/Plan of Care Note       Active Substitute Decision Maker (SDM)    There are no active Substitute Decision Maker (SDM) on file.           Progress note:  Weekend CM inquired if patient was medically ready for discharge today as discharge has been prearranged for day.    Addendum 0940: Patient is not medically ready for discharge. Goal is discharge tomorrow if [facility] and [facility] can accommodate.     CM called [facility] and spoke with Nancy. CM updated that patient is not medically ready and inquired if they can accommodate a discharge tomorrow, Sunday 2/23.  Nancy states they would be able to accommodate discharge.     CM called [facility] to cancel transportation for today and reschedule for tomorrow. They are able to accommodate at 1430 transport 2/23. CM alerted team.     CM called [facility] and spoke with Nancy to ensure a 1430 discharge time is appropriate on their end. She shares that \"it should be okay.\"     CM spoke with patient and alerted of discharge disposition for tomorrow. She is in agreement and thankful for information.    See SW/CM flowsheets for other objective data.    Disposition Recommendations:  Preliminary discharge destination: Planned Discharge Destination: Rehabilitation/Skilled Care  SW/CM recommendation for discharge: SNF    [place] [facility] [place] [facility] [address] [facility] with facility    Discharge Plan/Needs:     Continued Care and Services - Admitted Since 2/17/2025       Destination  Coordination complete.      Service Provider Request Status Selected Services Address Phone Fax    [facility]  Selected Skilled Nursing [address]  [facility] [id number] [phone]                  Continued Care and Services - Linked Episodes Includes continued care and service providers from the active episodes linked to this encounter, which are listed below      Care Transitions  [facility]   Post-partum Progress Note    Name:  Nova Matthews  MRN: [id number]    S:   Patient reports feeling well.  Pain well controlled.  Tolerating regular diet without nausea or vomiting.  Ambulating without dizziness since eating.  Voiding spontaneously. Has passed flatus; has not had bowel movement. Lochia similar to peak menstrual flow. Denies fever/chills, headache, vision changes, chest pain, shortness of breath, RUQ pain, increased edema. Breastfeeding.  Undecided on contraception at this time, IVF pregnancy.  Feels ready for discharge today.    O:   Patient Vitals for the past 24 hrs:   BP Temp Temp src Pulse Resp SpO2   21 0846 112/75 98.1  F (36.7  C) Oral 73 16 --   21 0230 107/69 97.9  F (36.6  C) Oral 64 18 98 %   21 2230 107/50 98.3  F (36.8  C) Oral 67 18 98 %   21 1842 (!) 144/75 -- -- 65 -- 98 %   21 1800 138/78 -- -- -- -- 99 %   21 1745 105/73 -- -- -- -- 100 %   21 1730 116/71 -- -- -- -- 98 %   21 1715 123/74 -- -- -- -- 100 %   21 1700 112/74 99.2  F (37.3  C) Oral -- -- 100 %   21 1645 105/63 -- -- -- -- 90 %   21 1630 101/61 -- -- -- -- 92 %   21 1458 119/59 -- -- -- -- 91 %   21 1445 -- 98.4  F (36.9  C) Oral -- -- 98 %   21 1415 -- -- -- -- -- 92 %   21 1345 133/77 -- -- -- -- --   21 1330 -- 97.8  F (36.6  C) Oral -- -- 100 %     Gen:  Resting comfortably, NAD  CV:  Well perfused  Pulm:  Non-labored breathing. No cough or audible wheezing.   Abd:  Soft, appropriately tender to palpation, non-distended.  Fundus below umbilicus, firm, and non-tender.  Ext:  Non-tender     Hgb:   Recent Labs   Lab 21  0643 21  1651 21  2255 21  1737 21  1505   HGB 7.7* 10.4* 10.6* 10.6* 11.3*     A/P:  30 year old  PPD#1 s/p . Pregnancy notable for abruption. Postpartum period notable for postpartum hemorrhage and developing GHTN. Meeting postpartum  Episode start date: 2/18/2025   There are no active outsourced providers for this episode.                 Prior To Hospitalization:    Living Situation: Other facility residents and residing at Long term care facility    .  Support Systems: Family members   Home Devices/Equipment: Mobility assist device            Mobility Assist Devices: Wheelchair   Type of Service Prior to Hospitalization: Community agency/program, , Other (comment) (SNF staff, Community Care care team)               Patient/Family discharge goal (s):  SNF     Resources provided:           Prior Function  Bed Mobility: Total Assist (Total) (02/18/25 0700 : Reema Rodrigez OT)  Transfers: Total Assist (Total) (02/18/25 0700 : Reema Rodrigez OT)  Ambulation in the Home: Total Assist (Total) (02/18/25 0700 : Reema Rodrigez OT)  Ambulation in the Community: Total Assist (Total) (02/18/25 0700 : Reema Rodrigez OT)    Current Function  Last Filed Values       None            Therapy Recommendations for Discharge:   PT:      Last Filed Values       None          OT:       Last Filed Values       None          SLP:    Last Filed Values       None            Mobility Equipment Recommended for Discharge:        Barriers to Discharge  Identified Barriers to Discharge/Transition Planning: Medical necessity for acute care        Radha Brown RN   Case Management   PH: [phone]  Cell: [phone]  Fax: [phone]  For weekends, please call our main line at [phone]            goals. Continue with routine postpartum management.     #PPH  #ABLA  - Hgb 10.6> 10.4> EBL 1302 (meth x2, NE miso 800mcg, sweeps, Ancef) >7.7.  Acute blood loss anemia secondary to procedural blood loss, asymptomatic.  Plan Discharge with oral iron.  - Asymptomatic  - Coags wnl x3    #GHTN  - BP largely normotensive  - HELLP labs wnl x1; UPC 0.17  - Discharge with BP cuff and 1 week BP check    #Routine Postpartum  Pain: Well-controlled with scheduled tylenol, ibuprofen,   GI:  PRN Senna/Colace.  PRN Simethicone.   PPx:  Encouraged ambulation   Rh: Positive  Rub:  Immune  Hep B: Surface antigen negative  Baby: In room, doing well  Feed: Breastfeeding  BC: IVF pregnancy  Dispo: Plan for home on PPD#1.    Patient evaluated with Dr. Valentin.    Colin Miller, MS3    Pt seen and discussed with Dr. Valentin.     Physician Attestation     I, Dorothea Valentin, was present with the medical student who participated in the service and in the documentation of the note. I have verified the history and personally performed the physical exam and medical decision making. I agree with the assessment and plan of care as documented in the note.       Dorothea Valentin MD

## 2025-07-19 ENCOUNTER — HEALTH MAINTENANCE LETTER (OUTPATIENT)
Age: 35
End: 2025-07-19

## (undated) DEVICE — SOL NACL 0.9% IRRIG 1000ML BOTTLE 07138-09

## (undated) DEVICE — CATH TRAY FOLEY SURESTEP 16FR WDRAIN BAG STLK LATEX A300316A

## (undated) DEVICE — SOL WATER IRRIG 1000ML BOTTLE 07139-09

## (undated) DEVICE — DRSG ABDOMINAL 07 1/2X8" 7197D

## (undated) DEVICE — STOCKING SLEEVE COMPRESSION CALF LG

## (undated) DEVICE — PACK C-SECTION LF PL15OTA83B

## (undated) DEVICE — DRSG STERI STRIP 1/4X3" R1541

## (undated) DEVICE — STRAP KNEE/BODY 31143004

## (undated) DEVICE — PREP CHLORAPREP 26ML TINTED ORANGE  260815

## (undated) RX ORDER — KETOROLAC TROMETHAMINE 30 MG/ML
INJECTION, SOLUTION INTRAMUSCULAR; INTRAVENOUS
Status: DISPENSED
Start: 2023-03-24

## (undated) RX ORDER — MORPHINE SULFATE 1 MG/ML
INJECTION, SOLUTION EPIDURAL; INTRATHECAL; INTRAVENOUS
Status: DISPENSED
Start: 2023-03-24

## (undated) RX ORDER — FENTANYL CITRATE 50 UG/ML
INJECTION, SOLUTION INTRAMUSCULAR; INTRAVENOUS
Status: DISPENSED
Start: 2023-03-24